# Patient Record
Sex: FEMALE | Race: WHITE | Employment: OTHER | URBAN - METROPOLITAN AREA
[De-identification: names, ages, dates, MRNs, and addresses within clinical notes are randomized per-mention and may not be internally consistent; named-entity substitution may affect disease eponyms.]

---

## 2017-03-28 ENCOUNTER — HOSPITAL ENCOUNTER (OUTPATIENT)
Dept: LAB | Age: 77
Discharge: HOME | End: 2017-03-28
Payer: MEDICARE

## 2017-03-28 ENCOUNTER — OFFICE VISIT (OUTPATIENT)
Dept: FAMILY MEDICINE CLINIC | Age: 77
End: 2017-03-28

## 2017-03-28 VITALS
SYSTOLIC BLOOD PRESSURE: 123 MMHG | HEART RATE: 70 BPM | WEIGHT: 159 LBS | TEMPERATURE: 97 F | DIASTOLIC BLOOD PRESSURE: 52 MMHG | OXYGEN SATURATION: 98 % | HEIGHT: 62 IN | BODY MASS INDEX: 29.26 KG/M2 | RESPIRATION RATE: 16 BRPM

## 2017-03-28 DIAGNOSIS — M15.9 PRIMARY OSTEOARTHRITIS INVOLVING MULTIPLE JOINTS: ICD-10-CM

## 2017-03-28 DIAGNOSIS — E55.9 VITAMIN D DEFICIENCY: ICD-10-CM

## 2017-03-28 DIAGNOSIS — K21.9 GASTROESOPHAGEAL REFLUX DISEASE WITHOUT ESOPHAGITIS: ICD-10-CM

## 2017-03-28 DIAGNOSIS — Z51.81 ENCOUNTER FOR MEDICATION MONITORING: ICD-10-CM

## 2017-03-28 DIAGNOSIS — F41.9 ANXIETY: ICD-10-CM

## 2017-03-28 DIAGNOSIS — Z00.00 MEDICARE ANNUAL WELLNESS VISIT, SUBSEQUENT: Primary | ICD-10-CM

## 2017-03-28 DIAGNOSIS — R73.02 IGT (IMPAIRED GLUCOSE TOLERANCE): ICD-10-CM

## 2017-03-28 DIAGNOSIS — E78.1 HYPERTRIGLYCERIDEMIA: ICD-10-CM

## 2017-03-28 LAB
GLUCOSE POC: 91 MG/DL
HBA1C MFR BLD HPLC: 5.7 %

## 2017-03-28 PROCEDURE — 80053 COMPREHEN METABOLIC PANEL: CPT

## 2017-03-28 PROCEDURE — 82306 VITAMIN D 25 HYDROXY: CPT

## 2017-03-28 PROCEDURE — 80061 LIPID PANEL: CPT

## 2017-03-28 PROCEDURE — 85027 COMPLETE CBC AUTOMATED: CPT

## 2017-03-28 PROCEDURE — 36415 COLL VENOUS BLD VENIPUNCTURE: CPT

## 2017-03-28 RX ORDER — AMOXICILLIN 500 MG/1
CAPSULE ORAL
COMMUNITY
End: 2017-09-25

## 2017-03-28 RX ORDER — LORAZEPAM 1 MG/1
.5-1 TABLET ORAL
Qty: 90 TAB | Refills: 1 | Status: SHIPPED | OUTPATIENT
Start: 2017-03-28 | End: 2017-09-25 | Stop reason: SDUPTHER

## 2017-03-28 RX ORDER — AMOXICILLIN 500 MG/1
CAPSULE ORAL
COMMUNITY
Start: 2017-01-25 | End: 2017-03-28 | Stop reason: ALTCHOICE

## 2017-03-28 NOTE — MR AVS SNAPSHOT
Visit Information Date & Time Provider Department Dept. Phone Encounter #  
 3/28/2017  8:00 AM Meir Alfonso MD [facility] [phone] [id number] Follow-up Instructions Return in about 6 months (around 9/28/2017). Upcoming Health Maintenance Date Due  
 MEDICARE YEARLY EXAM 10/1/2014 GLAUCOMA SCREENING Q2Y 10/10/2018 COLONOSCOPY 10/17/2021 DTaP/Tdap/Td series (2 - Td) 3/21/2022 Allergies as of 3/28/2017  Review Complete On: 3/28/2017 By: Meir Alfonso MD  
  
 Severity Noted Reaction Type Reactions Macrobid [Nitrofurantoin Monohyd/m-cryst]  08/24/2012    Rash Pcn [Penicillins]  02/23/2010    Hives Current Immunizations  Reviewed on 9/28/2016 Name Date Hib 9/17/2013 Influenza High Dose Vaccine PF 9/2/2016, 8/26/2015 Influenza Vaccine 9/15/2014 Influenza Vaccine Split 8/29/2012, 9/21/2011 Pneumococcal Conjugate (PCV-13) 8/19/2015 Pneumococcal Vaccine (Pcv) 11/21/2007 TDAP Vaccine 3/21/2012 Varicella Virus Vaccine Live 12/6/2012 Not reviewed this visit You Were Diagnosed With   
  
 Codes Comments Hypertriglyceridemia    -  Primary ICD-10-CM: E78.1 ICD-9-CM: 272.1 IGT (impaired glucose tolerance)     ICD-10-CM: R73.02 
ICD-9-CM: 790.22 Primary osteoarthritis involving multiple joints     ICD-10-CM: M15.0 ICD-9-CM: 715.09 Vitamin D deficiency     ICD-10-CM: E55.9 ICD-9-CM: 268.9 Gastroesophageal reflux disease without esophagitis     ICD-10-CM: K21.9 ICD-9-CM: 530.81 Encounter for medication monitoring     ICD-10-CM: Z51.81 
ICD-9-CM: V58.83 Anxiety     ICD-10-CM: F41.9 ICD-9-CM: 300.00 Vitals BP Pulse Temp Resp Height(growth percentile) Weight(growth percentile) 123/52 (BP 1 Location: Left arm, BP Patient Position: Sitting) 70 97 °F (36.1 °C) (Oral) 16 5' 1.75\" (1.568 m) 159 lb (72.1 kg) SpO2 BMI OB Status Smoking Status 98% 29.32 kg/m2 Postmenopausal Current Every Day Smoker Vitals History BMI and BSA Data Body Mass Index Body Surface Area  
 29.32 kg/m 2 1.77 m 2 Preferred Pharmacy Pharmacy Name Phone [facility], [address] [facility] [phone] Your Updated Medication List  
  
   
This list is accurate as of: 3/28/17  9:04 AM.  Always use your most recent med list.  
  
  
  
  
 amoxicillin 500 mg capsule Commonly known as:  AMOXIL Take 4 caps 1 hour prior to dental procedures  
  
 aspirin 81 mg chewable tablet Take 81 mg by mouth daily. cetirizine 10 mg tablet Commonly known as:  ZYRTEC Take 10 mg by mouth as needed for Allergies. esomeprazole 40 mg capsule Commonly known as:  NexIUM Take 1 Cap by mouth daily. gemfibrozil 600 mg tablet Commonly known as:  LOPID Take 1 Tab by mouth two (2) times a day. ibuprofen 200 mg tablet Commonly known as:  MOTRIN Take 200 mg by mouth every six (6) hours as needed for Pain. LORazepam 1 mg tablet Commonly known as:  ATIVAN Take 0.5-1 Tabs by mouth every eight (8) hours as needed for Anxiety. promethazine-codeine 6.25-10 mg/5 mL syrup Commonly known as:  PHENERGAN with CODEINE Take 5 mL by mouth every six (6) hours as needed for Cough. Max Daily Amount: 20 mL. VITAMIN D3 1,000 unit Cap Generic drug:  cholecalciferol Take 1 Cap by mouth daily. Prescriptions Printed Refills LORazepam (ATIVAN) 1 mg tablet 1 Sig: Take 0.5-1 Tabs by mouth every eight (8) hours as needed for Anxiety. Class: Print Route: Oral  
  
We Performed the Following AMB POC GLUCOSE, QUANTITATIVE, BLOOD [26723 CPT(R)] AMB POC HEMOGLOBIN A1C [57079 CPT(R)] CBC W/O DIFF [98130 CPT(R)] LIPID PANEL [92616 CPT(R)] METABOLIC PANEL, COMPREHENSIVE [39415 CPT(R)] Follow-up Instructions Return in about 6 months (around 9/28/2017). To-Do List   
 05/01/2017 3:30 PM  
  Appointment with [address] at [facility] ([phone]) NON-CONTRAST STUDY: 1. Bring any non [facility] facility films/images pertaining to the area of interest with you on the day of appointment. 2. Check in at registration at least 30 minutes before appt time unless you were instructed to do otherwise. 3. If you have to drink oral contrast please pick it up any weekday prior to your appointment, if you cannot please check in 2 hrs  before appt time. Introducing [facility]! Edita Deleon introduces [employer] patient portal. Now you can access parts of your medical record, email your doctor's office, and request medication refills online. 1. In your internet browser, go to [email or web address] 2. Click on the First Time User? Click Here link in the Sign In box. You will see the New Member Sign Up page. 3. Enter your [employer] Access Code exactly as it appears below. You will not need to use this code after youve completed the sign-up process. If you do not sign up before the expiration date, you must request a new code. · [employer] Access Code: [email or web address] Expires: 4/5/2017  3:04 PM 
 
4. Enter the last four digits of your Social Security Number (xxxx) and Date of Birth (mm/dd/yyyy) as indicated and click Submit. You will be taken to the next sign-up page. 5. Create a Command Information[employer] ID. This will be your [employer] login ID and cannot be changed, so think of one that is secure and easy to remember. 6. Create a [employer] password. You can change your password at any time. 7. Enter your Password Reset Question and Answer. This can be used at a later time if you forget your password. 8. Enter your e-mail address. You will receive e-mail notification when new information is available in [address]. 9. Click Sign Up. You can now view and download portions of your medical record. 10. Click the Download Summary menu link to download a portable copy of your medical information. If you have questions, please visit the Frequently Asked Questions section of the [email or web address] website. Remember, [email or web address] is NOT to be used for urgent needs. For medical emergencies, dial 911. Now available from your iPhone and Android! Please provide this summary of care documentation to your next provider. Your primary care clinician is listed as Jailyn Fagan. If you have any questions after today's visit, please call [phone].

## 2017-03-28 NOTE — PROGRESS NOTES
Chief Complaint   Patient presents with    Annual Wellness Visit     Medicare         Lipid: 9/28/2016    A1C 9/28/2016    CMP 9/28/2016    Pt states last mammogram was 9/2016 at [facility]. Pt states she had a colonoscopy 10/2016 by Dr. Lupis Sagastume and needs to repeat in 5 years.

## 2017-03-28 NOTE — PROGRESS NOTES
HISTORY OF PRESENT ILLNESS  Sabino Garcia is a 77 y.o. female. HPI   Follow up on chronic medical problems. Doing well. Hypertriglyceridemia follow up:  Compliant w/ meds, low fat, low cholesterol diet. Exercising some. No muscle nor abdominal pain, no skin discoloration. Labs done per endo. Osteoarthritis:  Patient has osteoarthritis. Has various joint aches but overall doing well. Symptoms onset: problem is longstanding. Rheumatological ROS: stable, mild-to-moderate joint symptoms intermittently, reasonably well controlled by PRN meds. Response to treatment plan: stable and intermittent. Glucose intolerance reveiw:  She has IGT. Labs done requested by endo to be done. Diabetic ROS - further diabetic ROS: no polyuria or polydipsia, no chest pain, dyspnea or TIA's, no numbness, tingling or pain in extremities, no unusual visual symptoms. Lab review: orders written for new lab studies as appropriate; see orders. {Choose one or more HPI Chronic Disease Notes, press DELETE if none desired:71070}  {Choose one or more SmartLinks; press DELETE if none desired:[id number]}   {Choose one or more Last Lab values; press DELETE if none desired:[id number]}     Review of Systems   Constitutional: Negative for malaise/fatigue. HENT: Negative for congestion. Eyes: Negative for blurred vision. Respiratory: Negative for cough and shortness of breath. Cardiovascular: Negative for chest pain, palpitations and leg swelling. Gastrointestinal: Negative for abdominal pain, constipation and heartburn. Genitourinary: Negative for dysuria, frequency and urgency. Musculoskeletal: Negative for back pain and joint pain. Neurological: Negative for dizziness, tingling and headaches. Endo/Heme/Allergies: Negative for environmental allergies. Psychiatric/Behavioral: Negative for depression. The patient does not have insomnia.         Physical Exam   Constitutional: She appears well-developed and well-nourished. HENT:   Right Ear: Tympanic membrane and ear canal normal.   Left Ear: Tympanic membrane and ear canal normal.   Nose: No mucosal edema or rhinorrhea. Mouth/Throat: Oropharynx is clear and moist and mucous membranes are normal.   Neck: Normal range of motion. Neck supple. No thyromegaly present. Cardiovascular: Normal rate and regular rhythm. No murmur heard. Pulmonary/Chest: Effort normal and breath sounds normal.   Abdominal: Soft. Bowel sounds are normal. There is no tenderness. Musculoskeletal: Normal range of motion. She exhibits no edema. Lymphadenopathy:     She has no cervical adenopathy. Skin: Skin is warm and dry. Psychiatric: She has a normal mood and affect. Nursing note and vitals reviewed.       ASSESSMENT and PLAN  {ASSESSMENT/PLAN:54408}

## 2017-03-28 NOTE — PROGRESS NOTES
This is a Subsequent Medicare Annual Wellness Visit providing Personalized Prevention Plan Services (PPPS) (Performed 12 months after initial AWV and PPPS )    I have reviewed the patient's medical history in detail and updated the computerized patient record. Doing well. Hypertriglyceridemia follow up:  Compliant w/ meds, low fat, low cholesterol diet. Exercising some. No muscle nor abdominal pain, no skin discoloration. Labs done per endo. Osteoarthritis:  Patient has osteoarthritis. Has various joint aches but overall doing well. Symptoms onset: problem is longstanding. Rheumatological ROS: stable, mild-to-moderate joint symptoms intermittently, reasonably well controlled by PRN meds. Response to treatment plan: stable and intermittent. Glucose intolerance reveiw:  She has IGT. Labs done requested by endo to be done. Diabetic ROS - further diabetic ROS: no polyuria or polydipsia, no chest pain, dyspnea or TIA's, no numbness, tingling or pain in extremities, no unusual visual symptoms. Lab review: orders written for new lab studies as appropriate; see orders.    History     Past Medical History:   Diagnosis Date    GERD (gastroesophageal reflux disease) 2/23/2010    Hypertriglyceridemia 2/23/2010    Hypovitaminosis D     OA (osteoarthritis) 2/23/2010    Osteoarthritis     Vitamin D deficiency 2/23/2010      Past Surgical History:   Procedure Laterality Date    HX HEENT  12/08    cervical neck stenosis s/p cervical release    HX HIP REPLACEMENT  6/2007    right    HX HIP REPLACEMENT  12/2007    left    HX ORTHOPAEDIC  12/2008    cervical release    NH COLONOSCOPY FLX DX W/COLLJ SPEC WHEN PFRMD  06/2006    NH COLONOSCOPY FLX DX W/COLLJ SPEC WHEN PFRMD  08/29/2011    Dr. Abigail Ignacio     Current Outpatient Prescriptions   Medication Sig Dispense Refill    amoxicillin (AMOXIL) 500 mg capsule Take 4 caps 1 hour prior to dental procedures      LORazepam (ATIVAN) 1 mg tablet Take 0.5-1 Tabs by mouth every eight (8) hours as needed for Anxiety. 90 Tab 1    gemfibrozil (LOPID) 600 mg tablet Take 1 Tab by mouth two (2) times a day. 180 Tab 3    esomeprazole (NEXIUM) 40 mg capsule Take 1 Cap by mouth daily. 90 Cap 3    ibuprofen (MOTRIN) 200 mg tablet Take 200 mg by mouth every six (6) hours as needed for Pain.  aspirin 81 mg chewable tablet Take 81 mg by mouth daily.  cetirizine (ZYRTEC) 10 mg tablet Take 10 mg by mouth as needed for Allergies.  Cholecalciferol, Vitamin D3, (VITAMIN D3) 1,000 unit Cap Take 1 Cap by mouth daily.  promethazine-codeine (PHENERGAN WITH CODEINE) 6.25-10 mg/5 mL syrup Take 5 mL by mouth every six (6) hours as needed for Cough. Max Daily Amount: 20 mL. 240 mL 1     Allergies   Allergen Reactions    Macrobid [Nitrofurantoin Monohyd/M-Cryst] Rash    Pcn [Penicillins] Hives     Family History   Problem Relation Age of Onset    Hypertension Mother     Heart Failure Mother     Cancer Father      colon    Lung Disease Brother     Arthritis-rheumatoid Brother     Heart Disease Brother      Social History   Substance Use Topics    Smoking status: Current Every Day Smoker     Packs/day: 0.25     Years: 55.00    Smokeless tobacco: Never Used    Alcohol use 0.0 oz/week     0 Standard drinks or equivalent per week      Comment: 1 drink a month     Patient Active Problem List   Diagnosis Code    Hypertriglyceridemia E78.1    GERD (gastroesophageal reflux disease) K21.9    OA (osteoarthritis) M19.90    Vitamin D deficiency E55.9    Anxiety F41.9    Environmental allergies Z91.09    Encounter for medication monitoring Z51.81    IGT (impaired glucose tolerance) R73.02       Depression Risk Factor Screening:     PHQ 2 / 9, over the last two weeks 3/28/2017   Little interest or pleasure in doing things Not at all   Feeling down, depressed or hopeless Not at all   Total Score PHQ 2 0     Alcohol Risk Factor Screening:    On any occasion during the past 3 months, have you had more than 3 drinks containing alcohol? No    Do you average more than 7 drinks per week? No      Functional Ability and Level of Safety:     Hearing Loss   Normal     Activities of Daily Living   Self-care. Requires assistance with: no ADLs    Fall Risk     Fall Risk Assessment, last 12 mths 3/28/2017   Able to walk? Yes   Fall in past 12 months? No     Abuse Screen   Patient is not abused    Review of Systems   Constitutional: negative for fatigue and malaise  Eyes: negative for irritation and redness  Ears, nose, mouth, throat, and face: negative for earaches, nasal congestion and hoarseness  Respiratory: negative for cough, sputum or dyspnea on exertion  Cardiovascular: negative for chest pain, chest pressure/discomfort, dyspnea, palpitations, irregular heart beats, fatigue, lower extremity edema  Gastrointestinal: negative for dysphagia, dyspepsia, reflux symptoms, nausea, vomiting, change in bowel habits, melena, constipation and abdominal pain  Genitourinary:negative for frequency, dysuria, nocturia, urinary incontinence. Integument/breast: negative for rash and dryness  Hematologic/lymphatic: negative for easy bruising and lymphadenopathy  Musculoskeletal:negative for myalgias, arthralgias, stiff joints, neck pain, back pain and muscle weakness  Neurological: negative for headaches, dizziness, tremor and weakness  Endocrine: negative for diabetic symptoms including polyuria and polydipsia      Physical Examination     Evaluation of Cognitive Function:  Mood/affect:  happy  Appearance: age appropriate  Family member/caregiver input: NA    Physical Exam   Constitutional: She appears well-developed and well-nourished.      Visit Vitals    /52 (BP 1 Location: Left arm, BP Patient Position: Sitting)    Pulse 70    Temp 97 °F (36.1 °C) (Oral)    Resp 16    Ht 5' 1.75\" (1.568 m)    Wt 159 lb (72.1 kg)    SpO2 98%    BMI 29.32 kg/m2          HENT:   Right Ear: Tympanic membrane and ear canal normal.   Left Ear: Tympanic membrane and ear canal normal.   Nose: No mucosal edema or rhinorrhea. Mouth/Throat: Oropharynx is clear and moist and mucous membranes are normal.   Neck: Normal range of motion. Neck supple. No thyromegaly present. Cardiovascular: Normal rate and regular rhythm. No murmur heard. Pulmonary/Chest: Effort normal and breath sounds normal.   Abdominal: Soft. Bowel sounds are normal. There is no tenderness. Musculoskeletal: Normal range of motion. She exhibits no edema. Lymphadenopathy:     She has no cervical adenopathy. Skin: Skin is warm and dry. Psychiatric: She has a normal mood and affect. Nursing note and vitals reviewed. Patient Care Team:  Eboni Gruber MD as PCP - General    Advice/Referrals/Counseling   Education and counseling provided:  Are appropriate based on today's review and evaluation  End-of-Life planning (with patient's consent)      Assessment/Plan   Adriano berg was seen today for annual wellness visit. Diagnoses and all orders for this visit:    Medicare annual wellness visit, subsequent    Hypertriglyceridemia  -     LIPID PANEL    IGT (impaired glucose tolerance)  -     AMB POC HEMOGLOBIN A1C  -     AMB POC GLUCOSE, QUANTITATIVE, BLOOD    Primary osteoarthritis involving multiple joints  Stable     Vitamin D deficiency  -     VITAMIN D, 25 HYDROXY    Gastroesophageal reflux disease without esophagitis  Stable on Nexium prn. Anxiety  -    Refill LORazepam (ATIVAN) 1 mg tablet; Take 0.5-1 Tabs by mouth every eight (8) hours as needed for Anxiety. Encounter for medication monitoring  -     METABOLIC PANEL, COMPREHENSIVE  -     CBC W/O DIFF      Follow-up Disposition:  Return in about 6 months (around 9/28/2017). reviewed diet, exercise and weight control  cardiovascular risk and specific lipid/LDL goals reviewed  reviewed medications and side effects in detail.       I have discussed diagnosis listed in this note with pt and/or family. I have discussed treatment plans and options and the risk/benefit analysis of those options, including safe use of medications and possible medication side effects. Through the use of shared decision making we have agreed to the above plan. The patient has received an after-visit summary and questions were answered concerning future plans and follow up. Advise pt of any urgent changes then to proceed to the ER.

## 2017-03-29 LAB
25(OH)D3+25(OH)D2 SERPL-MCNC: 38.5 NG/ML (ref 30–100)
ALBUMIN SERPL-MCNC: 4.5 G/DL (ref 3.5–4.8)
ALBUMIN/GLOB SERPL: 2 {RATIO} (ref 1.2–2.2)
ALP SERPL-CCNC: 124 IU/L (ref 39–117)
ALT SERPL-CCNC: 12 IU/L (ref 0–32)
AST SERPL-CCNC: 17 IU/L (ref 0–40)
BILIRUB SERPL-MCNC: 0.3 MG/DL (ref 0–1.2)
BUN SERPL-MCNC: 16 MG/DL (ref 8–27)
BUN/CREAT SERPL: 17 (ref 11–26)
CALCIUM SERPL-MCNC: 10 MG/DL (ref 8.7–10.3)
CHLORIDE SERPL-SCNC: 104 MMOL/L (ref 96–106)
CHOLEST SERPL-MCNC: 220 MG/DL (ref 100–199)
CO2 SERPL-SCNC: 23 MMOL/L (ref 18–29)
CREAT SERPL-MCNC: 0.93 MG/DL (ref 0.57–1)
ERYTHROCYTE [DISTWIDTH] IN BLOOD BY AUTOMATED COUNT: 13.9 % (ref 12.3–15.4)
GLOBULIN SER CALC-MCNC: 2.3 G/DL (ref 1.5–4.5)
GLUCOSE SERPL-MCNC: 92 MG/DL (ref 65–99)
HCT VFR BLD AUTO: 39.3 % (ref 34–46.6)
HDLC SERPL-MCNC: 53 MG/DL
HGB BLD-MCNC: 12.8 G/DL (ref 11.1–15.9)
INTERPRETATION, 910389: NORMAL
LDLC SERPL CALC-MCNC: 140 MG/DL (ref 0–99)
MCH RBC QN AUTO: 29.3 PG (ref 26.6–33)
MCHC RBC AUTO-ENTMCNC: 32.6 G/DL (ref 31.5–35.7)
MCV RBC AUTO: 90 FL (ref 79–97)
PLATELET # BLD AUTO: 350 X10E3/UL (ref 150–379)
POTASSIUM SERPL-SCNC: 4.6 MMOL/L (ref 3.5–5.2)
PROT SERPL-MCNC: 6.8 G/DL (ref 6–8.5)
RBC # BLD AUTO: 4.37 X10E6/UL (ref 3.77–5.28)
SODIUM SERPL-SCNC: 144 MMOL/L (ref 134–144)
TRIGL SERPL-MCNC: 134 MG/DL (ref 0–149)
VLDLC SERPL CALC-MCNC: 27 MG/DL (ref 5–40)
WBC # BLD AUTO: 7.4 X10E3/UL (ref 3.4–10.8)

## 2017-05-01 ENCOUNTER — HOSPITAL ENCOUNTER (OUTPATIENT)
Dept: CT IMAGING | Age: 77
Discharge: HOME | End: 2017-05-01
Attending: FAMILY MEDICINE
Payer: SELF-PAY

## 2017-05-01 DIAGNOSIS — Z12.2 ENCOUNTER FOR SCREENING FOR LUNG CANCER: ICD-10-CM

## 2017-05-01 DIAGNOSIS — Z87.891 PERSONAL HISTORY OF NICOTINE DEPENDENCE: ICD-10-CM

## 2017-05-01 DIAGNOSIS — Z72.0 TOBACCO USE: ICD-10-CM

## 2017-05-01 PROCEDURE — G0297 LDCT FOR LUNG CA SCREEN: HCPCS

## 2017-09-12 DIAGNOSIS — R92.8 ABNORMAL MAMMOGRAM: Primary | ICD-10-CM

## 2017-09-12 DIAGNOSIS — N63.20 LEFT BREAST MASS: ICD-10-CM

## 2017-09-25 ENCOUNTER — HOSPITAL ENCOUNTER (OUTPATIENT)
Dept: LAB | Age: 77
Discharge: HOME | End: 2017-09-25
Payer: MEDICARE

## 2017-09-25 ENCOUNTER — OFFICE VISIT (OUTPATIENT)
Dept: FAMILY MEDICINE CLINIC | Age: 77
End: 2017-09-25

## 2017-09-25 VITALS
DIASTOLIC BLOOD PRESSURE: 70 MMHG | BODY MASS INDEX: 29.7 KG/M2 | RESPIRATION RATE: 18 BRPM | SYSTOLIC BLOOD PRESSURE: 120 MMHG | OXYGEN SATURATION: 98 % | HEIGHT: 62 IN | HEART RATE: 88 BPM | WEIGHT: 161.4 LBS | TEMPERATURE: 97.9 F

## 2017-09-25 DIAGNOSIS — M15.9 PRIMARY OSTEOARTHRITIS INVOLVING MULTIPLE JOINTS: ICD-10-CM

## 2017-09-25 DIAGNOSIS — E78.1 HYPERTRIGLYCERIDEMIA: Primary | ICD-10-CM

## 2017-09-25 DIAGNOSIS — F41.9 ANXIETY: ICD-10-CM

## 2017-09-25 DIAGNOSIS — K21.9 GASTROESOPHAGEAL REFLUX DISEASE WITHOUT ESOPHAGITIS: ICD-10-CM

## 2017-09-25 DIAGNOSIS — R73.02 IGT (IMPAIRED GLUCOSE TOLERANCE): ICD-10-CM

## 2017-09-25 DIAGNOSIS — Z91.09 ENVIRONMENTAL ALLERGIES: ICD-10-CM

## 2017-09-25 DIAGNOSIS — Z51.81 ENCOUNTER FOR MEDICATION MONITORING: ICD-10-CM

## 2017-09-25 LAB
GLUCOSE POC: 91 MG/DL
HBA1C MFR BLD HPLC: 6 %

## 2017-09-25 PROCEDURE — 80053 COMPREHEN METABOLIC PANEL: CPT

## 2017-09-25 PROCEDURE — 36415 COLL VENOUS BLD VENIPUNCTURE: CPT

## 2017-09-25 PROCEDURE — 80061 LIPID PANEL: CPT

## 2017-09-25 RX ORDER — LORAZEPAM 1 MG/1
.5-1 TABLET ORAL
Qty: 90 TAB | Refills: 1 | Status: SHIPPED | OUTPATIENT
Start: 2017-09-25 | End: 2018-04-02 | Stop reason: SDUPTHER

## 2017-09-25 RX ORDER — PROMETHAZINE HYDROCHLORIDE AND CODEINE PHOSPHATE 6.25; 1 MG/5ML; MG/5ML
1 SOLUTION ORAL
Qty: 240 ML | Refills: 1 | Status: SHIPPED | OUTPATIENT
Start: 2017-09-25 | End: 2019-10-31

## 2017-09-25 RX ORDER — GEMFIBROZIL 600 MG/1
600 TABLET, FILM COATED ORAL 2 TIMES DAILY
Qty: 180 TAB | Refills: 3 | Status: SHIPPED | OUTPATIENT
Start: 2017-09-25 | End: 2018-10-02 | Stop reason: SDUPTHER

## 2017-09-25 RX ORDER — PANTOPRAZOLE SODIUM 40 MG/1
40 TABLET, DELAYED RELEASE ORAL DAILY
Qty: 90 TAB | Refills: 3 | Status: SHIPPED | OUTPATIENT
Start: 2017-09-25 | End: 2018-10-02 | Stop reason: SDUPTHER

## 2017-09-25 NOTE — PROGRESS NOTES
HISTORY OF PRESENT ILLNESS  Steven Bonner is a 69 y.o. female. HPI   Follow up on chronic medical problems. Doing well. Hypertriglyceridemia follow up:  Compliant w/ meds, low fat, low cholesterol diet. Exercising some. No muscle nor abdominal pain, no skin discoloration. Labs done per endo. Osteoarthritis:  Patient has osteoarthritis. Has various joint aches but overall doing well. Symptoms onset: problem is longstanding. Rheumatological ROS: stable, mild-to-moderate joint symptoms intermittently, reasonably well controlled by PRN meds. Response to treatment plan: stable and intermittent. Glucose intolerance reveiw:  She has IGT. Diabetic ROS - further diabetic ROS: no polyuria or polydipsia, no chest pain, dyspnea or TIA's, no numbness, tingling or pain in extremities, no unusual visual symptoms. Lab review: orders written for new lab studies as appropriate; see orders. Patient Active Problem List   Diagnosis Code    Hypertriglyceridemia E78.1    GERD (gastroesophageal reflux disease) K21.9    OA (osteoarthritis) M19.90    Vitamin D deficiency E55.9    Anxiety F41.9    Environmental allergies Z91.09    Encounter for medication monitoring Z51.81    IGT (impaired glucose tolerance) R73.02       Current Outpatient Prescriptions   Medication Sig Dispense Refill    LORazepam (ATIVAN) 1 mg tablet Take 0.5-1 Tabs by mouth every eight (8) hours as needed for Anxiety. 90 Tab 1    gemfibrozil (LOPID) 600 mg tablet Take 1 Tab by mouth two (2) times a day. 180 Tab 3    esomeprazole (NEXIUM) 40 mg capsule Take 1 Cap by mouth daily. 90 Cap 3    ibuprofen (MOTRIN) 200 mg tablet Take 200 mg by mouth every six (6) hours as needed for Pain.  aspirin 81 mg chewable tablet Take 81 mg by mouth daily.  cetirizine (ZYRTEC) 10 mg tablet Take 10 mg by mouth as needed for Allergies.  Cholecalciferol, Vitamin D3, (VITAMIN D3) 1,000 unit Cap Take 1 Cap by mouth daily. Allergies   Allergen Reactions    Macrobid [Nitrofurantoin Monohyd/M-Cryst] Rash    Pcn [Penicillins] Hives         Past Medical History:   Diagnosis Date    GERD (gastroesophageal reflux disease) 2/23/2010    Hypertriglyceridemia 2/23/2010    Hypovitaminosis D     OA (osteoarthritis) 2/23/2010    Osteoarthritis     Vitamin D deficiency 2/23/2010         Past Surgical History:   Procedure Laterality Date    HX HEENT  12/08    cervical neck stenosis s/p cervical release    HX HIP REPLACEMENT  6/2007    right    HX HIP REPLACEMENT  12/2007    left    HX ORTHOPAEDIC  12/2008    cervical release    [place] COLONOSCOPY FLX DX W/COLLJ SPEC WHEN PFRMD  06/2006    [place] COLONOSCOPY FLX DX W/COLLJ SPEC WHEN PFRMD  08/29/2011    Dr. Mihir Bolanos         Family History   Problem Relation Age of Onset    Hypertension Mother     Heart Failure Mother     Cancer Father      colon    Lung Disease Brother     Arthritis-rheumatoid Brother     Heart Disease Brother        Social History   Substance Use Topics    Smoking status: Current Every Day Smoker     Packs/day: 0.25     Years: 55.00    Smokeless tobacco: Never Used    Alcohol use 0.0 oz/week     0 Standard drinks or equivalent per week      Comment: 1 drink a month       Lab Results   Component Value Date/Time    WBC 7.4 03/28/2017 09:18 AM    HGB 12.8 03/28/2017 09:18 AM    HCT 39.3 03/28/2017 09:18 AM    PLATELET 912 98/52/4124 09:18 AM    MCV 90 03/28/2017 09:18 AM       Lab Results   Component Value Date/Time    Cholesterol, total 220 03/28/2017 09:18 AM    HDL Cholesterol 53 03/28/2017 09:18 AM    LDL, calculated 140 03/28/2017 09:18 AM    Triglyceride 134 03/28/2017 09:18 AM    CHOL/HDL Ratio 3.3 09/22/2010 10:02 AM       Lab Results   Component Value Date/Time    Sodium 144 03/28/2017 09:18 AM    Potassium 4.6 03/28/2017 09:18 AM    Chloride 104 03/28/2017 09:18 AM    CO2 23 03/28/2017 09:18 AM    Anion gap 10 09/23/2009 01:22 PM    Glucose 92 03/28/2017 09:18 AM    BUN 16 03/28/2017 09:18 AM    Creatinine 0.93 03/28/2017 09:18 AM    BUN/Creatinine ratio 17 03/28/2017 09:18 AM    GFR est AA 70 03/28/2017 09:18 AM    GFR est non-AA 60 03/28/2017 09:18 AM    Calcium 10.0 03/28/2017 09:18 AM    Bilirubin, total 0.3 03/28/2017 09:18 AM    ALT (SGPT) 12 03/28/2017 09:18 AM    AST (SGOT) 17 03/28/2017 09:18 AM    Alk. phosphatase 124 03/28/2017 09:18 AM    Protein, total 6.8 03/28/2017 09:18 AM    Albumin 4.5 03/28/2017 09:18 AM    Globulin 2.8 09/23/2009 01:22 PM    A-G Ratio 2.0 03/28/2017 09:18 AM      Lab Results   Component Value Date/Time    Hemoglobin A1c 6.2 03/27/2013 08:49 AM    Hemoglobin A1c (POC) 5.7 03/28/2017 09:18 AM    Hemoglobin A1c, External 6.2 06/26/2015         Review of Systems   Constitutional: Negative for malaise/fatigue. HENT: Negative for congestion. Eyes: Negative for blurred vision. Respiratory: Negative for cough and shortness of breath. Cardiovascular: Negative for chest pain, palpitations and leg swelling. Gastrointestinal: Negative for abdominal pain, constipation and heartburn. Genitourinary: Negative for dysuria, frequency and urgency. Musculoskeletal: Negative for back pain and joint pain. Neurological: Negative for dizziness, tingling and headaches. Endo/Heme/Allergies: Negative for environmental allergies. Psychiatric/Behavioral: Negative for depression. The patient does not have insomnia. Physical Exam   Constitutional: She appears well-developed and well-nourished. /70 (BP 1 Location: Left arm, BP Patient Position: Sitting)  Pulse 88  Temp 97.9 °F (36.6 °C) (Oral)   Resp 18  Ht 5' 1.75\" (1.568 m)  Wt 161 lb 6.4 oz (73.2 kg)  SpO2 98%  BMI 29.76 kg/m2     HENT:   Right Ear: Tympanic membrane and ear canal normal.   Left Ear: Tympanic membrane and ear canal normal.   Nose: No mucosal edema or rhinorrhea.    Mouth/Throat: Oropharynx is clear and moist and mucous membranes are normal.   Neck: Normal range of motion. Neck supple. No thyromegaly present. Cardiovascular: Normal rate and regular rhythm. No murmur heard. Pulmonary/Chest: Effort normal and breath sounds normal.   Abdominal: Soft. Bowel sounds are normal. There is no tenderness. Musculoskeletal: Normal range of motion. She exhibits no edema. Lymphadenopathy:     She has no cervical adenopathy. Skin: Skin is warm and dry. Psychiatric: She has a normal mood and affect. Nursing note and vitals reviewed. ASSESSMENT and PLAN  Diagnoses and all orders for this visit:    1. Hypertriglyceridemia  -     LIPID PANEL  -     Refill gemfibrozil (LOPID) 600 mg tablet; Take 1 Tab by mouth two (2) times a day. 2. IGT (impaired glucose tolerance)  -     AMB POC HEMOGLOBIN A1C  -     AMB POC GLUCOSE, QUANTITATIVE, BLOOD    3. Primary osteoarthritis involving multiple joints  Stable     4. Anxiety  -     Refill LORazepam (ATIVAN) 1 mg tablet; Take 0.5-1 Tabs by mouth every eight (8) hours as needed for Anxiety. 5. Gastroesophageal reflux disease without esophagitis  -     pantoprazole (PROTONIX) 40 mg tablet; Take 1 Tab by mouth daily.(changed d/t incureance    6. Environmental allergies  -    Refill promethazine-codeine (PHENERGAN WITH CODEINE) 6.25-10 mg/5 mL syrup; Take 5 mL by mouth every six (6) hours as needed for Cough. Max Daily Amount: 20 mL. 7. Encounter for medication monitoring  -     METABOLIC PANEL, BASIC      Follow-up Disposition:  Return in about 6 months (around 3/25/2018).   reviewed diet, exercise and weight control  cardiovascular risk and specific lipid/LDL goals reviewed  reviewed medications and side effects in detail  specific diabetic recommendations: low cholesterol diet, weight control and daily exercise discussed and glycohemoglobin and other lab monitoring discussed

## 2017-09-25 NOTE — MR AVS SNAPSHOT
Visit Information Date & Time Provider Department Dept. Phone Encounter #  
 9/25/2017  7:30 AM Magdiel Handy MD [facility] [phone] [id number] Follow-up Instructions Return in about 6 months (around 3/25/2018). Upcoming Health Maintenance Date Due  
 MEDICARE YEARLY EXAM 3/29/2018 GLAUCOMA SCREENING Q2Y 10/10/2018 COLONOSCOPY 12/14/2021 DTaP/Tdap/Td series (2 - Td) 3/21/2022 Allergies as of 9/25/2017  Review Complete On: 9/25/2017 By: Magdiel Handy MD  
  
 Severity Noted Reaction Type Reactions Macrobid [Nitrofurantoin Monohyd/m-cryst]  08/24/2012    Rash Pcn [Penicillins]  02/23/2010    Hives Current Immunizations  Reviewed on 9/25/2017 Name Date Hib 9/17/2013 Influenza High Dose Vaccine PF 9/11/2017, 9/2/2016, 8/26/2015 Influenza Vaccine 9/15/2014 Influenza Vaccine Split 8/29/2012, 9/21/2011 Pneumococcal Conjugate (PCV-13) 8/19/2015 Pneumococcal Vaccine (Pcv) 11/21/2007 TDAP Vaccine 3/21/2012 Varicella Virus Vaccine Live 12/6/2012 Reviewed by Magdiel Handy MD on 9/25/2017 at  7:55 AM  
You Were Diagnosed With   
  
 Codes Comments IGT (impaired glucose tolerance)    -  Primary ICD-10-CM: R73.02 
ICD-9-CM: 790.22 Vitamin D deficiency     ICD-10-CM: E55.9 ICD-9-CM: 268.9 Primary osteoarthritis involving multiple joints     ICD-10-CM: M15.0 ICD-9-CM: 715.09 Hypertriglyceridemia     ICD-10-CM: E78.1 ICD-9-CM: 272.1 Encounter for medication monitoring     ICD-10-CM: Z51.81 
ICD-9-CM: V58.83 Anxiety     ICD-10-CM: F41.9 ICD-9-CM: 300.00 Vitals BP Pulse Temp Resp Height(growth percentile) Weight(growth percentile) 120/70 (BP 1 Location: Left arm, BP Patient Position: Sitting) 88 97.9 °F (36.6 °C) (Oral) 18 5' 1.75\" (1.568 m) 161 lb 6.4 oz (73.2 kg) SpO2 BMI OB Status Smoking Status 98% 29.76 kg/m2 Postmenopausal Current Every Day Smoker BMI and BSA Data Body Mass Index Body Surface Area  
 29.76 kg/m 2 1.79 m 2 Preferred Pharmacy Pharmacy Name Phone [facility]ICK[facility] Herrera, [address] [phone] Your Updated Medication List  
  
   
This list is accurate as of: 9/25/17  8:24 AM.  Always use your most recent med list.  
  
  
  
  
 aspirin 81 mg chewable tablet Take 81 mg by mouth daily. cetirizine 10 mg tablet Commonly known as:  ZYRTEC Take 10 mg by mouth as needed for Allergies. esomeprazole 40 mg capsule Commonly known as:  NexIUM Take 1 Cap by mouth daily. gemfibrozil 600 mg tablet Commonly known as:  LOPID Take 1 Tab by mouth two (2) times a day. ibuprofen 200 mg tablet Commonly known as:  MOTRIN Take 200 mg by mouth every six (6) hours as needed for Pain. LORazepam 1 mg tablet Commonly known as:  ATIVAN Take 0.5-1 Tabs by mouth every eight (8) hours as needed for Anxiety. pantoprazole 40 mg tablet Commonly known as:  PROTONIX Take 1 Tab by mouth daily. promethazine-codeine 6.25-10 mg/5 mL syrup Commonly known as:  PHENERGAN with CODEINE Take 5 mL by mouth every six (6) hours as needed for Cough. Max Daily Amount: 20 mL. VITAMIN D3 1,000 unit Cap Generic drug:  cholecalciferol Take 1 Cap by mouth daily. Prescriptions Printed Refills  
 promethazine-codeine (PHENERGAN WITH CODEINE) 6.25-10 mg/5 mL syrup 1 Sig: Take 5 mL by mouth every six (6) hours as needed for Cough. Max Daily Amount: 20 mL. Class: Print Route: Oral  
 pantoprazole (PROTONIX) 40 mg tablet 3 Sig: Take 1 Tab by mouth daily. Class: Print Route: Oral  
 LORazepam (ATIVAN) 1 mg tablet 1 Sig: Take 0.5-1 Tabs by mouth every eight (8) hours as needed for Anxiety. Class: Print  Route: Oral  
 gemfibrozil (LOPID) 600 mg tablet 3  
 Sig: Take 1 Tab by mouth two (2) times a day. Class: Print Route: Oral  
  
We Performed the Following AMB POC GLUCOSE, QUANTITATIVE, BLOOD [14128 CPT(R)] AMB POC HEMOGLOBIN A1C [77154 CPT(R)] LIPID PANEL [52463 CPT(R)] METABOLIC PANEL, BASIC [80636 CPT(R)] Follow-up Instructions Return in about 6 months (around 3/25/2018). Introducing [facility] SERVICES! [employer] introduces [employer] patient portal. Now you can access parts of your medical record, email your doctor's office, and request medication refills online. 1. In your internet browser, go to [email or web address]. [email or web address] 2. Click on the First Time User? Click Here link in the Sign In box. You will see the New Member Sign Up page. 3. Enter your [employer] Access Code exactly as it appears below. You will not need to use this code after youve completed the sign-up process. If you do not sign up before the expiration date, you must request a new code. · [employer] Access Code: [email or web address] Expires: 12/24/2017  8:24 AM 
 
4. Enter the last four digits of your Social Security Number (xxxx) and Date of Birth (mm/dd/yyyy) as indicated and click Submit. You will be taken to the next sign-up page. 5. Create a [employer] ID. This will be your [employer] login ID and cannot be changed, so think of one that is secure and easy to remember. 6. Create a [employer] password. You can change your password at any time. 7. Enter your Password Reset Question and Answer. This can be used at a later time if you forget your password. 8. Enter your e-mail address. You will receive e-mail notification when new information is available in [address] 9. Click Sign Up. You can now view and download portions of your medical record. 10. Click the Download Summary menu link to download a portable copy of your medical information.  
 
If you have questions, please visit the Frequently Asked Questions section of the [employer]. Remember, [employer] is NOT to be used for urgent needs. For medical emergencies, dial 911. Now available from your iPhone and Android! Please provide this summary of care documentation to your next provider. Your primary care clinician is listed as Mary Ann Cowart. If you have any questions after today's visit, please call [phone].

## 2017-09-26 LAB
ALBUMIN SERPL-MCNC: 4.7 G/DL (ref 3.5–4.8)
ALBUMIN/GLOB SERPL: 2 {RATIO} (ref 1.2–2.2)
ALP SERPL-CCNC: 132 IU/L (ref 39–117)
ALT SERPL-CCNC: 10 IU/L (ref 0–32)
AST SERPL-CCNC: 18 IU/L (ref 0–40)
BILIRUB SERPL-MCNC: 0.4 MG/DL (ref 0–1.2)
BUN SERPL-MCNC: 25 MG/DL (ref 8–27)
BUN/CREAT SERPL: 24 (ref 12–28)
CALCIUM SERPL-MCNC: 10.3 MG/DL (ref 8.7–10.3)
CHLORIDE SERPL-SCNC: 103 MMOL/L (ref 96–106)
CHOLEST SERPL-MCNC: 238 MG/DL (ref 100–199)
CO2 SERPL-SCNC: 20 MMOL/L (ref 18–29)
CREAT SERPL-MCNC: 1.04 MG/DL (ref 0.57–1)
GLOBULIN SER CALC-MCNC: 2.4 G/DL (ref 1.5–4.5)
GLUCOSE SERPL-MCNC: 99 MG/DL (ref 65–99)
HDLC SERPL-MCNC: 51 MG/DL
INTERPRETATION, 910389: NORMAL
INTERPRETATION: NORMAL
LDLC SERPL CALC-MCNC: 153 MG/DL (ref 0–99)
PDF IMAGE, 910387: NORMAL
POTASSIUM SERPL-SCNC: 4.2 MMOL/L (ref 3.5–5.2)
PROT SERPL-MCNC: 7.1 G/DL (ref 6–8.5)
SODIUM SERPL-SCNC: 143 MMOL/L (ref 134–144)
TRIGL SERPL-MCNC: 172 MG/DL (ref 0–149)
VLDLC SERPL CALC-MCNC: 34 MG/DL (ref 5–40)

## 2017-10-10 DIAGNOSIS — R92.8 ABNORMAL MAMMOGRAM: ICD-10-CM

## 2017-10-10 DIAGNOSIS — N63.20 LEFT BREAST MASS: ICD-10-CM

## 2018-04-02 ENCOUNTER — OFFICE VISIT (OUTPATIENT)
Dept: FAMILY MEDICINE CLINIC | Age: 78
End: 2018-04-02

## 2018-04-02 ENCOUNTER — HOSPITAL ENCOUNTER (OUTPATIENT)
Dept: LAB | Age: 78
Discharge: HOME | End: 2018-04-02
Payer: MEDICARE

## 2018-04-02 VITALS
BODY MASS INDEX: 29.68 KG/M2 | HEIGHT: 61 IN | DIASTOLIC BLOOD PRESSURE: 63 MMHG | SYSTOLIC BLOOD PRESSURE: 127 MMHG | WEIGHT: 157.2 LBS | OXYGEN SATURATION: 97 % | HEART RATE: 69 BPM | RESPIRATION RATE: 16 BRPM | TEMPERATURE: 96.7 F

## 2018-04-02 DIAGNOSIS — Z51.81 ENCOUNTER FOR MEDICATION MONITORING: ICD-10-CM

## 2018-04-02 DIAGNOSIS — F41.9 ANXIETY: ICD-10-CM

## 2018-04-02 DIAGNOSIS — Z79.82 LONG-TERM USE OF ASPIRIN THERAPY: ICD-10-CM

## 2018-04-02 DIAGNOSIS — E78.1 HYPERTRIGLYCERIDEMIA: ICD-10-CM

## 2018-04-02 DIAGNOSIS — R73.02 IGT (IMPAIRED GLUCOSE TOLERANCE): ICD-10-CM

## 2018-04-02 DIAGNOSIS — Z12.11 ENCOUNTER FOR SCREENING FECAL OCCULT BLOOD TESTING: ICD-10-CM

## 2018-04-02 DIAGNOSIS — Z00.00 MEDICARE ANNUAL WELLNESS VISIT, SUBSEQUENT: Primary | ICD-10-CM

## 2018-04-02 DIAGNOSIS — M15.9 PRIMARY OSTEOARTHRITIS INVOLVING MULTIPLE JOINTS: ICD-10-CM

## 2018-04-02 LAB
BILIRUB UR QL STRIP: NEGATIVE
GLUCOSE POC: 95 MG/DL
GLUCOSE UR-MCNC: NEGATIVE MG/DL
HBA1C MFR BLD HPLC: 5.8 %
HEMOCCULT STL QL: NEGATIVE
KETONES P FAST UR STRIP-MCNC: NEGATIVE MG/DL
PH UR STRIP: 5 [PH] (ref 4.6–8)
PROT UR QL STRIP: NEGATIVE
SP GR UR STRIP: 1.01 (ref 1–1.03)
UA UROBILINOGEN AMB POC: NORMAL (ref 0.2–1)
URINALYSIS CLARITY POC: CLEAR
URINALYSIS COLOR POC: YELLOW
URINE BLOOD POC: NEGATIVE
URINE LEUKOCYTES POC: NORMAL
URINE NITRITES POC: NEGATIVE
VALID INTERNAL CONTROL?: YES

## 2018-04-02 PROCEDURE — 85027 COMPLETE CBC AUTOMATED: CPT

## 2018-04-02 PROCEDURE — 80061 LIPID PANEL: CPT

## 2018-04-02 PROCEDURE — 80053 COMPREHEN METABOLIC PANEL: CPT

## 2018-04-02 PROCEDURE — 36415 COLL VENOUS BLD VENIPUNCTURE: CPT

## 2018-04-02 RX ORDER — LORAZEPAM 1 MG/1
.5-1 TABLET ORAL
Qty: 90 TAB | Refills: 1 | Status: SHIPPED | OUTPATIENT
Start: 2018-04-02 | End: 2018-10-02 | Stop reason: SDUPTHER

## 2018-04-02 NOTE — PROGRESS NOTES
Chief Complaint   Patient presents with   [facility] Visit     Medicare       Mammogram 9/12/2017    Colonoscopy 12/14/2016 by Dr. Tan Casas- repeat in 5 years    1. Have you been to the ER, urgent care clinic since your last visit? Hospitalized since your last visit? No    2. Have you seen or consulted any other health care providers outside of the [address] [place] [address] since your last visit? Include any pap smears or colon screening.  No    Verbal order received per Dr. Lilia Rivera- obtain UA without micro- VORB

## 2018-04-02 NOTE — PROGRESS NOTES
This is a Subsequent Medicare Annual Wellness Visit providing Personalized Prevention Plan Services (PPPS) (Performed 12 months after initial AWV and PPPS )    I have reviewed the patient's medical history in detail and updated the computerized patient record. Doing well. Hypertriglyceridemia follow up:  Compliant w/ meds, low fat, low cholesterol diet. Exercising some. No muscle nor abdominal pain, no skin discoloration. Labs done per endo. Osteoarthritis:  Patient has osteoarthritis. Has various joint aches but overall doing well. Symptoms onset: problem is longstanding. Rheumatological ROS: stable, mild-to-moderate joint symptoms intermittently, reasonably well controlled by PRN meds. Response to treatment plan: stable and intermittent. Glucose intolerance reveiw:  She has IGT. Labs done requested by endo to be done. Diabetic ROS - further diabetic ROS: no polyuria or polydipsia, no chest pain, dyspnea or TIA's, no numbness, tingling or pain in extremities, no unusual visual symptoms. Lab review: orders written for new lab studies as appropriate; see orders. History     Past Medical History:   Diagnosis Date    GERD (gastroesophageal reflux disease) 2/23/2010    Hypertriglyceridemia 2/23/2010    Hypovitaminosis D     OA (osteoarthritis) 2/23/2010    Osteoarthritis     Vitamin D deficiency 2/23/2010      Past Surgical History:   Procedure Laterality Date    HX HEENT  12/08    cervical neck stenosis s/p cervical release    HX HIP REPLACEMENT  6/2007    right    HX HIP REPLACEMENT  12/2007    left    HX ORTHOPAEDIC  12/2008    cervical release    [place] COLONOSCOPY FLX DX W/COLLJ SPEC WHEN PFRMD  06/2006    [place] COLONOSCOPY FLX DX W/COLLJ SPEC WHEN PFRMD  08/29/2011    Dr. Eric Herrera     Current Outpatient Prescriptions   Medication Sig Dispense Refill    promethazine-codeine (PHENERGAN WITH CODEINE) 6.25-10 mg/5 mL syrup Take 5 mL by mouth every six (6) hours as needed for Cough. Max Daily Amount: 20 mL. 240 mL 1    pantoprazole (PROTONIX) 40 mg tablet Take 1 Tab by mouth daily. 90 Tab 3    LORazepam (ATIVAN) 1 mg tablet Take 0.5-1 Tabs by mouth every eight (8) hours as needed for Anxiety. 90 Tab 1    gemfibrozil (LOPID) 600 mg tablet Take 1 Tab by mouth two (2) times a day. 180 Tab 3    ibuprofen (MOTRIN) 200 mg tablet Take 200 mg by mouth every six (6) hours as needed for Pain.  aspirin 81 mg chewable tablet Take 81 mg by mouth daily.  cetirizine (ZYRTEC) 10 mg tablet Take 10 mg by mouth daily as needed for Allergies.  Cholecalciferol, Vitamin D3, (VITAMIN D3) 1,000 unit Cap Take 1 Cap by mouth daily. Allergies   Allergen Reactions    Macrobid [Nitrofurantoin Monohyd/M-Cryst] Rash    Pcn [Penicillins] Hives     Family History   Problem Relation Age of Onset    Hypertension Mother     Heart Failure Mother     Cancer Father      colon    Lung Disease Brother     Arthritis-rheumatoid Brother     Heart Disease Brother      Social History   Substance Use Topics    Smoking status: Current Every Day Smoker     Packs/day: 0.25     Years: 55.00    Smokeless tobacco: Never Used    Alcohol use 0.0 oz/week     0 Standard drinks or equivalent per week      Comment: 1 drink a month     Patient Active Problem List   Diagnosis Code    Hypertriglyceridemia E78.1    GERD (gastroesophageal reflux disease) K21.9    OA (osteoarthritis) M19.90    Vitamin D deficiency E55.9    Anxiety F41.9    Environmental allergies Z91.09    Encounter for medication monitoring Z51.81    IGT (impaired glucose tolerance) R73.02       Depression Risk Factor Screening:     PHQ over the last two weeks 4/2/2018   Little interest or pleasure in doing things Not at all   Feeling down, depressed or hopeless Not at all   Total Score PHQ 2 0     Alcohol Risk Factor Screening: On any occasion during the past 3 months, have you had more than 3 drinks containing alcohol?   No    Do you average more than 7 drinks per week? No      Functional Ability and Level of Safety:     Hearing Loss   Normal     Activities of Daily Living   Self-care. Requires assistance with: no ADLs    Fall Risk     Fall Risk Assessment, last 12 mths 4/2/2018   Able to walk? Yes   Fall in past 12 months? No     Functional Ability:   Does the patient exhibit a steady gait? yes    How long did it take the patient to get up and walk from a sitting position? seconds    Is the patient self reliant? (ie can do own laundry, meals, household chores)  yes   Does the patient handle his/her own medications? yes   Does the patient handle his/her own money? yes   Is the patients home safe (ie good lighting, handrails on stairs and bath, etc.)? yes   Did you notice or did patient express any hearing difficulties? no   Did you notice or did patient express any vision difficulties?  no   Were distance and reading eye charts used? yes     Advance Care Planning:   Patient was offered the opportunity to discuss advance care planning:  yes    Does patient have an Advance Directive:  no   If no, did you provide information on Caring Connections? yes          Abuse Screen   Patient is not abused    Review of Systems   Constitutional: negative for fatigue and malaise  Eyes: negative for irritation and redness  Ears, nose, mouth, throat, and face: negative for earaches, nasal congestion and hoarseness  Respiratory: negative for cough, sputum or dyspnea on exertion  Cardiovascular: negative for chest pain, chest pressure/discomfort, dyspnea, palpitations, irregular heart beats, fatigue, lower extremity edema  Gastrointestinal: negative for dysphagia, dyspepsia, reflux symptoms, nausea, vomiting, change in bowel habits, melena, constipation and abdominal pain  Genitourinary:negative for frequency, dysuria, nocturia, urinary incontinence.   Integument/breast: negative for rash and dryness  Hematologic/lymphatic: negative for easy bruising and lymphadenopathy  Musculoskeletal:negative for myalgias, arthralgias, stiff joints, neck pain, back pain and muscle weakness  Neurological: negative for headaches, dizziness, tremor and weakness  Endocrine: negative for diabetic symptoms including polyuria and polydipsia      Physical Examination     Evaluation of Cognitive Function:  Mood/affect:  happy  Appearance: age appropriate  Family member/caregiver input: NA    Physical Exam   Constitutional: She appears well-developed and well-nourished. Visit Vitals    /63 (BP 1 Location: Left arm, BP Patient Position: Sitting)    Pulse 69    Temp 96.7 °F (35.9 °C) (Oral)    Resp 16    Ht 5' 1\" (1.549 m)    Wt 157 lb 3.2 oz (71.3 kg)    SpO2 97%    BMI 29.7 kg/m2          HENT:   Right Ear: Tympanic membrane and ear canal normal.   Left Ear: Tympanic membrane and ear canal normal.   Nose: No mucosal edema or rhinorrhea. Mouth/Throat: Oropharynx is clear and moist and mucous membranes are normal.   Neck: Normal range of motion. Neck supple. No thyromegaly present. Cardiovascular: Normal rate and regular rhythm. No murmur heard. Pulmonary/Chest: Effort normal and breath sounds normal.   Abdominal: Soft. Bowel sounds are normal. There is no tenderness. Musculoskeletal: Normal range of motion. She exhibits no edema. Lymphadenopathy:     She has no cervical adenopathy. Skin: Skin is warm and dry. Psychiatric: She has a normal mood and affect. Rectal: heme negative. Nursing note and vitals reviewed. Patient Care Team:  Magdiel Handy MD as PCP - General    Advice/Referrals/Counseling   Education and counseling provided:  Are appropriate based on today's review and evaluation  End-of-Life planning (with patient's consent)      Assessment/Plan   Kourtney Diaz was seen today for annual wellness visit. ASSESSMENT and PLAN  Diagnoses and all orders for this visit:    1.  Medicare annual wellness visit, subsequent  -     AMB POC URINALYSIS DIP STICK AUTO W/O MICRO    2. Hypertriglyceridemia  -     LIPID PANEL    3. IGT (impaired glucose tolerance)  -     AMB POC HEMOGLOBIN A1C  -     AMB POC GLUCOSE, QUANTITATIVE, BLOOD    4. Primary osteoarthritis involving multiple joints  Stable     5. Encounter for medication monitoring  -     METABOLIC PANEL, COMPREHENSIVE  -     CBC W/O DIFF    6. Encounter for screening fecal occult blood testing  -     AMB POC FECAL BLOOD, OCCULT, QL 1 CARD    7. Long-term use of aspirin therapy  -     AMB POC FECAL BLOOD, OCCULT, QL 1 CARD    8. Anxiety  -     Refill LORazepam (ATIVAN) 1 mg tablet; Take 0.5-1 Tabs by mouth every eight (8) hours as needed for Anxiety. Follow-up Disposition:  Return in about 6 months (around 10/2/2018). reviewed diet, exercise and weight control  cardiovascular risk and specific lipid/LDL goals reviewed  reviewed medications and side effects in detail  specific diabetic recommendations: low cholesterol diet, weight control and daily exercise discussed and glycohemoglobin and other lab monitoring discussed      I have discussed diagnosis listed in this note with pt and/or family. I have discussed treatment plans and options and the risk/benefit analysis of those options, including safe use of medications and possible medication side effects. Through the use of shared decision making we have agreed to the above plan. The patient has received an after-visit summary and questions were answered concerning future plans and follow up. Advise pt of any urgent changes then to proceed to the ER.

## 2018-04-02 NOTE — MR AVS SNAPSHOT
[address] [facility] 
 
 
 [address] [place], [place] [address] [address] [phone] Patient: Issa Merritt MRN: [id number] HRP:[birth year]509 Visit Information Date & Time Provider Department Dept. Phone Encounter #  
 4/2/2018  7:30 AM Pepe Boyce Edwin Siegel [phone] [id number] Follow-up Instructions Return in about 6 months (around 10/2/2018). Upcoming Health Maintenance Date Due  
 GLAUCOMA SCREENING Q2Y 10/10/2018 MEDICARE YEARLY EXAM 4/3/2019 COLONOSCOPY 12/14/2021 DTaP/Tdap/Td series (2 - Td) 3/21/2022 Allergies as of 4/2/2018  Review Complete On: 4/2/2018 By: Pepe Boyce MD  
  
 Severity Noted Reaction Type Reactions Macrobid [Nitrofurantoin Monohyd/m-cryst]  08/24/2012    Rash Pcn [Penicillins]  02/23/2010    Hives Current Immunizations  Reviewed on 4/2/2018 Name Date Hib 9/17/2013 Influenza High Dose Vaccine PF 9/11/2017, 9/2/2016, 8/26/2015 Influenza Vaccine 9/15/2014 Influenza Vaccine Split 8/29/2012, 9/21/2011 Pneumococcal Conjugate (PCV-13) 8/19/2015 Pneumococcal Vaccine (Pcv) 11/21/2007 TDAP Vaccine 3/21/2012 Varicella Virus Vaccine Live 12/6/2012 Reviewed by Pepe Boyce MD on 4/2/2018 at  7:53 AM  
You Were Diagnosed With   
  
 Codes Comments Medicare annual wellness visit, subsequent    -  Primary ICD-10-CM: Z00.00 ICD-9-CM: V70.0 Hypertriglyceridemia     ICD-10-CM: E78.1 ICD-9-CM: 272.1 IGT (impaired glucose tolerance)     ICD-10-CM: R73.02 
ICD-9-CM: 790.22 Primary osteoarthritis involving multiple joints     ICD-10-CM: M15.0 ICD-9-CM: 715.09 Encounter for medication monitoring     ICD-10-CM: Z51.81 
ICD-9-CM: V58.83 Encounter for screening fecal occult blood testing     ICD-10-CM: Z12.11 ICD-9-CM: V76.51 Long-term use of aspirin therapy     ICD-10-CM: Z79.82 ICD-9-CM: V58.66   
 Anxiety     ICD-10-CM: F41.9 ICD-9-CM: 300.00 Vitals BP Pulse Temp Resp Height(growth percentile) Weight(growth percentile) 127/63 (BP 1 Location: Left arm, BP Patient Position: Sitting) 69 96.7 °F (35.9 °C) (Oral) 16 5' 1\" (1.549 m) 157 lb 3.2 oz (71.3 kg) SpO2 BMI OB Status Smoking Status 97% 29.7 kg/m2 Postmenopausal Current Every Day Smoker Vitals History BMI and BSA Data Body Mass Index Body Surface Area  
 29.7 kg/m 2 1.75 m 2 Your Updated Medication List  
  
   
This list is accurate as of 4/2/18  8:27 AM.  Always use your most recent med list.  
  
  
  
  
 aspirin 81 mg chewable tablet Take 81 mg by mouth daily. cetirizine 10 mg tablet Commonly known as:  ZYRTEC Take 10 mg by mouth daily as needed for Allergies. gemfibrozil 600 mg tablet Commonly known as:  LOPID Take 1 Tab by mouth two (2) times a day. ibuprofen 200 mg tablet Commonly known as:  MOTRIN Take 200 mg by mouth every six (6) hours as needed for Pain. LORazepam 1 mg tablet Commonly known as:  ATIVAN Take 0.5-1 Tabs by mouth every eight (8) hours as needed for Anxiety. pantoprazole 40 mg tablet Commonly known as:  PROTONIX Take 1 Tab by mouth daily. promethazine-codeine 6.25-10 mg/5 mL syrup Commonly known as:  PHENERGAN with CODEINE Take 5 mL by mouth every six (6) hours as needed for Cough. Max Daily Amount: 20 mL. VITAMIN D3 1,000 unit Cap Generic drug:  cholecalciferol Take 1 Cap by mouth daily. Prescriptions Printed Refills LORazepam (ATIVAN) 1 mg tablet 1 Sig: Take 0.5-1 Tabs by mouth every eight (8) hours as needed for Anxiety. Class: Print Route: Oral  
  
We Performed the Following AMB POC FECAL BLOOD, OCCULT, QL 1 CARD [85386 CPT(R)] AMB POC GLUCOSE, QUANTITATIVE, BLOOD [90377 CPT(R)] AMB POC HEMOGLOBIN A1C [71835 CPT(R)] AMB POC URINALYSIS DIP STICK AUTO W/O MICRO [52905 CPT(R)] CBC W/O DIFF [75704 CPT(R)] LIPID PANEL [64810 CPT(R)] METABOLIC PANEL, COMPREHENSIVE [31066 CPT(R)] Follow-up Instructions Return in about 6 months (around 10/2/2018). Introducing [facility]! Jose Luis Avlia introduces Sara Campbell patient portal. Now you can access parts of your medical record, email your doctor's office, and request medication refills online. 1. In your internet browser, go to [email or web address] 2. Click on the First Time User? Click Here link in the Sign In box. You will see the New Member Sign Up page. 3. Enter your Sara Campbell Access Code exactly as it appears below. You will not need to use this code after youve completed the sign-up process. If you do not sign up before the expiration date, you must request a new code. · Sara Campbell Access Code: [email or web address] Expires: 7/1/2018  8:25 AM 
 
4. Enter the last four digits of your Social Security Number (xxxx) and Date of Birth (mm/dd/yyyy) as indicated and click Submit. You will be taken to the next sign-up page. 5. Create a Sara Campbell ID. This will be your Sara Campbell login ID and cannot be changed, so think of one that is secure and easy to remember. 6. Create a Sara Campbell password. You can change your password at any time. 7. Enter your Password Reset Question and Answer. This can be used at a later time if you forget your password. 8. Enter your e-mail address. You will receive e-mail notification when new information is available in [address] 9. Click Sign Up. You can now view and download portions of your medical record. 10. Click the Download Summary menu link to download a portable copy of your medical information. If you have questions, please visit the Frequently Asked Questions section of the Sara Campbell website. Remember, Sara Campbell is NOT to be used for urgent needs. For medical emergencies, dial 911. Now available from your iPhone and Android! Please provide this summary of care documentation to your next provider. Your primary care clinician is listed as Shmuel Blake. If you have any questions after today's visit, please call [phone].

## 2018-04-03 LAB
ALBUMIN SERPL-MCNC: 4.8 G/DL (ref 3.5–4.8)
ALBUMIN/GLOB SERPL: 2 {RATIO} (ref 1.2–2.2)
ALP SERPL-CCNC: 92 IU/L (ref 39–117)
ALT SERPL-CCNC: 10 IU/L (ref 0–32)
AST SERPL-CCNC: 19 IU/L (ref 0–40)
BILIRUB SERPL-MCNC: 0.2 MG/DL (ref 0–1.2)
BUN SERPL-MCNC: 20 MG/DL (ref 8–27)
BUN/CREAT SERPL: 18 (ref 12–28)
CALCIUM SERPL-MCNC: 10.6 MG/DL (ref 8.7–10.3)
CHLORIDE SERPL-SCNC: 102 MMOL/L (ref 96–106)
CHOLEST SERPL-MCNC: 208 MG/DL (ref 100–199)
CO2 SERPL-SCNC: 20 MMOL/L (ref 18–29)
CREAT SERPL-MCNC: 1.1 MG/DL (ref 0.57–1)
ERYTHROCYTE [DISTWIDTH] IN BLOOD BY AUTOMATED COUNT: 13.7 % (ref 12.3–15.4)
GFR SERPLBLD CREATININE-BSD FMLA CKD-EPI: 49 ML/MIN/1.73
GFR SERPLBLD CREATININE-BSD FMLA CKD-EPI: 56 ML/MIN/1.73
GLOBULIN SER CALC-MCNC: 2.4 G/DL (ref 1.5–4.5)
GLUCOSE SERPL-MCNC: 86 MG/DL (ref 65–99)
HCT VFR BLD AUTO: 41.3 % (ref 34–46.6)
HDLC SERPL-MCNC: 60 MG/DL
HGB BLD-MCNC: 13.4 G/DL (ref 11.1–15.9)
INTERPRETATION, 910389: NORMAL
INTERPRETATION: NORMAL
LDLC SERPL CALC-MCNC: 123 MG/DL (ref 0–99)
MCH RBC QN AUTO: 28.8 PG (ref 26.6–33)
MCHC RBC AUTO-ENTMCNC: 32.4 G/DL (ref 31.5–35.7)
MCV RBC AUTO: 89 FL (ref 79–97)
PDF IMAGE, 910387: NORMAL
PLATELET # BLD AUTO: 387 X10E3/UL (ref 150–379)
POTASSIUM SERPL-SCNC: 5 MMOL/L (ref 3.5–5.2)
PROT SERPL-MCNC: 7.2 G/DL (ref 6–8.5)
RBC # BLD AUTO: 4.65 X10E6/UL (ref 3.77–5.28)
SODIUM SERPL-SCNC: 141 MMOL/L (ref 134–144)
TRIGL SERPL-MCNC: 126 MG/DL (ref 0–149)
VLDLC SERPL CALC-MCNC: 25 MG/DL (ref 5–40)
WBC # BLD AUTO: 6.9 X10E3/UL (ref 3.4–10.8)

## 2018-06-04 ENCOUNTER — OFFICE VISIT (OUTPATIENT)
Dept: FAMILY MEDICINE CLINIC | Age: 78
End: 2018-06-04

## 2018-06-04 VITALS
WEIGHT: 160 LBS | TEMPERATURE: 97.3 F | HEIGHT: 61 IN | DIASTOLIC BLOOD PRESSURE: 60 MMHG | HEART RATE: 75 BPM | BODY MASS INDEX: 30.21 KG/M2 | SYSTOLIC BLOOD PRESSURE: 128 MMHG | RESPIRATION RATE: 20 BRPM

## 2018-06-04 DIAGNOSIS — H25.9 SENILE CATARACT OF LEFT EYE, UNSPECIFIED AGE-RELATED CATARACT TYPE: ICD-10-CM

## 2018-06-04 DIAGNOSIS — Z01.818 PRE-OP EXAMINATION: Primary | ICD-10-CM

## 2018-06-04 DIAGNOSIS — E78.1 HYPERTRIGLYCERIDEMIA: ICD-10-CM

## 2018-06-04 DIAGNOSIS — Z51.81 ENCOUNTER FOR MEDICATION MONITORING: ICD-10-CM

## 2018-06-04 DIAGNOSIS — R73.02 IGT (IMPAIRED GLUCOSE TOLERANCE): ICD-10-CM

## 2018-06-04 DIAGNOSIS — M15.9 PRIMARY OSTEOARTHRITIS INVOLVING MULTIPLE JOINTS: ICD-10-CM

## 2018-06-04 NOTE — MR AVS SNAPSHOT
[facility] 
 
 
 [address] [facility] [place] [address] [address] 
[phone] Patient: Chelle Bernard MRN: [id number] AZM:[birth year] Visit Information Date & Time Provider Department Dept. Phone Encounter #  
 6/4/2018  2:45 PM Leticia Garnica MD [facility] [phone] [id number] Your Appointments 10/2/2018  7:30 AM  
ROUTINE CARE with Leticia Garnica MD  
[facility] [address] [facility]) Appt Note: 6mth f/u  
 [address] [facility] [place] [address] [address]  
[phone] [address] [facility] [address] Upcoming Health Maintenance Date Due Influenza Age 5 to Adult 8/1/2018 GLAUCOMA SCREENING Q2Y 10/10/2018 MEDICARE YEARLY EXAM 4/3/2019 COLONOSCOPY 12/14/2021 DTaP/Tdap/Td series (2 - Td) 3/21/2022 Allergies as of 6/4/2018  Review Complete On: 6/4/2018 By: Leticia Garnica MD  
  
 Severity Noted Reaction Type Reactions Macrobid [Nitrofurantoin Monohyd/m-cryst]  08/24/2012    Rash Pcn [Penicillins]  02/23/2010    Hives Current Immunizations  Reviewed on 6/4/2018 Name Date Hib 9/17/2013 Influenza High Dose Vaccine PF 9/11/2017, 9/2/2016, 8/26/2015 Influenza Vaccine 9/15/2014 Influenza Vaccine Split 8/29/2012, 9/21/2011 Pneumococcal Conjugate (PCV-13) 8/19/2015 Pneumococcal Vaccine (Pcv) 11/21/2007 TDAP Vaccine 3/21/2012 Varicella Virus Vaccine Live 12/6/2012 Zoster Recombinant 4/11/2018 Reviewed by Leticia Garnica MD on 6/4/2018 at  3:11 PM  
Vitals BP Pulse Temp Resp Height(growth percentile) Weight(growth percentile) 128/60 (BP 1 Location: Left arm, BP Patient Position: Sitting) 75 97.3 °F (36.3 °C) 20 5' 1\" (1.549 m) 160 lb (72.6 kg) BMI OB Status Smoking Status 30.23 kg/m2 Postmenopausal Current Every Day Smoker BMI and BSA Data Body Mass Index Body Surface Area  
 30.23 kg/m 2 1.77 m 2 Your Updated Medication List  
  
   
This list is accurate as of 6/4/18  3:12 PM.  Always use your most recent med list.  
  
  
  
  
 aspirin 81 mg chewable tablet Take 81 mg by mouth daily. cetirizine 10 mg tablet Commonly known as:  ZYRTEC Take 10 mg by mouth daily. gemfibrozil 600 mg tablet Commonly known as:  LOPID Take 1 Tab by mouth two (2) times a day. ibuprofen 200 mg tablet Commonly known as:  MOTRIN Take 200 mg by mouth every six (6) hours as needed for Pain. LORazepam 1 mg tablet Commonly known as:  ATIVAN Take 0.5-1 Tabs by mouth every eight (8) hours as needed for Anxiety. pantoprazole 40 mg tablet Commonly known as:  PROTONIX Take 1 Tab by mouth daily. promethazine-codeine 6.25-10 mg/5 mL syrup Commonly known as:  PHENERGAN with CODEINE Take 5 mL by mouth every six (6) hours as needed for Cough. Max Daily Amount: 20 mL. VITAMIN D3 1,000 unit Cap Generic drug:  cholecalciferol Take 1 Cap by mouth daily. Introducing [facility]! [facility] introduces [employer] patient portal. Now you can access parts of your medical record, email your doctor's office, and request medication refills online. 1. In your internet browser, go to [email or web address] 2. Click on the First Time User? Click Here link in the Sign In box. You will see the New Member Sign Up page. 3. Enter your [employer] Access Code exactly as it appears below. You will not need to use this code after youve completed the sign-up process. If you do not sign up before the expiration date, you must request a new code. · [employer] Access Code: [email or web address] Expires: 7/1/2018  8:25 AM 
 
4. Enter the last four digits of your Social Security Number (xxxx) and Date of Birth (mm/dd/yyyy) as indicated and click Submit. You will be taken to the next sign-up page. 5. Create a gifted2you ID. This will be your gifted2you login ID and cannot be changed, so think of one that is secure and easy to remember. 6. Create a gifted2you password. You can change your password at any time. 7. Enter your Password Reset Question and Answer. This can be used at a later time if you forget your password. 8. Enter your e-mail address. You will receive e-mail notification when new information is available in [address] 9. Click Sign Up. You can now view and download portions of your medical record. 10. Click the Download Summary menu link to download a portable copy of your medical information. If you have questions, please visit the Frequently Asked Questions section of the gifted2you website. Remember, gifted2you is NOT to be used for urgent needs. For medical emergencies, dial 911. Now available from your iPhone and Android! Please provide this summary of care documentation to your next provider. Your primary care clinician is listed as Magno Nguyen. If you have any questions after today's visit, please call [phone].

## 2018-06-04 NOTE — PROGRESS NOTES
Chief Complaint   Patient presents with    Pre-op Exam     patient is to have cataract surgery     Mammogram 09/12/2017    Colonoscopy 12/14/2016    Patient stated last eye exam 05/2018    Patient denies pain at this time. 1. Have you been to the ER, urgent care clinic since your last visit? Hospitalized since your last visit? No    2. Have you seen or consulted any other health care providers outside of the [facility] since your last visit? Include any pap smears or colon screening.  No

## 2018-06-04 NOTE — PROGRESS NOTES
HISTORY OF PRESENT ILLNESS  Kobi Person is a 69 y.o. female. HPI   Pre-op for cataract surgery. Hypertriglyceridemia follow up:  Compliant w/ meds, low fat, low cholesterol diet. Exercising some. No muscle nor abdominal pain, no skin discoloration. Osteoarthritis:  Patient has osteoarthritis. Has various joint aches but overall doing well. Symptoms onset: problem is longstanding. Rheumatological ROS: stable, mild-to-moderate joint symptoms intermittently, reasonably well controlled by PRN meds. Response to treatment plan: stable and intermittent. Glucose intolerance reveiw:  She has IGT. Diabetic ROS - further diabetic ROS: no polyuria or polydipsia, no chest pain, dyspnea or TIA's, no numbness, tingling or pain in extremities, no unusual visual symptoms. Lab review: orders written for new lab studies as appropriate; see orders. Patient Active Problem List   Diagnosis Code    Hypertriglyceridemia E78.1    GERD (gastroesophageal reflux disease) K21.9    OA (osteoarthritis) M19.90    Vitamin D deficiency E55.9    Anxiety F41.9    Environmental allergies Z91.09    Encounter for medication monitoring Z51.81    IGT (impaired glucose tolerance) R73.02       Current Outpatient Prescriptions   Medication Sig Dispense Refill    LORazepam (ATIVAN) 1 mg tablet Take 0.5-1 Tabs by mouth every eight (8) hours as needed for Anxiety. 90 Tab 1    promethazine-codeine (PHENERGAN WITH CODEINE) 6.25-10 mg/5 mL syrup Take 5 mL by mouth every six (6) hours as needed for Cough. Max Daily Amount: 20 mL. 240 mL 1    pantoprazole (PROTONIX) 40 mg tablet Take 1 Tab by mouth daily. 90 Tab 3    gemfibrozil (LOPID) 600 mg tablet Take 1 Tab by mouth two (2) times a day. 180 Tab 3    ibuprofen (MOTRIN) 200 mg tablet Take 200 mg by mouth every six (6) hours as needed for Pain.  aspirin 81 mg chewable tablet Take 81 mg by mouth daily.       cetirizine (ZYRTEC) 10 mg tablet Take 10 mg by mouth daily.      Cholecalciferol, Vitamin D3, (VITAMIN D3) 1,000 unit Cap Take 1 Cap by mouth daily. Allergies   Allergen Reactions    Macrobid [Nitrofurantoin Monohyd/M-Cryst] Rash    Pcn [Penicillins] Hives       Past Medical History:   Diagnosis Date    GERD (gastroesophageal reflux disease) 2/23/2010    Hypertriglyceridemia 2/23/2010    Hypovitaminosis D     OA (osteoarthritis) 2/23/2010    Osteoarthritis     Vitamin D deficiency 2/23/2010       Past Surgical History:   Procedure Laterality Date    HX ACL RECONSTRUCTION  1998    HX HEENT  12/08    cervical neck stenosis s/p cervical release    HX HERNIA REPAIR  3366    umbilical    HX HIP REPLACEMENT  6/2007    right    HX HIP REPLACEMENT  12/2007    left    HX ORTHOPAEDIC  12/2008    cervical release    [place] COLONOSCOPY FLX DX W/COLLJ SPEC WHEN PFRMD  06/2006    [place] COLONOSCOPY FLX DX W/COLLJ SPEC WHEN PFRMD  08/29/2011    Dr. Rosemary Gregorio       Family History   Problem Relation Age of Onset    Hypertension Mother     Heart Failure Mother     Cancer Father      colon    Lung Disease Brother     Arthritis-rheumatoid Brother     Heart Disease Brother        Social History   Substance Use Topics    Smoking status: Current Every Day Smoker     Packs/day: 0.25     Years: 55.00    Smokeless tobacco: Never Used    Alcohol use 0.0 oz/week     0 Standard drinks or equivalent per week      Comment: 1 drink a month          Review of Systems   Constitutional: Negative for malaise/fatigue. HENT: Negative for congestion. Eyes: Negative for blurred vision. Respiratory: Negative for cough and shortness of breath. Cardiovascular: Negative for chest pain, palpitations and leg swelling. Gastrointestinal: Negative for abdominal pain, constipation and heartburn. Genitourinary: Negative for dysuria, frequency and urgency. Musculoskeletal: Negative for back pain and joint pain. Neurological: Negative for dizziness, tingling and headaches. Endo/Heme/Allergies: Negative for environmental allergies. Psychiatric/Behavioral: Negative for depression. The patient does not have insomnia. Physical Exam   Constitutional: She appears well-developed and well-nourished. /60 (BP 1 Location: Left arm, BP Patient Position: Sitting)  Pulse 75  Temp 97.3 °F (36.3 °C)  Resp 20  Ht 5' 1\" (1.549 m)  Wt 160 lb (72.6 kg)  BMI 30.23 kg/m2   HENT:   Right Ear: Tympanic membrane and ear canal normal.   Left Ear: Tympanic membrane and ear canal normal.   Nose: No mucosal edema or rhinorrhea. Mouth/Throat: Oropharynx is clear and moist and mucous membranes are normal.   Neck: Normal range of motion. Neck supple. No thyromegaly present. Cardiovascular: Normal rate and regular rhythm. No murmur heard. Pulmonary/Chest: Effort normal and breath sounds normal.   Abdominal: Soft. Bowel sounds are normal. There is no tenderness. Musculoskeletal: Normal range of motion. She exhibits no edema. Lymphadenopathy:     She has no cervical adenopathy. Skin: Skin is warm and dry. Psychiatric: She has a normal mood and affect. Nursing note and vitals reviewed. ASSESSMENT and PLAN  Diagnoses and all orders for this visit:    1. Pre-op examination//  2. Senile cataract of left eye, unspecified age-related cataract type  Medically stable for surgery. 3. Hypertriglyceridemia  Stable on current meds    4. IGT (impaired glucose tolerance)  Continue to monitor. Work on diet and exercise. 5. Primary osteoarthritis involving multiple joints  Stable      6. Environmental Allergies  Stable with zyrtec. 6. Encounter for medication monitoring      Follow-up Disposition:   reviewed diet, exercise and weight control  cardiovascular risk and specific lipid/LDL goals reviewed    I have discussed diagnosis listed in this note with pt and/or family.  I have discussed treatment plans and options and the risk/benefit analysis of those options, including safe use of medications and possible medication side effects. Through the use of shared decision making we have agreed to the above plan. The patient has received an after-visit summary and questions were answered concerning future plans and follow up. Advise pt of any urgent changes then to proceed to the ER.

## 2018-10-02 ENCOUNTER — OFFICE VISIT (OUTPATIENT)
Dept: FAMILY MEDICINE CLINIC | Age: 78
End: 2018-10-02

## 2018-10-02 VITALS
HEART RATE: 66 BPM | HEIGHT: 61 IN | TEMPERATURE: 96.7 F | RESPIRATION RATE: 18 BRPM | WEIGHT: 163 LBS | SYSTOLIC BLOOD PRESSURE: 110 MMHG | DIASTOLIC BLOOD PRESSURE: 60 MMHG | OXYGEN SATURATION: 97 % | BODY MASS INDEX: 30.78 KG/M2

## 2018-10-02 DIAGNOSIS — Z23 ENCOUNTER FOR IMMUNIZATION: ICD-10-CM

## 2018-10-02 DIAGNOSIS — M15.9 PRIMARY OSTEOARTHRITIS INVOLVING MULTIPLE JOINTS: ICD-10-CM

## 2018-10-02 DIAGNOSIS — F41.9 ANXIETY: ICD-10-CM

## 2018-10-02 DIAGNOSIS — K21.9 GASTROESOPHAGEAL REFLUX DISEASE WITHOUT ESOPHAGITIS: ICD-10-CM

## 2018-10-02 DIAGNOSIS — Z51.81 ENCOUNTER FOR MEDICATION MONITORING: ICD-10-CM

## 2018-10-02 DIAGNOSIS — R73.02 IGT (IMPAIRED GLUCOSE TOLERANCE): ICD-10-CM

## 2018-10-02 DIAGNOSIS — E55.9 VITAMIN D DEFICIENCY: ICD-10-CM

## 2018-10-02 DIAGNOSIS — E78.1 HYPERTRIGLYCERIDEMIA: Primary | ICD-10-CM

## 2018-10-02 RX ORDER — KETOROLAC TROMETHAMINE 0.5 %
DROPS OPHTHALMIC (EYE)
COMMUNITY
Start: 2018-07-06 | End: 2018-10-02 | Stop reason: ALTCHOICE

## 2018-10-02 RX ORDER — GEMFIBROZIL 600 MG/1
600 TABLET, FILM COATED ORAL 2 TIMES DAILY
Qty: 180 TAB | Refills: 3 | Status: SHIPPED | OUTPATIENT
Start: 2018-10-02 | End: 2019-10-02 | Stop reason: SDUPTHER

## 2018-10-02 RX ORDER — OFLOXACIN 3 MG/ML
SOLUTION/ DROPS OPHTHALMIC
COMMUNITY
Start: 2018-07-05 | End: 2018-10-02 | Stop reason: ALTCHOICE

## 2018-10-02 RX ORDER — PANTOPRAZOLE SODIUM 40 MG/1
40 TABLET, DELAYED RELEASE ORAL DAILY
Qty: 90 TAB | Refills: 3 | Status: SHIPPED | OUTPATIENT
Start: 2018-10-02 | End: 2019-10-02 | Stop reason: SDUPTHER

## 2018-10-02 RX ORDER — LORAZEPAM 1 MG/1
.5-1 TABLET ORAL
Qty: 90 TAB | Refills: 1 | Status: SHIPPED | OUTPATIENT
Start: 2018-10-02 | End: 2019-04-02 | Stop reason: SDUPTHER

## 2018-10-02 RX ORDER — ZOSTER VACCINE RECOMBINANT, ADJUVANTED 50 MCG/0.5
KIT INTRAMUSCULAR
Refills: 0 | COMMUNITY
Start: 2018-07-16 | End: 2018-10-02 | Stop reason: ALTCHOICE

## 2018-10-02 NOTE — PROGRESS NOTES
Chief Complaint Patient presents with  Cholesterol Problem  
  follow up Patient states she has a mammogram yesterday at [facility]. Patient signed medical release. Colonoscopy 12/14/2016 by Dr. Manuel Castañeda- repeat in 5 years 1. Have you been to the ER, urgent care clinic since your last visit? Hospitalized since your last visit? No 
 
2. Have you seen or consulted any other health care providers outside of the [address] [place] [address] since your last visit? Include any pap smears or colon screening. No 
 
 
Verbal order received per Dr. Ca- flu vaccine IM- Aneudy Chaudhry Pt received flu vaccine IM in left deltoid without any difficulty.

## 2018-10-02 NOTE — MR AVS SNAPSHOT
[address] [facility] 
 
 
 [address] [facility] [address] [address] 
[phone] Patient: Stefanie Rooney MRN: [id number] HCH:5/7/1240 Visit Information Date & Time Provider Department Dept. Phone Encounter #  
 10/2/2018  7:30 AM Christen Guzman MD [facility] [phone] [id number] Follow-up Instructions Return in about 6 months (around 4/2/2019). Upcoming Health Maintenance Date Due Influenza Age 5 to Adult 10/22/2018* MEDICARE YEARLY EXAM 4/3/2019 GLAUCOMA SCREENING Q2Y 5/7/2020 COLONOSCOPY 12/14/2021 DTaP/Tdap/Td series (2 - Td) 3/21/2022 *Topic was postponed. The date shown is not the original due date. Allergies as of 10/2/2018  Review Complete On: 10/2/2018 By: Christen Guzman MD  
  
 Severity Noted Reaction Type Reactions Macrobid [Nitrofurantoin Monohyd/m-cryst]  08/24/2012    Rash Pcn [Penicillins]  02/23/2010    Hives Current Immunizations  Reviewed on 10/2/2018 Name Date Hib 9/17/2013 Influenza High Dose Vaccine PF 9/11/2017, 9/2/2016, 8/26/2015 Influenza Vaccine 9/15/2014 Influenza Vaccine (Tri) Adjuvanted 10/2/2018 Influenza Vaccine Split 8/29/2012, 9/21/2011 Pneumococcal Conjugate (PCV-13) 8/19/2015 Pneumococcal Vaccine (Pcv) 11/21/2007 TDAP Vaccine 3/21/2012 Varicella Virus Vaccine Live 12/6/2012 Zoster Recombinant 7/16/2018, 4/11/2018 Reviewed by Marko Villagomez LPN on 30/3/0491 at  7:39 AM  
 Reviewed by Christen Guzman MD on 10/2/2018 at  7:56 AM  
You Were Diagnosed With   
  
 Codes Comments Hypertriglyceridemia    -  Primary ICD-10-CM: E78.1 ICD-9-CM: 272.1 IGT (impaired glucose tolerance)     ICD-10-CM: R73.02 
ICD-9-CM: 790.22 Vitamin D deficiency     ICD-10-CM: E55.9 ICD-9-CM: 268.9 Primary osteoarthritis involving multiple joints     ICD-10-CM: M15.0 ICD-9-CM: 715.09   
 Encounter for medication monitoring     ICD-10-CM: Z51.81 
ICD-9-CM: V58.83 Encounter for immunization     ICD-10-CM: Q79 ICD-9-CM: V03.89 Gastroesophageal reflux disease without esophagitis     ICD-10-CM: K21.9 ICD-9-CM: 530.81 Anxiety     ICD-10-CM: F41.9 ICD-9-CM: 300.00 Vitals BP Pulse Temp Resp Height(growth percentile) Weight(growth percentile) 110/60 (BP 1 Location: Left arm, BP Patient Position: Sitting) 66 96.7 °F (35.9 °C) (Oral) 18 5' 1\" (1.549 m) 163 lb (73.9 kg) SpO2 BMI OB Status Smoking Status 97% 30.8 kg/m2 Postmenopausal Current Every Day Smoker Vitals History BMI and BSA Data Body Mass Index Body Surface Area  
 30.8 kg/m 2 1.78 m 2 Your Updated Medication List  
  
   
This list is accurate as of 10/2/18  8:04 AM.  Always use your most recent med list.  
  
  
  
  
 aspirin 81 mg chewable tablet Take 81 mg by mouth daily. cetirizine 10 mg tablet Commonly known as:  ZYRTEC Take 10 mg by mouth daily. gemfibrozil 600 mg tablet Commonly known as:  LOPID Take 1 Tab by mouth two (2) times a day. ibuprofen 200 mg tablet Commonly known as:  MOTRIN Take 200 mg by mouth every six (6) hours as needed for Pain. LORazepam 1 mg tablet Commonly known as:  ATIVAN Take 0.5-1 Tabs by mouth every eight (8) hours as needed for Anxiety. pantoprazole 40 mg tablet Commonly known as:  PROTONIX Take 1 Tab by mouth daily. promethazine-codeine 6.25-10 mg/5 mL syrup Commonly known as:  PHENERGAN with CODEINE Take 5 mL by mouth every six (6) hours as needed for Cough. Max Daily Amount: 20 mL. VITAMIN D3 1,000 unit Cap Generic drug:  cholecalciferol Take 1 Cap by mouth daily. Prescriptions Printed Refills  
 pantoprazole (PROTONIX) 40 mg tablet 3 Sig: Take 1 Tab by mouth daily. Class: Print  Route: Oral  
 gemfibrozil (LOPID) 600 mg tablet 3  
 Sig: Take 1 Tab by mouth two (2) times a day. Class: Print Route: Oral  
 LORazepam (ATIVAN) 1 mg tablet 1 Sig: Take 0.5-1 Tabs by mouth every eight (8) hours as needed for Anxiety. Class: Print Route: Oral  
  
We Performed the Following AMB POC HEMOGLOBIN A1C [63506 CPT(R)] INFLUENZA VACCINE INACTIVATED (IIV), SUBUNIT, ADJUVANTED, IM T1331974 CPT(R)] LIPID PANEL [33937 CPT(R)] METABOLIC PANEL, COMPREHENSIVE [02858 CPT(R)] PA IMMUNIZ ADMIN,1 SINGLE/COMB VAC/TOXOID [id number] CPT(R)] VITAMIN D, 25 HYDROXY T226012 CPT(R)] Follow-up Instructions Return in about 6 months (around 4/2/2019). Introducing [facility]! [facility] introduces [employer] patient portal. Now you can access parts of your medical record, email your doctor's office, and request medication refills online. 1. In your internet browser, go to [email or web address] 2. Click on the First Time User? Click Here link in the Sign In box. You will see the New Member Sign Up page. 3. Enter your [employer] Access Code exactly as it appears below. You will not need to use this code after youve completed the sign-up process. If you do not sign up before the expiration date, you must request a new code. · [employer] Access Code: [email or web address] Expires: 12/31/2018  7:20 AM 
 
4. Enter the last four digits of your Social Security Number (xxxx) and Date of Birth (mm/dd/yyyy) as indicated and click Submit. You will be taken to the next sign-up page. 5. Create a [employer] ID. This will be your [employer] login ID and cannot be changed, so think of one that is secure and easy to remember. 6. Create a [employer] password. You can change your password at any time. 7. Enter your Password Reset Question and Answer. This can be used at a later time if you forget your password. 8. Enter your e-mail address. You will receive e-mail notification when new information is available in [address] 9. Click Sign Up. You can now view and download portions of your medical record. 10. Click the Download Summary menu link to download a portable copy of your medical information. If you have questions, please visit the Frequently Asked Questions section of the [employer] website. Remember, [employer] is NOT to be used for urgent needs. For medical emergencies, dial 911. Now available from your iPhone and Android! Please provide this summary of care documentation to your next provider. Your primary care clinician is listed as Wendi Spears. If you have any questions after today's visit, please call [phone].

## 2018-10-02 NOTE — PROGRESS NOTES
HISTORY OF PRESENT ILLNESS Finn Shook is a 69 y.o. female. HPI Follow up on chronic medical problems. Doing well. Hypertriglyceridemia follow up: 
Compliant w/ meds, low fat, low cholesterol diet. Labs done by endo. Was told may need to get on something to help lower cholesterol. She is still taking lopid for the the triglycerides. Exercising some. No muscle nor abdominal pain, no skin discoloration. Labs done per endo. Osteoarthritis: 
Patient has osteoarthritis. Has various joint aches but overall doing well. Symptoms onset: problem is longstanding. Rheumatological ROS: stable, mild-to-moderate joint symptoms intermittently, reasonably well controlled by PRN meds. Response to treatment plan: stable and intermittent. Glucose intolerance reveiw: She has IGT. Diabetic ROS - further diabetic ROS: no polyuria or polydipsia, no chest pain, dyspnea or TIA's, no numbness, tingling or pain in extremities, no unusual visual symptoms. Lab review: orders written for new lab studies as appropriate; see orders. Patient Active Problem List  
Diagnosis Code  Hypertriglyceridemia E78.1  GERD (gastroesophageal reflux disease) K21.9  
 OA (osteoarthritis) M19.90  Vitamin D deficiency E55.9  Anxiety F41.9  Environmental allergies Z91.09  
 Encounter for medication monitoring Z51.81  
 IGT (impaired glucose tolerance) R73.02  
 
 
Current Outpatient Prescriptions Medication Sig Dispense Refill  LORazepam (ATIVAN) 1 mg tablet Take 0.5-1 Tabs by mouth every eight (8) hours as needed for Anxiety. 90 Tab 1  promethazine-codeine (PHENERGAN WITH CODEINE) 6.25-10 mg/5 mL syrup Take 5 mL by mouth every six (6) hours as needed for Cough. Max Daily Amount: 20 mL. 240 mL 1  
 pantoprazole (PROTONIX) 40 mg tablet Take 1 Tab by mouth daily. 90 Tab 3  
 gemfibrozil (LOPID) 600 mg tablet Take 1 Tab by mouth two (2) times a day.  180 Tab 3  
  ibuprofen (MOTRIN) 200 mg tablet Take 200 mg by mouth every six (6) hours as needed for Pain.  aspirin 81 mg chewable tablet Take 81 mg by mouth daily.  cetirizine (ZYRTEC) 10 mg tablet Take 10 mg by mouth daily.  Cholecalciferol, Vitamin D3, (VITAMIN D3) 1,000 unit Cap Take 1 Cap by mouth daily. Allergies Allergen Reactions  Macrobid [Nitrofurantoin Monohyd/M-Cryst] Rash  Pcn [Penicillins] Hives Past Medical History:  
Diagnosis Date  GERD (gastroesophageal reflux disease) 2/23/2010  Hypertriglyceridemia 2/23/2010  Hypovitaminosis D   
 OA (osteoarthritis) 2/23/2010  Osteoarthritis  Vitamin D deficiency 2/23/2010 Past Surgical History:  
Procedure Laterality Date [address]  HX CATARACT REMOVAL  06/2018  
 right eye  HX HEENT  12/08  
 cervical neck stenosis s/p cervical release  HX HERNIA REPAIR  0157  
 umbilical  
 HX HIP REPLACEMENT  6/2007  
 right  HX HIP REPLACEMENT  12/2007  
 left  HX ORTHOPAEDIC  12/2008  
 cervical release  [place] COLONOSCOPY FLX DX W/COLLJ SPEC WHEN PFRMD  06/2006  [place] COLONOSCOPY FLX DX W/COLLJ SPEC WHEN PFRMD  08/29/2011 Dr. Erika Wells Family History Problem Relation Age of Onset  Hypertension Mother  Heart Failure Mother  Cancer Father   
  colon  Lung Disease Brother  Arthritis-rheumatoid Brother  Heart Disease Brother Social History Substance Use Topics  Smoking status: Current Every Day Smoker Packs/day: 0.25 Years: 55.00  Smokeless tobacco: Never Used  Alcohol use 0.0 oz/week  
  0 Standard drinks or equivalent per week Comment: 1 drink a month Lab Results Component Value Date/Time WBC 6.9 04/02/2018 08:31 AM  
HGB 13.4 04/02/2018 08:31 AM  
HCT 41.3 04/02/2018 08:31 AM  
PLATELET 600 (H) 79/85/1162 08:31 AM  
MCV 89 04/02/2018 08:31 AM  
 
Lab Results Component Value Date/Time Cholesterol, total 208 (H) 04/02/2018 08:31 AM  
HDL Cholesterol 60 04/02/2018 08:31 AM  
LDL, calculated 123 (H) 04/02/2018 08:31 AM  
LDL-C, External 134 06/26/2015 07:42 AM  
Triglyceride 126 04/02/2018 08:31 AM  
CHOL/HDL Ratio 3.3 09/22/2010 10:02 AM  
 
Lab Results Component Value Date/Time TSH 0.13 (L) 09/23/2009 01:22 PM  
  
Lab Results Component Value Date/Time Sodium 141 04/02/2018 08:31 AM  
 Potassium 5.0 04/02/2018 08:31 AM  
 Chloride 102 04/02/2018 08:31 AM  
 CO2 20 04/02/2018 08:31 AM  
 Anion gap 10 09/23/2009 01:22 PM  
 Glucose 86 04/02/2018 08:31 AM  
 BUN 20 04/02/2018 08:31 AM  
 Creatinine 1.10 (H) 04/02/2018 08:31 AM  
 BUN/Creatinine ratio 18 04/02/2018 08:31 AM  
 GFR est AA 56 (L) 04/02/2018 08:31 AM  
 GFR est non-AA 49 (L) 04/02/2018 08:31 AM  
 Calcium 10.6 (H) 04/02/2018 08:31 AM  
 Bilirubin, total 0.2 04/02/2018 08:31 AM  
 ALT (SGPT) 10 04/02/2018 08:31 AM  
 AST (SGOT) 19 04/02/2018 08:31 AM  
 Alk. phosphatase 92 04/02/2018 08:31 AM  
 Protein, total 7.2 04/02/2018 08:31 AM  
 Albumin 4.8 04/02/2018 08:31 AM  
 Globulin 2.8 09/23/2009 01:22 PM  
 A-G Ratio 2.0 04/02/2018 08:31 AM  
  
Lab Results Component Value Date/Time Hemoglobin A1c 6.2 (H) 03/27/2013 08:49 AM  
 Hemoglobin A1c (POC) 5.8 04/02/2018 08:30 AM  
 Hemoglobin A1c, External 6.2 06/26/2015 Review of Systems Constitutional: Negative for malaise/fatigue. HENT: Negative for congestion. Eyes: Negative for blurred vision. Respiratory: Negative for cough and shortness of breath. Cardiovascular: Negative for chest pain, palpitations and leg swelling. Gastrointestinal: Negative for abdominal pain, constipation and heartburn. Genitourinary: Negative for dysuria, frequency and urgency. Musculoskeletal: Negative for back pain and joint pain. Neurological: Negative for dizziness, tingling and headaches. Endo/Heme/Allergies: Negative for environmental allergies. Psychiatric/Behavioral: Negative for depression. The patient does not have insomnia. Physical Exam  
Constitutional: She appears well-developed and well-nourished. /60 (BP 1 Location: Left arm, BP Patient Position: Sitting)  Pulse 66  Temp 96.7 °F (35.9 °C) (Oral)   Resp 18  Ht 5' 1\" (1.549 m)  Wt 163 lb (73.9 kg)  SpO2 97%  BMI 30.8 kg/m2 HENT:  
Right Ear: Tympanic membrane and ear canal normal.  
Left Ear: Tympanic membrane and ear canal normal.  
Nose: No mucosal edema or rhinorrhea. Mouth/Throat: Oropharynx is clear and moist and mucous membranes are normal.  
Neck: Normal range of motion. Neck supple. No thyromegaly present. Cardiovascular: Normal rate, regular rhythm and normal heart sounds. Exam reveals no gallop. Pulmonary/Chest: Effort normal and breath sounds normal.  
Abdominal: Soft. Normal appearance and bowel sounds are normal. She exhibits no mass. There is no tenderness. Musculoskeletal: Normal range of motion. She exhibits no edema. Lymphadenopathy:  
  She has no cervical adenopathy. Skin: Skin is warm and dry. Psychiatric: She has a normal mood and affect. Nursing note and vitals reviewed. ASSESSMENT and PLAN Diagnoses and all orders for this visit: 
 
1. Hypertriglyceridemia -     Refill gemfibrozil (LOPID) 600 mg tablet; Take 1 Tab by mouth two (2) times a day. 2. IGT (impaired glucose tolerance) Continue to monitor. Work on diet and exercise. 3. Vitamin D deficiency As per endo 4. Primary osteoarthritis involving multiple joints Stable 5. Anxiety -     Refill LORazepam (ATIVAN) 1 mg tablet; Take 0.5-1 Tabs by mouth every eight (8) hours as needed for Anxiety. 6. Gastroesophageal reflux disease without esophagitis -     Refill pantoprazole (PROTONIX) 40 mg tablet; Take 1 Tab by mouth daily.  
Anti-reflux measures such as raising the head of the bed, avoiding tight clothing or belts, avoiding eating late at night and not lying down shortly after mealtime and achieving weight loss are discussed. Avoid ASA, NSAID's, caffeine, peppermints, chocolate and other food triggers, alcohol and tobacco.  
 
7. Encounter for medication monitoring 8. Encounter for immunization -     Influenza Vaccine Inactivated (IIV)(FLUAD), Subunit, Adjuvanted, IM, (98023) -     OK IMMUNIZ ADMIN,1 SINGLE/COMB VAC/TOXOID Follow-up Disposition: 
Return in about 6 months (around 4/2/2019). reviewed diet, exercise and weight control 
cardiovascular risk and specific lipid/LDL goals reviewed 
reviewed medications and side effects in detail 
specific diabetic recommendations: low cholesterol diet, weight control and daily exercise discussed and glycohemoglobin and other lab monitoring discussed I have discussed diagnosis listed in this note with pt and/or family. I have discussed treatment plans and options and the risk/benefit analysis of those options, including safe use of medications and possible medication side effects. Through the use of shared decision making we have agreed to the above plan. The patient has received an after-visit summary and questions were answered concerning future plans and follow up. Advise pt of any urgent changes then to proceed to the ER.

## 2019-04-02 ENCOUNTER — OFFICE VISIT (OUTPATIENT)
Dept: FAMILY MEDICINE CLINIC | Age: 79
End: 2019-04-02

## 2019-04-02 ENCOUNTER — HOSPITAL ENCOUNTER (OUTPATIENT)
Dept: LAB | Age: 79
Discharge: HOME | End: 2019-04-02
Payer: MEDICARE

## 2019-04-02 VITALS
RESPIRATION RATE: 16 BRPM | DIASTOLIC BLOOD PRESSURE: 58 MMHG | OXYGEN SATURATION: 97 % | BODY MASS INDEX: 29.49 KG/M2 | HEART RATE: 68 BPM | HEIGHT: 61 IN | WEIGHT: 156.2 LBS | TEMPERATURE: 97 F | SYSTOLIC BLOOD PRESSURE: 117 MMHG

## 2019-04-02 DIAGNOSIS — M15.9 PRIMARY OSTEOARTHRITIS INVOLVING MULTIPLE JOINTS: ICD-10-CM

## 2019-04-02 DIAGNOSIS — K21.9 GASTROESOPHAGEAL REFLUX DISEASE WITHOUT ESOPHAGITIS: ICD-10-CM

## 2019-04-02 DIAGNOSIS — R73.02 IGT (IMPAIRED GLUCOSE TOLERANCE): ICD-10-CM

## 2019-04-02 DIAGNOSIS — F41.9 ANXIETY: ICD-10-CM

## 2019-04-02 DIAGNOSIS — E78.1 HYPERTRIGLYCERIDEMIA: Primary | ICD-10-CM

## 2019-04-02 DIAGNOSIS — Z51.81 ENCOUNTER FOR MEDICATION MONITORING: ICD-10-CM

## 2019-04-02 DIAGNOSIS — E55.9 VITAMIN D DEFICIENCY: ICD-10-CM

## 2019-04-02 LAB
GLUCOSE POC: 86 MG/DL
HBA1C MFR BLD HPLC: 5.9 %

## 2019-04-02 PROCEDURE — 85027 COMPLETE CBC AUTOMATED: CPT

## 2019-04-02 PROCEDURE — 82306 VITAMIN D 25 HYDROXY: CPT

## 2019-04-02 PROCEDURE — 80061 LIPID PANEL: CPT

## 2019-04-02 PROCEDURE — 80053 COMPREHEN METABOLIC PANEL: CPT

## 2019-04-02 RX ORDER — LORAZEPAM 1 MG/1
.5-1 TABLET ORAL
Qty: 90 TAB | Refills: 1 | Status: SHIPPED | OUTPATIENT
Start: 2019-04-02 | End: 2019-10-02 | Stop reason: SDUPTHER

## 2019-04-02 NOTE — PROGRESS NOTES
Chief Complaint Patient presents with  Cholesterol Problem  
  follow up  Blood sugar problem  
  follow up IGT Mammogram 10/1/2018 Bone density 10/10/2016 Colonoscopy 12/14/2016 by Dr. Philippe Code- repeat in 5 years Eye exam 5/7/2018 by dr. Jazmyne Prater. Patient states she has an appt in 5/2019 1. Have you been to the ER, urgent care clinic since your last visit? Hospitalized since your last visit? No 
 
2. Have you seen or consulted any other health care providers outside of the [address] [place] [address] since your last visit? Include any pap smears or colon screening.  No

## 2019-04-02 NOTE — PROGRESS NOTES
HISTORY OF PRESENT ILLNESS Elise Hewitt is a 69 y.o. female. HPI Follow up on chronic medical problems. Doing well. Hypertriglyceridemia follow up: 
Compliant w/ meds, low fat, low cholesterol diet. She is still taking lopid for the the triglycerides. Exercising some. No muscle nor abdominal pain, no skin discoloration. Osteoarthritis: 
Patient has osteoarthritis. Has various joint aches but overall doing well. Symptoms onset: problem is longstanding. Rheumatological ROS: stable, mild-to-moderate joint symptoms intermittently, reasonably well controlled by PRN meds. Response to treatment plan: stable and intermittent. Glucose intolerance reveiw: She has IGT. Diabetic ROS - further diabetic ROS: no polyuria or polydipsia, no chest pain, dyspnea or TIA's, no numbness, tingling or pain in extremities, no unusual visual symptoms. Lab review: orders written for new lab studies as appropriate; see orders. Patient Active Problem List  
Diagnosis Code  Hypertriglyceridemia E78.1  GERD (gastroesophageal reflux disease) K21.9  
 OA (osteoarthritis) M19.90  Vitamin D deficiency E55.9  Anxiety F41.9  Environmental allergies Z91.09  
 Encounter for medication monitoring Z51.81  
 IGT (impaired glucose tolerance) R73.02  
 
 
Current Outpatient Medications Medication Sig Dispense Refill  pantoprazole (PROTONIX) 40 mg tablet Take 1 Tab by mouth daily. 90 Tab 3  
 gemfibrozil (LOPID) 600 mg tablet Take 1 Tab by mouth two (2) times a day. 180 Tab 3  
 LORazepam (ATIVAN) 1 mg tablet Take 0.5-1 Tabs by mouth every eight (8) hours as needed for Anxiety. 90 Tab 1  promethazine-codeine (PHENERGAN WITH CODEINE) 6.25-10 mg/5 mL syrup Take 5 mL by mouth every six (6) hours as needed for Cough. Max Daily Amount: 20 mL. 240 mL 1  ibuprofen (MOTRIN) 200 mg tablet Take 200 mg by mouth every six (6) hours as needed for Pain.  aspirin 81 mg chewable tablet Take 81 mg by mouth daily.  cetirizine (ZYRTEC) 10 mg tablet Take 10 mg by mouth daily.  Cholecalciferol, Vitamin D3, (VITAMIN D3) 1,000 unit Cap Take 1 Cap by mouth daily. Allergies Allergen Reactions  Macrobid [Nitrofurantoin Monohyd/M-Cryst] Rash  Pcn [Penicillins] Hives Past Medical History:  
Diagnosis Date  GERD (gastroesophageal reflux disease) 2/23/2010  Hypertriglyceridemia 2/23/2010  Hypovitaminosis D   
 OA (osteoarthritis) 2/23/2010  Osteoarthritis  Vitamin D deficiency 2/23/2010 Past Surgical History:  
Procedure Laterality Date [address]  HX CATARACT REMOVAL  06/2018  
 right eye  HX HEENT  12/08  
 cervical neck stenosis s/p cervical release  HX HERNIA REPAIR  7211  
 umbilical  
 HX HIP REPLACEMENT  6/2007  
 right  HX HIP REPLACEMENT  12/2007  
 left  HX ORTHOPAEDIC  12/2008  
 cervical release  [place] COLONOSCOPY FLX DX W/COLLJ SPEC WHEN PFRMD  06/2006  [place] COLONOSCOPY FLX DX W/COLLJ SPEC WHEN PFRMD  08/29/2011 Dr. Adele Botello Family History Problem Relation Age of Onset  Hypertension Mother  Heart Failure Mother  Cancer Father   
     colon  Lung Disease Brother  Arthritis-rheumatoid Brother  Heart Disease Brother Social History Tobacco Use  Smoking status: Current Every Day Smoker Packs/day: 0.25 Years: 55.00 Pack years: 13.75  Smokeless tobacco: Never Used Substance Use Topics  Alcohol use: Yes Alcohol/week: 0.0 oz  
  Comment: 1 drink a month Lab Results Component Value Date/Time WBC 6.9 04/02/2018 08:31 AM  
 HGB 13.4 04/02/2018 08:31 AM  
 HCT 41.3 04/02/2018 08:31 AM  
 PLATELET 232 (H) 07/24/5852 08:31 AM  
 MCV 89 04/02/2018 08:31 AM  
 
Lab Results Component Value Date/Time  Cholesterol, total 208 (H) 04/02/2018 08:31 AM  
 HDL Cholesterol 60 04/02/2018 08:31 AM  
 LDL, calculated 123 (H) 04/02/2018 08:31 AM  
 LDL-C, External 134 06/26/2015 07:42 AM  
 Triglyceride 126 04/02/2018 08:31 AM  
 CHOL/HDL Ratio 3.3 09/22/2010 10:02 AM  
 
Lab Results Component Value Date/Time TSH 0.13 (L) 09/23/2009 01:22 PM  
  
Lab Results Component Value Date/Time Sodium 141 04/02/2018 08:31 AM  
 Potassium 5.0 04/02/2018 08:31 AM  
 Chloride 102 04/02/2018 08:31 AM  
 CO2 20 04/02/2018 08:31 AM  
 Anion gap 10 09/23/2009 01:22 PM  
 Glucose 86 04/02/2018 08:31 AM  
 BUN 20 04/02/2018 08:31 AM  
 Creatinine 1.10 (H) 04/02/2018 08:31 AM  
 BUN/Creatinine ratio 18 04/02/2018 08:31 AM  
 GFR est AA 56 (L) 04/02/2018 08:31 AM  
 GFR est non-AA 49 (L) 04/02/2018 08:31 AM  
 Calcium 10.6 (H) 04/02/2018 08:31 AM  
 Bilirubin, total 0.2 04/02/2018 08:31 AM  
 ALT (SGPT) 10 04/02/2018 08:31 AM  
 AST (SGOT) 19 04/02/2018 08:31 AM  
 Alk. phosphatase 92 04/02/2018 08:31 AM  
 Protein, total 7.2 04/02/2018 08:31 AM  
 Albumin 4.8 04/02/2018 08:31 AM  
 Globulin 2.8 09/23/2009 01:22 PM  
 A-G Ratio 2.0 04/02/2018 08:31 AM  
  
Lab Results Component Value Date/Time Hemoglobin A1c 6.2 (H) 03/27/2013 08:49 AM  
 Hemoglobin A1c (POC) 5.8 04/02/2018 08:30 AM  
 Hemoglobin A1c, External 6.2 06/26/2015 Review of Systems Constitutional: Negative for malaise/fatigue. HENT: Negative for congestion. Eyes: Negative for blurred vision. Respiratory: Negative for cough and shortness of breath. Cardiovascular: Negative for chest pain, palpitations and leg swelling. Gastrointestinal: Negative for abdominal pain, constipation and heartburn. Genitourinary: Negative for dysuria, frequency and urgency. Musculoskeletal: Negative for back pain and joint pain. Neurological: Negative for dizziness, tingling and headaches. Endo/Heme/Allergies: Negative for environmental allergies. Psychiatric/Behavioral: Negative for depression. The patient does not have insomnia. Physical Exam  
Constitutional: She appears well-developed and well-nourished. /58 (BP 1 Location: Left arm, BP Patient Position: Sitting)   Pulse 68   Temp 97 °F (36.1 °C) (Oral)   Resp 16   Ht 5' 1\" (1.549 m)   Wt 156 lb 3.2 oz (70.9 kg)   SpO2 97%   BMI 29.51 kg/m² HENT:  
Right Ear: Tympanic membrane and ear canal normal.  
Left Ear: Tympanic membrane and ear canal normal.  
Nose: No mucosal edema or rhinorrhea. Mouth/Throat: Oropharynx is clear and moist and mucous membranes are normal.  
Neck: Normal range of motion. Neck supple. No thyromegaly present. Cardiovascular: Normal rate, regular rhythm and normal heart sounds. Exam reveals no gallop. Pulmonary/Chest: Effort normal and breath sounds normal.  
Abdominal: Soft. Normal appearance and bowel sounds are normal. She exhibits no mass. There is no tenderness. Musculoskeletal: Normal range of motion. She exhibits no edema. Lymphadenopathy:  
  She has no cervical adenopathy. Skin: Skin is warm and dry. Psychiatric: She has a normal mood and affect. Nursing note and vitals reviewed. ASSESSMENT and PLAN Diagnoses and all orders for this visit: 
 
1. Hypertriglyceridemia -     LIPID PANEL 
 
2. IGT (impaired glucose tolerance) -     AMB POC HEMOGLOBIN A1C 
-     AMB POC GLUCOSE, QUANTITATIVE, BLOOD 3. Vitamin D deficiency 
-     VITAMIN D, 25 HYDROXY 4. Primary osteoarthritis involving multiple joints Stable 5. Anxiety -     Refill LORazepam (ATIVAN) 1 mg tablet; Take 0.5-1 Tabs by mouth every eight (8) hours as needed for Anxiety. 6. Gastroesophageal reflux disease without esophagitis Stable on protonix. 7. Encounter for medication monitoring -     METABOLIC PANEL, COMPREHENSIVE 
-     CBC W/O DIFF Follow-up and Dispositions · Return in about 6 months (around 10/2/2019) for medicare wellness exam. 
  
 
reviewed diet, exercise and weight control cardiovascular risk and specific lipid/LDL goals reviewed 
reviewed medications and side effects in detail 
specific diabetic recommendations: low cholesterol diet, weight control and daily exercise discussed and glycohemoglobin and other lab monitoring discussed I have discussed diagnosis listed in this note with pt and/or family. I have discussed treatment plans and options and the risk/benefit analysis of those options, including safe use of medications and possible medication side effects. Through the use of shared decision making we have agreed to the above plan. The patient has received an after-visit summary and questions were answered concerning future plans and follow up. Advise pt of any urgent changes then to proceed to the ER.

## 2019-04-03 LAB
25(OH)D3+25(OH)D2 SERPL-MCNC: 44.1 NG/ML (ref 30–100)
ALBUMIN SERPL-MCNC: 4.6 G/DL (ref 3.5–4.8)
ALBUMIN/GLOB SERPL: 1.8 {RATIO} (ref 1.2–2.2)
ALP SERPL-CCNC: 147 IU/L (ref 39–117)
ALT SERPL-CCNC: 11 IU/L (ref 0–32)
AST SERPL-CCNC: 16 IU/L (ref 0–40)
BILIRUB SERPL-MCNC: 0.3 MG/DL (ref 0–1.2)
BUN SERPL-MCNC: 20 MG/DL (ref 8–27)
BUN/CREAT SERPL: 16 (ref 12–28)
CALCIUM SERPL-MCNC: 10.4 MG/DL (ref 8.7–10.3)
CHLORIDE SERPL-SCNC: 103 MMOL/L (ref 96–106)
CHOLEST SERPL-MCNC: 191 MG/DL (ref 100–199)
CO2 SERPL-SCNC: 20 MMOL/L (ref 20–29)
CREAT SERPL-MCNC: 1.22 MG/DL (ref 0.57–1)
ERYTHROCYTE [DISTWIDTH] IN BLOOD BY AUTOMATED COUNT: 14.3 % (ref 12.3–15.4)
GLOBULIN SER CALC-MCNC: 2.5 G/DL (ref 1.5–4.5)
GLUCOSE SERPL-MCNC: 83 MG/DL (ref 65–99)
HCT VFR BLD AUTO: 39.1 % (ref 34–46.6)
HDLC SERPL-MCNC: 48 MG/DL
HGB BLD-MCNC: 12.1 G/DL (ref 11.1–15.9)
INTERPRETATION, 910389: NORMAL
INTERPRETATION: NORMAL
LDLC SERPL CALC-MCNC: 118 MG/DL (ref 0–99)
MCH RBC QN AUTO: 28.1 PG (ref 26.6–33)
MCHC RBC AUTO-ENTMCNC: 30.9 G/DL (ref 31.5–35.7)
MCV RBC AUTO: 91 FL (ref 79–97)
PDF IMAGE, 910387: NORMAL
PLATELET # BLD AUTO: 448 X10E3/UL (ref 150–379)
POTASSIUM SERPL-SCNC: 4.5 MMOL/L (ref 3.5–5.2)
PROT SERPL-MCNC: 7.1 G/DL (ref 6–8.5)
RBC # BLD AUTO: 4.3 X10E6/UL (ref 3.77–5.28)
SODIUM SERPL-SCNC: 141 MMOL/L (ref 134–144)
TRIGL SERPL-MCNC: 126 MG/DL (ref 0–149)
VLDLC SERPL CALC-MCNC: 25 MG/DL (ref 5–40)
WBC # BLD AUTO: 6.6 X10E3/UL (ref 3.4–10.8)

## 2019-10-02 ENCOUNTER — HOSPITAL ENCOUNTER (OUTPATIENT)
Dept: LAB | Age: 79
Discharge: HOME | End: 2019-10-02
Payer: MEDICARE

## 2019-10-02 ENCOUNTER — OFFICE VISIT (OUTPATIENT)
Dept: FAMILY MEDICINE CLINIC | Age: 79
End: 2019-10-02

## 2019-10-02 VITALS
TEMPERATURE: 97.2 F | WEIGHT: 161.2 LBS | HEART RATE: 68 BPM | DIASTOLIC BLOOD PRESSURE: 67 MMHG | RESPIRATION RATE: 18 BRPM | HEIGHT: 61 IN | OXYGEN SATURATION: 98 % | SYSTOLIC BLOOD PRESSURE: 136 MMHG | BODY MASS INDEX: 30.43 KG/M2

## 2019-10-02 DIAGNOSIS — Z12.11 ENCOUNTER FOR SCREENING FECAL OCCULT BLOOD TESTING: ICD-10-CM

## 2019-10-02 DIAGNOSIS — E55.9 VITAMIN D DEFICIENCY: ICD-10-CM

## 2019-10-02 DIAGNOSIS — F41.9 ANXIETY: ICD-10-CM

## 2019-10-02 DIAGNOSIS — Z51.81 ENCOUNTER FOR MEDICATION MONITORING: ICD-10-CM

## 2019-10-02 DIAGNOSIS — E78.1 HYPERTRIGLYCERIDEMIA: ICD-10-CM

## 2019-10-02 DIAGNOSIS — Z00.00 MEDICARE ANNUAL WELLNESS VISIT, SUBSEQUENT: Primary | ICD-10-CM

## 2019-10-02 DIAGNOSIS — Z23 ENCOUNTER FOR IMMUNIZATION: ICD-10-CM

## 2019-10-02 DIAGNOSIS — R55 SYNCOPE, UNSPECIFIED SYNCOPE TYPE: ICD-10-CM
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
## 2019-10-02 DIAGNOSIS — M15.9 PRIMARY OSTEOARTHRITIS INVOLVING MULTIPLE JOINTS: ICD-10-CM
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
## 2019-10-02 DIAGNOSIS — R73.02 IGT (IMPAIRED GLUCOSE TOLERANCE): ICD-10-CM

## 2019-10-02 DIAGNOSIS — Z79.82 LONG TERM (CURRENT) USE OF ASPIRIN: ICD-10-CM

## 2019-10-02 DIAGNOSIS — R42 DIZZINESS: ICD-10-CM

## 2019-10-02 DIAGNOSIS — K21.9 GASTROESOPHAGEAL REFLUX DISEASE WITHOUT ESOPHAGITIS: ICD-10-CM

## 2019-10-02 LAB
BILIRUB UR QL STRIP: NEGATIVE
GLUCOSE UR-MCNC: NEGATIVE MG/DL
HBA1C MFR BLD HPLC: 5.8 %
KETONES P FAST UR STRIP-MCNC: NEGATIVE MG/DL
PH UR STRIP: 5 [PH] (ref 4.6–8)
PROT UR QL STRIP: NEGATIVE
SP GR UR STRIP: 1.01 (ref 1–1.03)
UA UROBILINOGEN AMB POC: NORMAL (ref 0.2–1)
URINALYSIS CLARITY POC: CLEAR
URINALYSIS COLOR POC: YELLOW
URINE BLOOD POC: NEGATIVE
URINE LEUKOCYTES POC: NORMAL
URINE NITRITES POC: NEGATIVE

## 2019-10-02 PROCEDURE — 80053 COMPREHEN METABOLIC PANEL: CPT

## 2019-10-02 PROCEDURE — 80061 LIPID PANEL: CPT

## 2019-10-02 PROCEDURE — 36415 COLL VENOUS BLD VENIPUNCTURE: CPT

## 2019-10-02 PROCEDURE — 82306 VITAMIN D 25 HYDROXY: CPT

## 2019-10-02 PROCEDURE — 85027 COMPLETE CBC AUTOMATED: CPT

## 2019-10-02 PROCEDURE — 84443 ASSAY THYROID STIM HORMONE: CPT

## 2019-10-02 PROCEDURE — 84439 ASSAY OF FREE THYROXINE: CPT

## 2019-10-02 RX ORDER — LORAZEPAM 1 MG/1
.5-1 TABLET ORAL
Qty: 90 TAB | Refills: 1 | Status: SHIPPED | OUTPATIENT
Start: 2019-10-02 | End: 2020-06-18 | Stop reason: SDUPTHER

## 2019-10-02 RX ORDER — PANTOPRAZOLE SODIUM 40 MG/1
40 TABLET, DELAYED RELEASE ORAL DAILY
Qty: 90 TAB | Refills: 3 | Status: SHIPPED | OUTPATIENT
Start: 2019-10-02 | End: 2021-02-17 | Stop reason: SDUPTHER

## 2019-10-02 RX ORDER — GEMFIBROZIL 600 MG/1
600 TABLET, FILM COATED ORAL 2 TIMES DAILY
Qty: 180 TAB | Refills: 3 | Status: SHIPPED | OUTPATIENT
Start: 2019-10-02 | End: 2019-12-30 | Stop reason: ALTCHOICE

## 2019-10-02 NOTE — PROGRESS NOTES
Order placed for flu Shot per Verbal Order from Dr. Zelalem Zafar on 10/2/2019 due to need    Flu shot 0.5 ml given in left arm IM, no reaction noted.      Orthostatic B/P    Laying 150/55    P 84  Sitting  132/55   P 78  Laying  130/68  P 69          Scheduled Cardiac Event Monitor ( 30 day ) for October 7 th 9:30 am  [address]  [address] [phone]    Appointment with Dr. Kennedy Hart on October 31 st @ 2:30 pm

## 2019-10-02 NOTE — PROGRESS NOTES
This is a Subsequent Medicare Annual Wellness Exam (AWV) (Performed 12 months after IPPE or effective date of Medicare Part B enrollment)    I have reviewed the patient's medical history in detail and updated the computerized patient record. History     Patient Active Problem List   Diagnosis Code    Hypertriglyceridemia E78.1    GERD (gastroesophageal reflux disease) K21.9    OA (osteoarthritis) M19.90    Vitamin D deficiency E55.9    Anxiety F41.9    Environmental allergies Z91.09    Encounter for medication monitoring Z51.81    IGT (impaired glucose tolerance) R73.02       Current Outpatient Medications   Medication Sig Dispense Refill    LORazepam (ATIVAN) 1 mg tablet Take 0.5-1 Tabs by mouth every eight (8) hours as needed for Anxiety. 90 Tab 1    pantoprazole (PROTONIX) 40 mg tablet Take 1 Tab by mouth daily. 90 Tab 3    gemfibrozil (LOPID) 600 mg tablet Take 1 Tab by mouth two (2) times a day. 180 Tab 3    promethazine-codeine (PHENERGAN WITH CODEINE) 6.25-10 mg/5 mL syrup Take 5 mL by mouth every six (6) hours as needed for Cough. Max Daily Amount: 20 mL. 240 mL 1    ibuprofen (MOTRIN) 200 mg tablet Take 200 mg by mouth every six (6) hours as needed for Pain.  aspirin 81 mg chewable tablet Take 81 mg by mouth daily.  cetirizine (ZYRTEC) 10 mg tablet Take 10 mg by mouth daily.  Cholecalciferol, Vitamin D3, (VITAMIN D3) 1,000 unit Cap Take 1 Cap by mouth daily.            Allergies   Allergen Reactions    Macrobid [Nitrofurantoin Monohyd/M-Cryst] Rash    Pcn [Penicillins] Hives       Past Medical History:   Diagnosis Date    GERD (gastroesophageal reflux disease) 2/23/2010    Hypertriglyceridemia 2/23/2010    Hypovitaminosis D     OA (osteoarthritis) 2/23/2010    Osteoarthritis     Vitamin D deficiency 2/23/2010       Past Surgical History:   Procedure Laterality Date    HX ACL RECONSTRUCTION  1998    HX CATARACT REMOVAL  06/2018    right eye    HX HEENT  12/08 cervical neck stenosis s/p cervical release    HX HERNIA REPAIR  1961    umbilical    HX HIP REPLACEMENT  6/2007    right    HX HIP REPLACEMENT  12/2007    left    HX ORTHOPAEDIC  12/2008    cervical release    [place] COLONOSCOPY FLX DX W/COLLJ SPEC WHEN PFRMD  06/2006    [place] COLONOSCOPY FLX DX W/COLLJ SPEC WHEN PFRMD  08/29/2011    Dr. Ladonna Don       Family History   Problem Relation Age of Onset    Hypertension Mother     Heart Failure Mother     Cancer Father         colon    Lung Disease Brother     Arthritis-rheumatoid Brother     Heart Disease Brother        Social History     Tobacco Use    Smoking status: Current Every Day Smoker     Packs/day: 0.25     Years: 55.00     Pack years: 13.75    Smokeless tobacco: Never Used   Substance Use Topics    Alcohol use: Yes     Alcohol/week: 0.0 standard drinks     Comment: 1 drink a month            Depression Risk Factor Screening:     PHQ over the last two weeks 11/7/2017   Little interest or pleasure in doing things Not at all   Feeling down, depressed or hopeless Not at all   Total Score PHQ 2 0     Alcohol Risk Factor Screening: You do not drink alcohol or very rarely. Functional Ability and Level of Safety:   Hearing Loss  Hearing is good. Activities of Daily Living  The home contains: no safety equipment. Patient does total self care    Fall RiskFall Risk Assessment, last 12 mths 11/7/2017   Able to walk? Yes   Fall in past 12 months? No     Functional Ability:   Does the patient exhibit a steady gait? yes    How long did it take the patient to get up and walk from a sitting position? seconds    Is the patient self reliant? (ie can do own laundry, meals, household chores)  yes   Does the patient handle his/her own medications? yes   Does the patient handle his/her own money? yes   Is the patients home safe (ie good lighting, handrails on stairs and bath, etc.)? yes   Did you notice or did patient express any hearing difficulties?   no   Did you notice or did patient express any vision difficulties? no        Advance Care Planning:   Patient was offered the opportunity to discuss advance care planning:  yes    Does patient have an Advance Directive:  no   If no, did you provide information on Caring Connections? yes      Abuse Screen  Patient is not abused    Cognitive Screening   Evaluation of Cognitive Function:  Has your family/caregiver stated any concerns about your memory: no      Patient Care Team   Patient Care Team:  Darvin Yoder MD as PCP - General    Assessment/Plan   Education and counseling provided:  Are appropriate based on today's review and evaluation  End-of-Life planning (with patient's consent)    ASSESSMENT and PLAN    Medicare Annual Wellness  Continue current treatment plan. Continue annual follow up. I have discussed diagnosis listed in this note with pt and/or family. I have discussed treatment plans and options and the risk/benefit analysis of those options, including safe use of medications and possible medication side effects. Through the use of shared decision making we have agreed to the above plan. The patient has received an after-visit summary and questions were answered concerning future plans and follow up. Advise pt of any urgent changes then to proceed to the ER.

## 2019-10-02 NOTE — PROGRESS NOTES
HISTORY OF PRESENT ILLNESS  Bryn Diehl is a 67 y.o. female. HPI   Follow up on chronic medical problems. Had several c/os noted today. Has had 2 falls but knows it was from \"hurrying around too fast\". First fall was when the foot got caught in the edge of the door and second food was tripping over the curb. She hit her head on the second fall. Was seen at the ER with [facility] and CT was negative. However she does admits that she has had lightheadedness for the past 15-20 years. She has had a couple of episode of severe dizziness and then had vomiting and thinks that she may have passed out. She woke up to find herself on the floor on 2 occasions over the last few months though she was nonspecific about how long ago this actually occurred. \"She thinks 2 months ago. \". She did not go to the ER at the time to get evaluation \"zackary she felt fine afterwards\". She denies any chest pain, SOB or palpitations. Says she had similar episodes about 25 years ago and had cardiac eval that \"turned up nothing\". Hypertriglyceridemia follow up:  Compliant w/ meds, low fat, low cholesterol diet. She is still taking lopid for the the triglycerides. Exercising some. No muscle nor abdominal pain, no skin discoloration. Osteoarthritis:  Patient has osteoarthritis. Has various joint aches but overall doing well. Symptoms onset: problem is longstanding. Rheumatological ROS: stable, mild-to-moderate joint symptoms intermittently, reasonably well controlled by PRN meds. Response to treatment plan: stable and intermittent. Glucose intolerance reveiw:  She has IGT. Diabetic ROS - further diabetic ROS: no polyuria or polydipsia, no chest pain, dyspnea or TIA's, no numbness, tingling or pain in extremities, no unusual visual symptoms. Lab review: orders written for new lab studies as appropriate; see orders.      Patient Active Problem List   Diagnosis Code    Hypertriglyceridemia E78.1    GERD (gastroesophageal reflux disease) K21.9    OA (osteoarthritis) M19.90    Vitamin D deficiency E55.9    Anxiety F41.9    Environmental allergies Z91.09    Encounter for medication monitoring Z51.81    IGT (impaired glucose tolerance) R73.02       Current Outpatient Medications   Medication Sig Dispense Refill    LORazepam (ATIVAN) 1 mg tablet Take 0.5-1 Tabs by mouth every eight (8) hours as needed for Anxiety. 90 Tab 1    pantoprazole (PROTONIX) 40 mg tablet Take 1 Tab by mouth daily. 90 Tab 3    gemfibrozil (LOPID) 600 mg tablet Take 1 Tab by mouth two (2) times a day. 180 Tab 3    promethazine-codeine (PHENERGAN WITH CODEINE) 6.25-10 mg/5 mL syrup Take 5 mL by mouth every six (6) hours as needed for Cough. Max Daily Amount: 20 mL. 240 mL 1    ibuprofen (MOTRIN) 200 mg tablet Take 200 mg by mouth every six (6) hours as needed for Pain.  aspirin 81 mg chewable tablet Take 81 mg by mouth daily.  cetirizine (ZYRTEC) 10 mg tablet Take 10 mg by mouth daily.  Cholecalciferol, Vitamin D3, (VITAMIN D3) 1,000 unit Cap Take 1 Cap by mouth daily.            Allergies   Allergen Reactions    Macrobid [Nitrofurantoin Monohyd/M-Cryst] Rash    Pcn [Penicillins] Hives       Past Medical History:   Diagnosis Date    GERD (gastroesophageal reflux disease) 2/23/2010    Hypertriglyceridemia 2/23/2010    Hypovitaminosis D     OA (osteoarthritis) 2/23/2010    Osteoarthritis     Vitamin D deficiency 2/23/2010       Past Surgical History:   Procedure Laterality Date    HX ACL RECONSTRUCTION  1998    HX CATARACT REMOVAL  06/2018    right eye    HX HEENT  12/08    cervical neck stenosis s/p cervical release    HX HERNIA REPAIR  5403    umbilical    HX HIP REPLACEMENT  6/2007    right    HX HIP REPLACEMENT  12/2007    left    HX ORTHOPAEDIC  12/2008    cervical release    [place] COLONOSCOPY FLX DX W/COLLJ SPEC WHEN PFRMD  06/2006    [place] COLONOSCOPY FLX DX W/COLLJ SPEC WHEN PFRMD  08/29/2011    Dr. Deandra Grubbs Family History   Problem Relation Age of Onset    Hypertension Mother     Heart Failure Mother     Cancer Father         colon    Lung Disease Brother     Arthritis-rheumatoid Brother     Heart Disease Brother        Social History     Tobacco Use    Smoking status: Current Every Day Smoker     Packs/day: 0.25     Years: 55.00     Pack years: 13.75    Smokeless tobacco: Never Used   Substance Use Topics    Alcohol use: Yes     Alcohol/week: 0.0 standard drinks     Comment: 1 drink a month        Lab Results   Component Value Date/Time    WBC 6.6 04/02/2019 08:43 AM    HGB 12.1 04/02/2019 08:43 AM    HCT 39.1 04/02/2019 08:43 AM    PLATELET 961 (H) 36/62/6518 08:43 AM    MCV 91 04/02/2019 08:43 AM     Lab Results   Component Value Date/Time    Cholesterol, total 191 04/02/2019 08:43 AM    HDL Cholesterol 48 04/02/2019 08:43 AM    LDL, calculated 118 (H) 04/02/2019 08:43 AM    LDL-C, External 134 06/26/2015 07:42 AM    Triglyceride 126 04/02/2019 08:43 AM    CHOL/HDL Ratio 3.3 09/22/2010 10:02 AM     Lab Results   Component Value Date/Time    TSH 0.13 (L) 09/23/2009 01:22 PM      Lab Results   Component Value Date/Time    Sodium 141 04/02/2019 08:43 AM    Potassium 4.5 04/02/2019 08:43 AM    Chloride 103 04/02/2019 08:43 AM    CO2 20 04/02/2019 08:43 AM    Anion gap 10 09/23/2009 01:22 PM    Glucose 83 04/02/2019 08:43 AM    BUN 20 04/02/2019 08:43 AM    Creatinine 1.22 (H) 04/02/2019 08:43 AM    BUN/Creatinine ratio 16 04/02/2019 08:43 AM    GFR est AA 49 (L) 04/02/2019 08:43 AM    GFR est non-AA 43 (L) 04/02/2019 08:43 AM    Calcium 10.4 (H) 04/02/2019 08:43 AM    Bilirubin, total 0.3 04/02/2019 08:43 AM    ALT (SGPT) 11 04/02/2019 08:43 AM    AST (SGOT) 16 04/02/2019 08:43 AM    Alk.  phosphatase 147 (H) 04/02/2019 08:43 AM    Protein, total 7.1 04/02/2019 08:43 AM    Albumin 4.6 04/02/2019 08:43 AM    Globulin 2.8 09/23/2009 01:22 PM    A-G Ratio 1.8 04/02/2019 08:43 AM      Lab Results   Component Value Date/Time    Hemoglobin A1c 6.2 (H) 03/27/2013 08:49 AM    Hemoglobin A1c (POC) 5.9 04/02/2019 08:43 AM    Hemoglobin A1c, External 6.2 06/26/2015         Review of Systems   Constitutional: Negative for malaise/fatigue. HENT: Negative for congestion. Eyes: Negative for blurred vision. Respiratory: Negative for cough and shortness of breath. Cardiovascular: Negative for chest pain, palpitations and leg swelling. Gastrointestinal: Negative for abdominal pain, constipation and heartburn. Genitourinary: Negative for dysuria, frequency and urgency. Musculoskeletal: Negative for back pain and joint pain. Neurological: Positive for dizziness. Negative for tingling and headaches. Endo/Heme/Allergies: Negative for environmental allergies. Psychiatric/Behavioral: Negative for depression. The patient does not have insomnia. Physical Exam   Constitutional: She appears well-developed and well-nourished. /67 (BP 1 Location: Left arm, BP Patient Position: Sitting)   Pulse 68   Temp 97.2 °F (36.2 °C) (Oral)   Resp 18   Ht 5' 1\" (1.549 m)   Wt 161 lb 3.2 oz (73.1 kg)   SpO2 98%   BMI 30.46 kg/m²    HENT:   Right Ear: Tympanic membrane and ear canal normal.   Left Ear: Tympanic membrane and ear canal normal.   Nose: No mucosal edema or rhinorrhea. Mouth/Throat: Oropharynx is clear and moist and mucous membranes are normal.   Neck: Normal range of motion. Neck supple. No thyromegaly present. Cardiovascular: Normal rate, regular rhythm and normal heart sounds. Exam reveals no gallop. Pulmonary/Chest: Effort normal and breath sounds normal.   Abdominal: Soft. Normal appearance and bowel sounds are normal. She exhibits no mass. There is no tenderness. Genitourinary: Rectal exam shows guaiac negative stool. Musculoskeletal: Normal range of motion. She exhibits no edema. Lymphadenopathy:     She has no cervical adenopathy.    Neurological:   Neurological Exam: alert, oriented, normal speech, no focal findings or movement disorder noted, screening mental status exam normal, neck supple without rigidity, cranial nerves II through XII intact, DTR's normal and symmetric, Babinski sign negative, motor and sensory grossly normal bilaterally, normal muscle tone, no tremors, strength 5/5, Romberg sign negative, normal gait and station. Skin: Skin is warm and dry. Psychiatric: She has a normal mood and affect. Her speech is rapid and/or pressured. Nursing note and vitals reviewed. ASSESSMENT and PLAN  Diagnoses and all orders for this visit:    1. Medicare annual wellness visit, subsequent  -     AMB POC URINALYSIS DIP STICK AUTO W/ MICRO    2. Hypertriglyceridemia  -     LIPID PANEL  -     gemfibrozil (LOPID) 600 mg tablet; Take 1 Tab by mouth two (2) times a day. 3. IGT (impaired glucose tolerance)  -     AMB POC HEMOGLOBIN A1C    4. Vitamin D deficiency  -     VITAMIN D, 25 HYDROXY    5. Primary osteoarthritis involving multiple joints  Stable     6. Anxiety  -     LORazepam (ATIVAN) 1 mg tablet; Take 0.5-1 Tabs by mouth every eight (8) hours as needed for Anxiety.  -     THYROID PANEL W/TSH  -     T4, FREE    7. Gastroesophageal reflux disease without esophagitis  -     Refill pantoprazole (PROTONIX) 40 mg tablet; Take 1 Tab by mouth daily. 8. Encounter for medication monitoring  -     METABOLIC PANEL, COMPREHENSIVE    9. Encounter for screening fecal occult blood testing  -     AMB POC FECAL BLOOD, OCCULT, QL 1 CARD    10. Long term (current) use of aspirin  -     AMB POC FECAL BLOOD, OCCULT, QL 1 CARD    11. Encounter for immunization  -     INFLUENZA VACCINE INACTIVATED (IIV), SUBUNIT, ADJUVANTED, IM  -     IA IMMUNIZ ADMIN,1 SINGLE/COMB VAC/TOXOID    12. Dizziness  -     REFERRAL TO CARDIOLOGY  -     CARDIAC EVENT MONITOR; Future  -     DUPLEX CAROTID BILATERAL; Future    13.  Syncope, unspecified syncope type  -     REFERRAL TO CARDIOLOGY  -     AMB POC EKG ROUTINE W/ 12 LEADS, [address]; Future  -     DUPLEX CAROTID BILATERAL; Future    14. Fall at home  Discussed fall prevention. Follow-up and Dispositions    · Return in about 3 months (around 1/2/2020). reviewed diet, exercise and weight control  cardiovascular risk and specific lipid/LDL goals reviewed  reviewed medications and side effects in detail  specific diabetic recommendations: low cholesterol diet, weight control and daily exercise discussed and glycohemoglobin and other lab monitoring discussed     I have discussed diagnosis listed in this note with pt and/or family. I have discussed treatment plans and options and the risk/benefit analysis of those options, including safe use of medications and possible medication side effects. Through the use of shared decision making we have agreed to the above plan. The patient has received an after-visit summary and questions were answered concerning future plans and follow up. Advise pt of any urgent changes then to proceed to the ER.

## 2019-10-02 NOTE — PROGRESS NOTES
Chief Complaint   Patient presents with    Annual Wellness Visit     Mammogram 10/1/2018    Bone density 10/10/2016    Colonoscopy 12/14/2016 by Dr. Reed Garcia- repeat in 5 years    Eye exam 8/19/2019 by Dr. Ann Giles. 1. Have you been to the ER, urgent care clinic since your last visit? Hospitalized since your last visit? Yes 5/2019 [facility] due to a fall    2. Have you seen or consulted any other health care providers outside of the [address] [place] [address] since your last visit? Include any pap smears or colon screening.  No

## 2019-10-03 LAB
25(OH)D3+25(OH)D2 SERPL-MCNC: 41.1 NG/ML (ref 30–100)
ALBUMIN SERPL-MCNC: 4.5 G/DL (ref 3.5–4.8)
ALBUMIN/GLOB SERPL: 2 {RATIO} (ref 1.2–2.2)
ALP SERPL-CCNC: 113 IU/L (ref 39–117)
ALT SERPL-CCNC: 11 IU/L (ref 0–32)
AST SERPL-CCNC: 17 IU/L (ref 0–40)
BILIRUB SERPL-MCNC: 0.4 MG/DL (ref 0–1.2)
BUN SERPL-MCNC: 16 MG/DL (ref 8–27)
BUN/CREAT SERPL: 15 (ref 12–28)
CALCIUM SERPL-MCNC: 10.3 MG/DL (ref 8.7–10.3)
CHLORIDE SERPL-SCNC: 104 MMOL/L (ref 96–106)
CHOLEST SERPL-MCNC: 219 MG/DL (ref 100–199)
CO2 SERPL-SCNC: 20 MMOL/L (ref 20–29)
CREAT SERPL-MCNC: 1.09 MG/DL (ref 0.57–1)
FT4I SERPL CALC-MCNC: 1.3 (ref 1.2–4.9)
GLOBULIN SER CALC-MCNC: 2.2 G/DL (ref 1.5–4.5)
GLUCOSE SERPL-MCNC: 86 MG/DL (ref 65–99)
HDLC SERPL-MCNC: 52 MG/DL
INTERPRETATION, 910389: NORMAL
INTERPRETATION: NORMAL
LDLC SERPL CALC-MCNC: 138 MG/DL (ref 0–99)
PDF IMAGE, 910387: NORMAL
POTASSIUM SERPL-SCNC: 4.7 MMOL/L (ref 3.5–5.2)
PROT SERPL-MCNC: 6.7 G/DL (ref 6–8.5)
SODIUM SERPL-SCNC: 142 MMOL/L (ref 134–144)
T3RU NFR SERPL: 20 % (ref 24–39)
T4 FREE SERPL-MCNC: 1.15 NG/DL (ref 0.82–1.77)
T4 SERPL-MCNC: 6.3 UG/DL (ref 4.5–12)
TRIGL SERPL-MCNC: 146 MG/DL (ref 0–149)
TSH SERPL DL<=0.005 MIU/L-ACNC: 0.2 UIU/ML (ref 0.45–4.5)
VLDLC SERPL CALC-MCNC: 29 MG/DL (ref 5–40)

## 2019-10-04 LAB
ERYTHROCYTE [DISTWIDTH] IN BLOOD BY AUTOMATED COUNT: 12.3 % (ref 12.3–15.4)
HCT VFR BLD AUTO: 39.3 % (ref 34–46.6)
HGB BLD-MCNC: 12.6 G/DL (ref 11.1–15.9)
MCH RBC QN AUTO: 28.7 PG (ref 26.6–33)
MCHC RBC AUTO-ENTMCNC: 32.1 G/DL (ref 31.5–35.7)
MCV RBC AUTO: 90 FL (ref 79–97)
PLATELET # BLD AUTO: 397 X10E3/UL (ref 150–450)
RBC # BLD AUTO: 4.39 X10E6/UL (ref 3.77–5.28)
WBC # BLD AUTO: 8.4 X10E3/UL (ref 3.4–10.8)

## 2019-10-07 ENCOUNTER — CLINICAL SUPPORT (OUTPATIENT)
Dept: CARDIOLOGY CLINIC | Age: 79
End: 2019-10-07

## 2019-10-07 DIAGNOSIS — R55 SYNCOPE AND COLLAPSE: Primary | ICD-10-CM

## 2019-10-07 NOTE — PROGRESS NOTES
Patient received a 30 day event monitor. Instructions given verbally as well as an instruction sheet. Pt verbalized understanding.     [facility] Event Monitoring

## 2019-10-08 ENCOUNTER — HOSPITAL ENCOUNTER (OUTPATIENT)
Dept: VASCULAR SURGERY | Age: 79
Discharge: HOME | End: 2019-10-08
Attending: FAMILY MEDICINE
Payer: MEDICARE

## 2019-10-08 DIAGNOSIS — R55 SYNCOPE, UNSPECIFIED SYNCOPE TYPE: ICD-10-CM

## 2019-10-08 DIAGNOSIS — R42 DIZZINESS: ICD-10-CM

## 2019-10-08 PROCEDURE — 93880 EXTRACRANIAL BILAT STUDY: CPT

## 2019-10-09 ENCOUNTER — DOCUMENTATION ONLY (OUTPATIENT)
Dept: FAMILY MEDICINE CLINIC | Age: 79
End: 2019-10-09

## 2019-10-10 ENCOUNTER — TELEPHONE (OUTPATIENT)
Dept: FAMILY MEDICINE CLINIC | Age: 79
End: 2019-10-10

## 2019-10-10 NOTE — TELEPHONE ENCOUNTER
Notified patient thyroid results were abnormal, to call endocrinologist and make an appointment , she agreed.     Labs were faxed to endo at [phone]

## 2019-10-10 NOTE — PROGRESS NOTES
Please send her results to her endo(Dr. Elizabeth Mckeon) that ordered the tests.   Please call pt and let her know that one of her thryoid numbers was abnomal and to follow back up with her endo that ordered the test.

## 2019-10-17 LAB
LEFT CCA DIST DIAS: 11.7 CM/S
LEFT CCA DIST SYS: 59.2 CM/S
LEFT CCA PROX DIAS: 16.8 CM/S
LEFT CCA PROX SYS: 84.9 CM/S
LEFT ECA DIAS: 0 CM/S
LEFT ECA SYS: 61.7 CM/S
LEFT ICA DIST DIAS: 14 CM/S
LEFT ICA DIST SYS: 71.6 CM/S
LEFT ICA MID DIAS: 31.9 CM/S
LEFT ICA MID SYS: 113 CM/S
LEFT ICA PROX DIAS: 16.9 CM/S
LEFT ICA PROX SYS: 74.6 CM/S
LEFT ICA/CCA SYS: 1.91
LEFT SUBCLAVIAN DIAS: 0 CM/S
LEFT SUBCLAVIAN SYS: 87 CM/S
LEFT VERTEBRAL DIAS: 20.08 CM/S
LEFT VERTEBRAL SYS: 51.9 CM/S
RIGHT CCA DIST DIAS: 19.5 CM/S
RIGHT CCA DIST SYS: 80.6 CM/S
RIGHT CCA PROX DIAS: 15.6 CM/S
RIGHT CCA PROX SYS: 86.5 CM/S
RIGHT ECA DIAS: 5.4 CM/S
RIGHT ECA SYS: 60.3 CM/S
RIGHT ICA DIST DIAS: 20.6 CM/S
RIGHT ICA DIST SYS: 65.9 CM/S
RIGHT ICA MID DIAS: 12.3 CM/S
RIGHT ICA MID SYS: 47.7 CM/S
RIGHT ICA PROX DIAS: 10 CM/S
RIGHT ICA PROX SYS: 39.2 CM/S
RIGHT ICA/CCA SYS: 0.8
RIGHT SUBCLAVIAN DIAS: 0 CM/S
RIGHT SUBCLAVIAN SYS: 82.4 CM/S
RIGHT VERTEBRAL DIAS: 13.42 CM/S
RIGHT VERTEBRAL SYS: 47.3 CM/S

## 2019-10-31 ENCOUNTER — OFFICE VISIT (OUTPATIENT)
Dept: CARDIOLOGY CLINIC | Age: 79
End: 2019-10-31

## 2019-10-31 VITALS
RESPIRATION RATE: 16 BRPM | SYSTOLIC BLOOD PRESSURE: 144 MMHG | HEIGHT: 61 IN | DIASTOLIC BLOOD PRESSURE: 80 MMHG | HEART RATE: 73 BPM | WEIGHT: 163.2 LBS | OXYGEN SATURATION: 94 % | BODY MASS INDEX: 30.81 KG/M2

## 2019-10-31 DIAGNOSIS — E78.1 HYPERTRIGLYCERIDEMIA: ICD-10-CM

## 2019-10-31 DIAGNOSIS — I44.7 LBBB (LEFT BUNDLE BRANCH BLOCK): ICD-10-CM

## 2019-10-31 DIAGNOSIS — R55 SYNCOPE, UNSPECIFIED SYNCOPE TYPE: Primary | ICD-10-CM

## 2019-10-31 NOTE — PROGRESS NOTES
Omaira Ashford, [facility]    Subjective/HPI:     Ms. April Portillo is a 67 y.o. female is here to establish care. She has a PMHx of HLD, hyperthyroidism, GERD and light-headedness. She was referred for further evaluation of abnormal EKG, with complaints of light-headedness and near syncope. She reports light-headedness symptoms that have been ongoing for 15-20 years. She reports an episode of syncope once every 15-20 years. Most recent episode was 2 months ago, however she did not lose consciousness then. She describes previous symptoms as a sudden onset of light-headedness and dizziness, with the room spinning, associated with nausea and then syncopal episode, out for many hours at a time. She would wake up feeling perfectly fine and carry on with her usual activities. Two months ago, she reports similar onset of room spinning, dizziness with nausea and vomiting. However, she never passed out. She was laying in bed the entire time, unable to fall asleep. Symptoms eventually resolved after a few hours. She reports an abnormality in her EKG which she says Debbie Rizzo was born with\". She has had mutliple echocardiograms and stress tests done for further evaluation in the past.  These were done by [facility], when they practiced at [facility].    She currently denies complaints of chest pains, dizziness, orthopnea, PND or edema. She denies shortness of breath or palpitation symptoms. She had 30-day event monitor, which was completed yesterday, results are pending. She did not have any symptoms during the monitoring period. She is a smoker, 6 cigarettes a day. Her mother had coronary artery disease. Apparently she had \"total blockages of all her heart arteries\", but seemed to have developed collaterals.          PCP Provider  Aníbal Gonzalez MD  Past Medical History:   Diagnosis Date    GERD (gastroesophageal reflux disease) 2/23/2010    Hypertriglyceridemia 2/23/2010    Hypovitaminosis D     OA (osteoarthritis) 2/23/2010    Osteoarthritis     Vitamin D deficiency 2/23/2010      Past Surgical History:   Procedure Laterality Date    HX ACL RECONSTRUCTION  1998    HX CATARACT REMOVAL  06/2018    right eye    HX HEENT  12/08    cervical neck stenosis s/p cervical release    HX HERNIA REPAIR  3844    umbilical    HX HIP REPLACEMENT  6/2007    right    HX HIP REPLACEMENT  12/2007    left    HX ORTHOPAEDIC  12/2008    cervical release    MT COLONOSCOPY FLX DX W/COLLJ SPEC WHEN PFRMD  06/2006    MT COLONOSCOPY FLX DX W/COLLJ SPEC WHEN PFRMD  08/29/2011    Dr. Hany Davis     Family History   Problem Relation Age of Onset    Hypertension Mother     Heart Failure Mother     Cancer Father         colon    Lung Disease Brother     Arthritis-rheumatoid Brother     Heart Disease Brother      Social History     Socioeconomic History    Marital status:      Spouse name: Not on file    Number of children: Not on file    Years of education: Not on file    Highest education level: Not on file   Occupational History    Not on file   Social Needs    Financial resource strain: Not on file    Food insecurity:     Worry: Not on file     Inability: Not on file    Transportation needs:     Medical: Not on file     Non-medical: Not on file   Tobacco Use    Smoking status: Current Every Day Smoker     Packs/day: 0.25     Years: 55.00     Pack years: 13.75    Smokeless tobacco: Never Used   Substance and Sexual Activity    Alcohol use:  Yes     Alcohol/week: 0.0 standard drinks     Comment: 1 drink a month    Drug use: No    Sexual activity: Not Currently   Lifestyle    Physical activity:     Days per week: Not on file     Minutes per session: Not on file    Stress: Not on file   Relationships    Social connections:     Talks on phone: Not on file     Gets together: Not on file     Attends Mosque service: Not on file     Active member of club or organization: Not on file Attends meetings of clubs or organizations: Not on file     Relationship status: Not on file    Intimate partner violence:     Fear of current or ex partner: Not on file     Emotionally abused: Not on file     Physically abused: Not on file     Forced sexual activity: Not on file   Other Topics Concern    Not on file   Social History Narrative    Not on file       Allergies   Allergen Reactions    Macrobid [Nitrofurantoin Monohyd/M-Cryst] Rash    Pcn [Penicillins] Hives        Current Outpatient Medications   Medication Sig    LORazepam (ATIVAN) 1 mg tablet Take 0.5-1 Tabs by mouth every eight (8) hours as needed for Anxiety.  pantoprazole (PROTONIX) 40 mg tablet Take 1 Tab by mouth daily.  gemfibrozil (LOPID) 600 mg tablet Take 1 Tab by mouth two (2) times a day.  ibuprofen (MOTRIN) 200 mg tablet Take 200 mg by mouth every six (6) hours as needed for Pain.  aspirin 81 mg chewable tablet Take 81 mg by mouth daily.  cetirizine (ZYRTEC) 10 mg tablet Take 10 mg by mouth daily.  Cholecalciferol, Vitamin D3, (VITAMIN D3) 1,000 unit Cap Take 1 Cap by mouth daily. No current facility-administered medications for this visit. I have reviewed the problem list, allergy list, medical history, family, social history and medications. Review of Symptoms:    Review of Systems   Constitutional: Negative for chills, fever and weight loss. HENT: Negative for nosebleeds. Eyes: Negative for blurred vision and double vision. Respiratory: Negative for cough, shortness of breath and wheezing. Cardiovascular: Negative for chest pain, palpitations, orthopnea, leg swelling and PND. Gastrointestinal: Negative for abdominal pain, blood in stool, diarrhea, nausea and vomiting. Musculoskeletal: Negative for joint pain. Skin: Negative for rash. Neurological: Negative for dizziness, tingling and loss of consciousness. Endo/Heme/Allergies: Does not bruise/bleed easily.        Physical Exam:      General: Well developed, in no acute distress, cooperative and alert  HEENT: No carotid bruits, no JVD, trach is midline. Neck Supple, PEERL, EOM intact. Heart:  reg rate and rhythm; normal S1/S2; no murmurs, gallops or rubs. Respiratory: Clear bilaterally x 4, no wheezing or rales  Abdomen:   Soft, non-tender, no distention, no masses. + BS. Extremities:  Normal cap refill, no cyanosis, atraumatic. No edema. Neuro: A&Ox3, speech clear, gait stable. Skin: Skin color is normal. No rashes or lesions. Non diaphoretic  Vascular: 2+ pulses symmetric in all extremities    Vitals:    10/31/19 1441 10/31/19 1457   BP: 138/78 144/80   Pulse: 73    Resp: 16    SpO2: 94%    Weight: 163 lb 3.2 oz (74 kg)    Height: 5' 1\" (1.549 m)        Cardiographics    ECG: sinus rhythm, LBBB  No results found for this or any previous visit. Cardiology Labs:  Lab Results   Component Value Date/Time    Cholesterol, total 219 (H) 10/02/2019 11:28 AM    HDL Cholesterol 52 10/02/2019 11:28 AM    LDL, calculated 138 (H) 10/02/2019 11:28 AM    Triglyceride 146 10/02/2019 11:28 AM    CHOL/HDL Ratio 3.3 09/22/2010 10:02 AM       Lab Results   Component Value Date/Time    Sodium 142 10/02/2019 11:28 AM    Potassium 4.7 10/02/2019 11:28 AM    Chloride 104 10/02/2019 11:28 AM    CO2 20 10/02/2019 11:28 AM    Anion gap 10 09/23/2009 01:22 PM    Glucose 86 10/02/2019 11:28 AM    BUN 16 10/02/2019 11:28 AM    Creatinine 1.09 (H) 10/02/2019 11:28 AM    BUN/Creatinine ratio 15 10/02/2019 11:28 AM    GFR est AA 56 (L) 10/02/2019 11:28 AM    GFR est non-AA 49 (L) 10/02/2019 11:28 AM    Calcium 10.3 10/02/2019 11:28 AM    Bilirubin, total 0.4 10/02/2019 11:28 AM    AST (SGOT) 17 10/02/2019 11:28 AM    Alk.  phosphatase 113 10/02/2019 11:28 AM    Protein, total 6.7 10/02/2019 11:28 AM    Albumin 4.5 10/02/2019 11:28 AM    Globulin 2.8 09/23/2009 01:22 PM    A-G Ratio 2.0 10/02/2019 11:28 AM    ALT (SGPT) 11 10/02/2019 11:28 AM Assessment:     Assessment:       ICD-10-CM ICD-9-CM    1. Syncope, unspecified syncope type R55 780.2    2. LBBB (left bundle branch block) I44.7 426.3    3. Hypertriglyceridemia E78.1 272.1 AMB POC EKG ROUTINE W/ 12 LEADS, INTER & REP        Plan:     1. Syncope, unspecified syncope type  Symptoms occur about once every 10-20 years. Last episode 2 months ago  Event monitor complete; prelim results not concerning for arrhythmias, SSS or ventricular arrhythmias  Will obtain echocardiogram and Lexiscan stress test  Consider vestibular/neuro etiology given associated nausea with symptoms. 2. LBBB (left bundle branch block)  New LBBB on EKG as compared to 2010  No symptoms of chest pain with exertion, shortness of breath. Has had     3. Hypertriglyceridemia   in 10/2019  Not on statin therapy  Would recommend statin therapy if cardiac disease is confirmed    F/u with Dr. Addison Traylor after testing complete. Lacie Mcelroy NP       [facility]    10/31/2019         Patient seen, examined by me personally. Plan discussed as detailed. Agree with note as outlined by  NP. I confirm findings in history and physical exam. No additional findings noted. Agree with plan as outlined above. Preliminary monitor review shows no arrhythmia. New LBBB. Will evaluate as noted.      Braeden Cornell MD

## 2019-10-31 NOTE — PROGRESS NOTES
1. Have you been to the ER, urgent care clinic since your last visit? Hospitalized since your last visit? YES, ER, [place] MAY 2019.    2. Have you seen or consulted any other health care providers outside of the [address] [place] [address] since your last visit? Include any pap smears or colon screening. NO    NEW PATIENT. NO CARDIAC C/O, PCP RECOMMENDED PT TO F/U WITH CARDIOLOGY. Chris Jaime

## 2019-11-13 ENCOUNTER — TELEPHONE (OUTPATIENT)
Dept: CARDIOLOGY CLINIC | Age: 79
End: 2019-11-13

## 2019-11-13 NOTE — PROGRESS NOTES
Dayo,    Please call the patient and inform that her event monitor showed rare PVCs, which are extra heart beats from the bottom of the heart. These are benign and not concerning, and not causing her passing out spells. She had some episodes of fast heart rates, but again, appears she was not symptomatic with this. She did not report any fainting spells, dizziness/passing out spells during the monitoring period. We did not see any arrhythmias that would be concerning, either. Will call back once echo and stress test are completed.     Thanks,  [employer]

## 2019-11-13 NOTE — TELEPHONE ENCOUNTER
Called patient to confirm Nuclear stress test for 11/15 Reviewed with patient the need to hold all caffeine containing food, beverages and medicines for 24 hours. Pt verbalized understanding.

## 2019-11-27 ENCOUNTER — OFFICE VISIT (OUTPATIENT)
Dept: CARDIOLOGY CLINIC | Age: 79
End: 2019-11-27

## 2019-11-27 VITALS
HEIGHT: 61 IN | DIASTOLIC BLOOD PRESSURE: 78 MMHG | RESPIRATION RATE: 16 BRPM | SYSTOLIC BLOOD PRESSURE: 126 MMHG | OXYGEN SATURATION: 97 % | BODY MASS INDEX: 31.17 KG/M2 | HEART RATE: 78 BPM | WEIGHT: 165.1 LBS

## 2019-11-27 DIAGNOSIS — E78.1 HYPERTRIGLYCERIDEMIA: ICD-10-CM

## 2019-11-27 DIAGNOSIS — R94.39 ABNORMAL STRESS TEST: ICD-10-CM

## 2019-11-27 DIAGNOSIS — R55 SYNCOPE, UNSPECIFIED SYNCOPE TYPE: Primary | ICD-10-CM

## 2019-11-27 RX ORDER — METOPROLOL SUCCINATE 25 MG/1
25 TABLET, EXTENDED RELEASE ORAL DAILY
Qty: 30 TAB | Refills: 1 | Status: SHIPPED | OUTPATIENT
Start: 2019-11-27 | End: 2019-12-30

## 2019-11-27 NOTE — PROGRESS NOTES
NAME:  Eliazar Castañeda   :   1940   MRN:   [id number]   PCP:  Deborah Floyd MD           Subjective: The patient is a 67y.o. year old female  who returns for a routine follow-up. Since the last visit, patient reports no change in exercise tolerance, edema, medication intolerance, palpitations,  PND/orthopnea wheezing, sputum, syncope, dizziness or light headedness. Continues to have SOB/chest tightness. Past Medical History:   Diagnosis Date    GERD (gastroesophageal reflux disease) 2010    Hypertriglyceridemia 2010    Hypovitaminosis D     OA (osteoarthritis) 2010    Osteoarthritis     Vitamin D deficiency 2010        ICD-10-CM ICD-9-CM    1. Syncope, unspecified syncope type R55 780.2 CBC W/O DIFF      METABOLIC PANEL, BASIC      PROTHROMBIN TIME + INR   2. Hypertriglyceridemia E78.1 272.1    3. Abnormal stress test R94.39 794.39       Social History     Tobacco Use    Smoking status: Former Smoker     Packs/day: 0.25     Years: 55.00     Pack years: 13.75     Last attempt to quit: 2019     Years since quittin.0    Smokeless tobacco: Never Used   Substance Use Topics    Alcohol use: Yes     Alcohol/week: 0.0 standard drinks     Comment: 1 drink a month      Family History   Problem Relation Age of Onset    Hypertension Mother     Heart Failure Mother     Cancer Father         colon    Lung Disease Brother     Arthritis-rheumatoid Brother     Heart Disease Brother         Review of Systems  General: Pt denies excessive weight gain or loss. Pt is able to conduct ADL's  HEENT: Denies blurred vision, headaches, epistaxis and difficulty swallowing. Respiratory: Denies shortness of breath, MALCOLM, wheezing or stridor.   Cardiovascular: Denies precordial pain, palpitations, edema or PND  Gastrointestinal: Denies poor appetite, indigestion, abdominal pain or blood in stool  Musculoskeletal: Denies pain or swelling from muscles or joints  Neurologic: Denies tremor, paresthesias, or sensory motor disturbance  Skin: Denies rash, itching or texture change. Objective:       Vitals:    11/27/19 1009 11/27/19 1026   BP: 124/80 126/78   Pulse: 78    Resp: 16    SpO2: 97%    Weight: 165 lb 1.6 oz (74.9 kg)    Height: 5' 1\" (1.549 m)     Body mass index is 31.2 kg/m². General PE  Mental Status - Alert. General Appearance - Not in acute distress. Chest and Lung Exam   Inspection: Accessory muscles - No use of accessory muscles in breathing. Auscultation:   Breath sounds: - Normal.    Cardiovascular   Inspection: Jugular vein - Bilateral - Inspection Normal.  Palpation/Percussion:   Apical Impulse: - Normal.  Auscultation: Rhythm - Regular. Heart Sounds - S1 WNL and S2 WNL. No S3 or S4. Murmurs & Other Heart Sounds: Auscultation of the heart reveals - No Murmurs. Peripheral Vascular   Upper Extremity: Inspection - Bilateral - No Cyanotic nailbeds or Digital clubbing. Lower Extremity:   Palpation: Edema - Bilateral - No edema. Data Review:     EKG -EKG: LBBB. Medications reviewed  Current Outpatient Medications   Medication Sig    metoprolol succinate (TOPROL-XL) 25 mg XL tablet Take 1 Tab by mouth daily.  LORazepam (ATIVAN) 1 mg tablet Take 0.5-1 Tabs by mouth every eight (8) hours as needed for Anxiety.  pantoprazole (PROTONIX) 40 mg tablet Take 1 Tab by mouth daily.  gemfibrozil (LOPID) 600 mg tablet Take 1 Tab by mouth two (2) times a day.  ibuprofen (MOTRIN) 200 mg tablet Take 200 mg by mouth every six (6) hours as needed for Pain.  aspirin 81 mg chewable tablet Take 81 mg by mouth daily.  cetirizine (ZYRTEC) 10 mg tablet Take 10 mg by mouth daily.  Cholecalciferol, Vitamin D3, (VITAMIN D3) 1,000 unit Cap Take 1 Cap by mouth daily. No current facility-administered medications for this visit. Assessment:       ICD-10-CM ICD-9-CM    1.  Syncope, unspecified syncope type R55 780.2 CBC W/O DIFF      METABOLIC PANEL, BASIC      PROTHROMBIN TIME + INR   2. Hypertriglyceridemia E78.1 272.1    3. Abnormal stress test R94.39 794.39         Plan:     Patient presents with continued symptoms. Has LBBB, large anterior wall perfusion defect. Discussed cath/PCI procedure, risks, alternatives.      [address] [place] [id number], [address], MD

## 2019-11-27 NOTE — PROGRESS NOTES
1. Have you been to the ER, urgent care clinic since your last visit? Hospitalized since your last visit? NO      2. Have you seen or consulted any other health care providers outside of the [address] [place] [address] since your last visit? Include any pap smears or colon screening. YES, ENDO. RESULTS.   NO CARDIAC C/O

## 2019-11-27 NOTE — H&P (VIEW-ONLY)
NAME:  Benjamin Lockwood :   1940 MRN:   [id number] PCP:  Ernesto Browning MD 
 
    
 
Subjective: The patient is a 67y.o. year old female  who returns for a routine follow-up. Since the last visit, patient reports no change in exercise tolerance, edema, medication intolerance, palpitations,  PND/orthopnea wheezing, sputum, syncope, dizziness or light headedness. Continues to have SOB/chest tightness. Past Medical History:  
Diagnosis Date  GERD (gastroesophageal reflux disease) 2010  Hypertriglyceridemia 2010  Hypovitaminosis D   
 OA (osteoarthritis) 2010  Osteoarthritis  Vitamin D deficiency 2010 ICD-10-CM ICD-9-CM 1. Syncope, unspecified syncope type R55 780.2 CBC W/O DIFF  
   METABOLIC PANEL, BASIC  
   PROTHROMBIN TIME + INR  
2. Hypertriglyceridemia E78.1 272.1 3. Abnormal stress test R94.39 794.39 Social History Tobacco Use  Smoking status: Former Smoker Packs/day: 0.25 Years: 55.00 Pack years: 13.75 Last attempt to quit: 2019 Years since quittin.0  Smokeless tobacco: Never Used Substance Use Topics  Alcohol use: Yes Alcohol/week: 0.0 standard drinks Comment: 1 drink a month Family History Problem Relation Age of Onset  Hypertension Mother  Heart Failure Mother  Cancer Father   
     colon  Lung Disease Brother  Arthritis-rheumatoid Brother  Heart Disease Brother Review of Systems General: Pt denies excessive weight gain or loss. Pt is able to conduct ADL's HEENT: Denies blurred vision, headaches, epistaxis and difficulty swallowing. Respiratory: Denies shortness of breath, MALCOLM, wheezing or stridor. Cardiovascular: Denies precordial pain, palpitations, edema or PND Gastrointestinal: Denies poor appetite, indigestion, abdominal pain or blood in stool Musculoskeletal: Denies pain or swelling from muscles or joints Neurologic: Denies tremor, paresthesias, or sensory motor disturbance Skin: Denies rash, itching or texture change. Objective:  
 
 
Vitals:  
 11/27/19 1009 11/27/19 1026 BP: 124/80 126/78 Pulse: 78 Resp: 16 SpO2: 97% Weight: 165 lb 1.6 oz (74.9 kg) Height: 5' 1\" (1.549 m) Body mass index is 31.2 kg/m². Chriss Chin Mental Status - Alert. General Appearance - Not in acute distress. Chest and Lung Exam  Inspection: Accessory muscles - No use of accessory muscles in breathing. Auscultation:  
Breath sounds: - Normal. 
 
Cardiovascular  
Inspection: Jugular vein - Bilateral - Inspection Normal. 
Palpation/Percussion:  
Apical Impulse: - Normal. 
Auscultation: Rhythm - Regular. Heart Sounds - S1 WNL and S2 WNL. No S3 or S4. Murmurs & Other Heart Sounds: Auscultation of the heart reveals - No Murmurs. Peripheral Vascular  
Upper Extremity: Inspection - Bilateral - No Cyanotic nailbeds or Digital clubbing. Lower Extremity:  
Palpation: Edema - Bilateral - No edema. Data Review:  
 
EKG -EKG: LBBB. Medications reviewed Current Outpatient Medications Medication Sig  
 metoprolol succinate (TOPROL-XL) 25 mg XL tablet Take 1 Tab by mouth daily.  LORazepam (ATIVAN) 1 mg tablet Take 0.5-1 Tabs by mouth every eight (8) hours as needed for Anxiety.  pantoprazole (PROTONIX) 40 mg tablet Take 1 Tab by mouth daily.  gemfibrozil (LOPID) 600 mg tablet Take 1 Tab by mouth two (2) times a day.  ibuprofen (MOTRIN) 200 mg tablet Take 200 mg by mouth every six (6) hours as needed for Pain.  aspirin 81 mg chewable tablet Take 81 mg by mouth daily.  cetirizine (ZYRTEC) 10 mg tablet Take 10 mg by mouth daily.  Cholecalciferol, Vitamin D3, (VITAMIN D3) 1,000 unit Cap Take 1 Cap by mouth daily. No current facility-administered medications for this visit. Assessment: ICD-10-CM ICD-9-CM 1. Syncope, unspecified syncope type R55 780.2 CBC W/O DIFF  
   METABOLIC PANEL, BASIC  
   PROTHROMBIN TIME + INR  
2. Hypertriglyceridemia E78.1 272.1 3. Abnormal stress test R94.39 794.39 Plan:  
 
Patient presents with continued symptoms. Has LBBB, large anterior wall perfusion defect. Discussed cath/PCI procedure, risks, alternatives. [address] [place] [address] [id number], [id number] Favian Lugo MD

## 2019-12-02 ENCOUNTER — HOSPITAL ENCOUNTER (OUTPATIENT)
Dept: LAB | Age: 79
Discharge: HOME | End: 2019-12-02
Payer: MEDICARE

## 2019-12-02 PROCEDURE — 85610 PROTHROMBIN TIME: CPT

## 2019-12-02 PROCEDURE — 85027 COMPLETE CBC AUTOMATED: CPT

## 2019-12-02 PROCEDURE — 36415 COLL VENOUS BLD VENIPUNCTURE: CPT

## 2019-12-02 PROCEDURE — 80048 BASIC METABOLIC PNL TOTAL CA: CPT

## 2019-12-03 LAB
BUN SERPL-MCNC: 20 MG/DL (ref 8–27)
BUN/CREAT SERPL: 17 (ref 12–28)
CALCIUM SERPL-MCNC: 10.1 MG/DL (ref 8.7–10.3)
CHLORIDE SERPL-SCNC: 103 MMOL/L (ref 96–106)
CO2 SERPL-SCNC: 21 MMOL/L (ref 20–29)
CREAT SERPL-MCNC: 1.16 MG/DL (ref 0.57–1)
ERYTHROCYTE [DISTWIDTH] IN BLOOD BY AUTOMATED COUNT: 12.4 % (ref 12.3–15.4)
GLUCOSE SERPL-MCNC: 100 MG/DL (ref 65–99)
HCT VFR BLD AUTO: 40.2 % (ref 34–46.6)
HGB BLD-MCNC: 13.3 G/DL (ref 11.1–15.9)
INR PPP: 1 (ref 0.8–1.2)
INTERPRETATION: NORMAL
MCH RBC QN AUTO: 29 PG (ref 26.6–33)
MCHC RBC AUTO-ENTMCNC: 33.1 G/DL (ref 31.5–35.7)
MCV RBC AUTO: 88 FL (ref 79–97)
PLATELET # BLD AUTO: 375 X10E3/UL (ref 150–450)
POTASSIUM SERPL-SCNC: 4.3 MMOL/L (ref 3.5–5.2)
PROTHROMBIN TIME: 10.6 SEC (ref 9.1–12)
RBC # BLD AUTO: 4.58 X10E6/UL (ref 3.77–5.28)
SODIUM SERPL-SCNC: 140 MMOL/L (ref 134–144)
WBC # BLD AUTO: 8.5 X10E3/UL (ref 3.4–10.8)

## 2019-12-09 ENCOUNTER — HOSPITAL ENCOUNTER (OUTPATIENT)
Age: 79
Discharge: HOME | End: 2019-12-09
Attending: INTERNAL MEDICINE | Admitting: INTERNAL MEDICINE
Payer: MEDICARE

## 2019-12-09 VITALS
OXYGEN SATURATION: 98 % | HEART RATE: 75 BPM | TEMPERATURE: 97.9 F | BODY MASS INDEX: 30.21 KG/M2 | RESPIRATION RATE: 17 BRPM | HEIGHT: 61 IN | DIASTOLIC BLOOD PRESSURE: 56 MMHG | WEIGHT: 160 LBS | SYSTOLIC BLOOD PRESSURE: 118 MMHG

## 2019-12-09 DIAGNOSIS — R07.9 CHEST PAIN, UNSPECIFIED TYPE: ICD-10-CM

## 2019-12-09 PROCEDURE — 77030015766: Performed by: INTERNAL MEDICINE

## 2019-12-09 PROCEDURE — 99152 MOD SED SAME PHYS/QHP 5/>YRS: CPT | Performed by: INTERNAL MEDICINE

## 2019-12-09 PROCEDURE — 77030008543 HC TBNG MON PRSS MRTM -A: Performed by: INTERNAL MEDICINE

## 2019-12-09 PROCEDURE — 77030019698 HC SYR ANGI MDLON MRTM -A: Performed by: INTERNAL MEDICINE

## 2019-12-09 PROCEDURE — 77030010221 HC SPLNT WR POS TELE -B: Performed by: INTERNAL MEDICINE

## 2019-12-09 PROCEDURE — C1894 INTRO/SHEATH, NON-LASER: HCPCS | Performed by: INTERNAL MEDICINE

## 2019-12-09 PROCEDURE — C1769 GUIDE WIRE: HCPCS | Performed by: INTERNAL MEDICINE

## 2019-12-09 PROCEDURE — 99153 MOD SED SAME PHYS/QHP EA: CPT | Performed by: INTERNAL MEDICINE

## 2019-12-09 PROCEDURE — 77030030195 HC CATH ANGI DX PRF4 MRTM -A: Performed by: INTERNAL MEDICINE

## 2019-12-09 PROCEDURE — 74011000250 HC RX REV CODE- 250: Performed by: INTERNAL MEDICINE

## 2019-12-09 PROCEDURE — 77030019569 HC BND COMPR RAD TERU -B: Performed by: INTERNAL MEDICINE

## 2019-12-09 PROCEDURE — 77030028837 HC SYR ANGI PWR INJ COEU -A: Performed by: INTERNAL MEDICINE

## 2019-12-09 PROCEDURE — 74011636320 HC RX REV CODE- 636/320: Performed by: INTERNAL MEDICINE

## 2019-12-09 PROCEDURE — 77030004549 HC CATH ANGI DX PRF MRTM -A: Performed by: INTERNAL MEDICINE

## 2019-12-09 PROCEDURE — 74011250636 HC RX REV CODE- 250/636: Performed by: INTERNAL MEDICINE

## 2019-12-09 PROCEDURE — 93458 L HRT ARTERY/VENTRICLE ANGIO: CPT | Performed by: INTERNAL MEDICINE

## 2019-12-09 RX ORDER — FENTANYL CITRATE 50 UG/ML
INJECTION, SOLUTION INTRAMUSCULAR; INTRAVENOUS AS NEEDED
Status: DISCONTINUED | OUTPATIENT
Start: 2019-12-09 | End: 2019-12-09 | Stop reason: HOSPADM

## 2019-12-09 RX ORDER — HEPARIN SODIUM 200 [USP'U]/100ML
INJECTION, SOLUTION INTRAVENOUS
Status: COMPLETED | OUTPATIENT
Start: 2019-12-09 | End: 2019-12-09

## 2019-12-09 RX ORDER — VERAPAMIL HYDROCHLORIDE 2.5 MG/ML
INJECTION, SOLUTION INTRAVENOUS AS NEEDED
Status: DISCONTINUED | OUTPATIENT
Start: 2019-12-09 | End: 2019-12-09 | Stop reason: HOSPADM

## 2019-12-09 RX ORDER — SODIUM CHLORIDE 0.9 % (FLUSH) 0.9 %
5-40 SYRINGE (ML) INJECTION AS NEEDED
Status: DISCONTINUED | OUTPATIENT
Start: 2019-12-09 | End: 2019-12-09 | Stop reason: HOSPADM

## 2019-12-09 RX ORDER — MIDAZOLAM HYDROCHLORIDE 1 MG/ML
INJECTION, SOLUTION INTRAMUSCULAR; INTRAVENOUS AS NEEDED
Status: DISCONTINUED | OUTPATIENT
Start: 2019-12-09 | End: 2019-12-09 | Stop reason: HOSPADM

## 2019-12-09 RX ORDER — SODIUM CHLORIDE 9 MG/ML
75 INJECTION, SOLUTION INTRAVENOUS CONTINUOUS
Status: DISCONTINUED | OUTPATIENT
Start: 2019-12-09 | End: 2019-12-09 | Stop reason: HOSPADM

## 2019-12-09 RX ORDER — LIDOCAINE HYDROCHLORIDE 10 MG/ML
INJECTION, SOLUTION EPIDURAL; INFILTRATION; INTRACAUDAL; PERINEURAL AS NEEDED
Status: DISCONTINUED | OUTPATIENT
Start: 2019-12-09 | End: 2019-12-09 | Stop reason: HOSPADM

## 2019-12-09 RX ORDER — ASPIRIN 81 MG/1
81 TABLET ORAL DAILY
Qty: 30 TAB | Refills: 5 | Status: SHIPPED | OUTPATIENT
Start: 2019-12-09 | End: 2020-07-01 | Stop reason: SDUPTHER

## 2019-12-09 RX ORDER — SODIUM CHLORIDE 0.9 % (FLUSH) 0.9 %
5-40 SYRINGE (ML) INJECTION EVERY 8 HOURS
Status: DISCONTINUED | OUTPATIENT
Start: 2019-12-09 | End: 2019-12-09 | Stop reason: HOSPADM

## 2019-12-09 RX ORDER — HEPARIN SODIUM 1000 [USP'U]/ML
INJECTION, SOLUTION INTRAVENOUS; SUBCUTANEOUS AS NEEDED
Status: DISCONTINUED | OUTPATIENT
Start: 2019-12-09 | End: 2019-12-09 | Stop reason: HOSPADM

## 2019-12-09 NOTE — Clinical Note
left ventricle obtained using power injection. Total volume = 18 mL. Rate = 12 mL/sec. Pressure = 900 PSI.

## 2019-12-09 NOTE — INTERVAL H&P NOTE
H&P Update: 
Wagner Foote was seen and examined. History and physical has been reviewed. The patient has been examined.  There have been no significant clinical changes since the completion of the originally dated History and Physical.

## 2019-12-09 NOTE — DISCHARGE INSTRUCTIONS
[address] [place] [address]  [phone]        Patient ID:  Live Hannah  [id number]  66 y.o.  1940    Admit Date: 12/9/2019    Discharge Date: 12/9/2019     Admitting Physician: Gill Zarate MD     Discharge Physician: Gill Zarate MD    Admission Diagnoses:   Chest pain, unspecified type [R07.9]    Discharge Diagnoses: Active Problems:    * No active hospital problems. *      Discharge Condition: Good    Cardiology Procedures this Admission:  Diagnostic left heart catheterization    Disposition: home    Reference discharge instructions provided by nursing for diet and activity. Signed:  Gill Zarate MD  12/9/2019  5:44 PM      Radial Cardiac Catheterization/Angiography Discharge Instructions    It is normal to feel tired the first couple days. Take it easy and follow the physicians instructions. CHECK THE CATHETER INSERTION SITE DAILY:    Remove the wrist dressing 24 hours after the procedure. You may shower 24 hours after the procedure. Wash with soap and water and pat dry. Gentle cleaning of the site with soap and water is sufficient, cover with a dry clean dressing or bandage. Do not apply creams or powders to the area. No soaking the wrist for 3 days. Leave the puncture site open to air after 24 hours post-procedure. CALL THE PHYSICIANS:     If the site becomes red, swollen or feels warm to the touch  If there is bleeding or drainage or if there is unusual pain at the radial site. If there is any minor oozing, you may apply a band-aid and remove after 12 hours. If the bleeding continues, hold pressure with the middle finger against the puncture site and the thumb against the back of the wrist,call 911 to be transported to the hospital.  DO NOT DRIVE YOURSELF, [facility]     ACTIVITY:   For the first 24 hours do not manipulate the wrist.  No lifting, pushing or pulling over 3-5 pounds with the affected wrist for 7 daysand no straining the insertion site. Do not life grocery bags or the garbage can, do not run the vacuum  or  for 7 days. Start with short walks as in the hospital and gradually increase as tolerated each day. It is recommended to walk 30 minutes 5-7 days per week. Follow your physicians instructions on activity. Avoid walking outside in extremes of heat or cold. Walk inside when it is cold and windy or hot and humid. Things to keep in mind:  No driving for at least 24 hours, or as designated by your physician. Limit the number of times you go up and down the stairs  Take rests and pace yourself with activity. Be careful and do not strain with bowel movements. MEDICATIONS:    Take all medications as prescribed  Call your physician if you have any questions  Keep an updated list of your medications with you at all times and give a list to your physician and pharmacist    SIGNS AND SYMPTOMS:   Be cautious of symptoms of angina or recurrent symptoms such as chest discomfort, unusual shortness of breath or fatigue. These could be symptoms of restenosis, a new blockage or a heart attack. If your symptoms are relieved with rest it is still recommended that you notify your physician of recurrent chest pain or discomfort. For CHEST PAIN or symptoms of angina not relieved with rest:  If the discomfort is not relieved with rest, and you have been prescribed Nitroglycerin, take as directed (taken under the tongue, one at a time 5 minutes apart for a total of 3 doses). If the discomfort is not relieved after the 3rd nitroglycerin, call 911. If you have not been prescribed Nitroglycerin  and your chest discomfort is not relieved with rest, call 911. AFTER CARE:   Follow up with your physician as instructed.   Follow a heart healthy diet with proper portion control, daily stress management, daily exercise, blood pressure and cholesterol control , and smoking cessation. 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 Pt was found to be in  acute respiratory failure- ABG showed pH of 7.21, pCO2 98, pO2 79.Pt was intubated in the ED. Pt also in septic shock secondary to aspiration pna, was started on levophed for pressor support.   Pt was initially treated with vanc and meropenum for hospital acquired pna in the setting of recent hospital admission but sputum cx positive for MSSA and abx were deescalated to IV CEFAZOLIN.   Of note patient has a hx of paroxysmal a fib however never anti coagulated given hx of gastric AVM.  During course of hospital admission pt went into rapid afib requiring increasing levophed requirements. Pt was cardioverted and has been in sinus rhythm since. Started on 400 mg amiodarone bid.   Pt has a hx of dysphagia, upon last admission for asp pna there was plan for PEG however pt passed barium swallow and that was held off. In the setting for dysphagia and asp pna Dr Hale planned for bedside PEG tube , placed on 8/6/18.   Pt extubated today     OVERNIGHT EVENTS: no acute events.   saturating > 88-93% on aeresol mask    INTERVAL HPI: Pt examined at bedside. Resting comfortably. Pt is responding to verbal commands. Currently saturating well on aeresol mask     ICU Vital Signs Last 24 Hrs  T(C): 37.2 (08 Aug 2018 14:16), Max: 37.4 (08 Aug 2018 01:27)  T(F): 98.9 (08 Aug 2018 14:16), Max: 99.3 (08 Aug 2018 01:27)  HR: 78 (08 Aug 2018 14:15) (64 - 88)  BP: 99/54 (08 Aug 2018 14:15) (79/39 - 108/54)  BP(mean): 75 (08 Aug 2018 14:15) (58 - 78)  ABP: --  ABP(mean): --  RR: 35 (08 Aug 2018 14:15) (18 - 38)  SpO2: 97% (08 Aug 2018 14:00) (85% - 99%)    I&O's Summary    07 Aug 2018 07:01  -  08 Aug 2018 07:00  --------------------------------------------------------  IN: 465 mL / OUT: 1530 mL / NET: -1065 mL    08 Aug 2018 07:01  -  08 Aug 2018 15:36  --------------------------------------------------------  IN: 4 mL / OUT: 115 mL / NET: -111 mL          LABS:                        8.2    11.3  )-----------( 445      ( 08 Aug 2018 06:44 )             26.6     08-08    142  |  104  |  18  ----------------------------<  125<H>  4.8   |  28  |  0.94    Ca    8.4      08 Aug 2018 06:44  Phos  3.5     08-08  Mg     2.4     08-08          CAPILLARY BLOOD GLUCOSE            RADIOLOGY & ADDITIONAL TESTS:    Consultant(s) Notes Reviewed:  [x ] YES  [ ] NO    MEDICATIONS  (STANDING):  acetaminophen    Suspension. 650 milliGRAM(s) Oral daily  acetaminophen   Tablet. 650 milliGRAM(s) Oral once  ALBUTerol/ipratropium for Nebulization 3 milliLiter(s) Nebulizer every 6 hours  amiodarone    Tablet 400 milliGRAM(s) Oral two times a day  ceFAZolin   IVPB 1000 milliGRAM(s) IV Intermittent every 12 hours  docusate sodium Liquid 100 milliGRAM(s) Oral two times a day  heparin  Injectable 5000 Unit(s) SubCutaneous every 12 hours  metoprolol tartrate 12.5 milliGRAM(s) Oral every 12 hours  midodrine 10 milliGRAM(s) Oral every 8 hours  pantoprazole   Suspension 40 milliGRAM(s) Oral before breakfast  senna Syrup 10 milliLiter(s) Oral at bedtime    MEDICATIONS  (PRN):      PHYSICAL EXAM:  extubated  General: WDWN. Currently on aerosol mask.   HEENT: NC/AT; PERRL, anicteric sclera; MMM  Neck: supple  Cardiovascular: +S1/S2; RRR  Respiratory: B/L crackles noted.   Gastrointestinal: soft, NT/ND; +BSx4  Extremities: LLE swelling > Right sided   Vascular: 2+ radial, DP/PT pulses B/L  Neurological: AAOx3; no focal deficits    Care Discussed with Consultants/Other Providers [ x] YES  [ ] NO

## 2019-12-09 NOTE — PROGRESS NOTES
Reviewed catheterization films with Dr. Aram Younbglood. The patient has a significant stenosis in a small lower diagonal branch, which is not in the distribution of her large fixed defect on stress testing. Diagonal territory perfusion appears normal.  She denies any chest pain or shortness of breath to me. I recommend medical management of this lesion unless refractory symptoms.

## 2019-12-09 NOTE — Clinical Note
TRANSFER - OUT REPORT:     Verbal report given to: Jayden Mckeon. Report consisted of patient's Situation, Background, Assessment and   Recommendations(SBAR). Opportunity for questions and clarification was provided. Patient transported with a Registered Nurse. Patient transported to: [facility].

## 2019-12-10 NOTE — PROGRESS NOTES
Bedside and Verbal shift change report given to Marcos Lovell (oncoming nurse) by Greg Pérez (offgoing nurse). Report included the following information SBAR, Kardex, Intake/Output, MAR, Accordion and Recent Results.

## 2019-12-10 NOTE — PROGRESS NOTES
1915-  Rec'd bedside report from Kera [address] [place] [address]. Pt up to bathroom to void then to chair. Discharge instructions were reviewed with pt and family by Eve Cote [facility].    2015-  Pt ambulated in hallway with nurse. Tolerated well. Back to room. Telemetry monitor removed and PIV d/d'd.    2035-  Transported pt via w/c to front of hospital to [place] [id number] for home discharge. Denied pain at time of D/C.

## 2019-12-30 ENCOUNTER — OFFICE VISIT (OUTPATIENT)
Dept: CARDIOLOGY CLINIC | Age: 79
End: 2019-12-30

## 2019-12-30 VITALS
BODY MASS INDEX: 31.51 KG/M2 | SYSTOLIC BLOOD PRESSURE: 140 MMHG | WEIGHT: 166.9 LBS | OXYGEN SATURATION: 94 % | RESPIRATION RATE: 16 BRPM | HEART RATE: 60 BPM | DIASTOLIC BLOOD PRESSURE: 80 MMHG | HEIGHT: 61 IN

## 2019-12-30 DIAGNOSIS — E78.1 HYPERTRIGLYCERIDEMIA: Primary | ICD-10-CM

## 2019-12-30 DIAGNOSIS — R55 SYNCOPE AND COLLAPSE: ICD-10-CM

## 2019-12-30 DIAGNOSIS — E78.00 HIGH CHOLESTEROL: ICD-10-CM

## 2019-12-30 DIAGNOSIS — I44.7 LBBB (LEFT BUNDLE BRANCH BLOCK): ICD-10-CM

## 2019-12-30 RX ORDER — METOPROLOL SUCCINATE 25 MG/1
12.5 TABLET, EXTENDED RELEASE ORAL
Qty: 45 TAB | Refills: 3 | Status: SHIPPED | OUTPATIENT
Start: 2019-12-30 | End: 2020-06-30

## 2019-12-30 RX ORDER — RANOLAZINE 500 MG/1
500 TABLET, EXTENDED RELEASE ORAL 2 TIMES DAILY
Qty: 180 TAB | Refills: 3 | Status: SHIPPED | OUTPATIENT
Start: 2019-12-30 | End: 2020-07-01 | Stop reason: SDUPTHER

## 2019-12-30 RX ORDER — ROSUVASTATIN CALCIUM 20 MG/1
20 TABLET, COATED ORAL
Qty: 90 TAB | Refills: 3 | Status: SHIPPED | OUTPATIENT
Start: 2019-12-30 | End: 2020-07-01 | Stop reason: SDUPTHER

## 2019-12-30 NOTE — PROGRESS NOTES
1. Have you been to the ER, urgent care clinic since your last visit? Hospitalized since your last visit? Yes 12/2019    2. Have you seen or consulted any other health care providers outside of the [address] [place] [address] since your last visit? Include any pap smears or colon screening. No    Chief Complaint   Patient presents with   [facility] Follow Up     S/P left heart cath    She has bee taking 12.5 mg Toprol daily.

## 2019-12-30 NOTE — PROGRESS NOTES
[facility] @ [facility]    María Elena Nithin DNP, ANP-BC  Subjective/HPI:     Perla Stearns is a 67 y.o. female is here for cardiac catheterization follow-up, benefit to medically treat the proximal RCA and distal second DIAG lesion. Left Main   The vessel was visualized by angiography. The vessel is angiographically normal.   Left Anterior Descending   The vessel was visualized by angiography. The vessel is angiographically normal.   Second Diagonal Branch   2nd Diag lesion 80% stenosed. .   Left Circumflex   The vessel was visualized by angiography. The vessel is angiographically normal.   Right Coronary Artery   Prox RCA lesion 40% stenosed. .   Intervention     No interventions have been documented.        PCP Provider  Romeo Leo MD  Past Medical History:   Diagnosis Date    GERD (gastroesophageal reflux disease) 2/23/2010    Hypertriglyceridemia 2/23/2010    Hypovitaminosis D     OA (osteoarthritis) 2/23/2010    Osteoarthritis     Vitamin D deficiency 2/23/2010      Past Surgical History:   Procedure Laterality Date    HX ACL RECONSTRUCTION  1998    HX CATARACT REMOVAL  06/2018    right eye    HX HEENT  12/08    cervical neck stenosis s/p cervical release    HX HERNIA REPAIR  3019    umbilical    HX HIP REPLACEMENT  6/2007    right    HX HIP REPLACEMENT  12/2007    left    HX ORTHOPAEDIC  12/2008    cervical release    WA COLONOSCOPY FLX DX W/COLLJ SPEC WHEN PFRMD  06/2006    WA COLONOSCOPY FLX DX W/COLLJ SPEC WHEN PFRMD  08/29/2011    Dr. Jeannie Conn [Nitrofurantoin Monohyd/M-Cryst] Rash    Pcn [Penicillins] Hives      Family History   Problem Relation Age of Onset    Hypertension Mother     Heart Failure Mother     Cancer Father         colon    Lung Disease Brother     Arthritis-rheumatoid Brother     Heart Disease Brother       Current Outpatient Medications   Medication Sig    aspirin delayed-release 81 mg tablet Take 1 Tab by mouth daily.  metoprolol succinate (TOPROL-XL) 25 mg XL tablet Take 1 Tab by mouth daily. (Patient taking differently: Take 12.5 mg by mouth daily.)    LORazepam (ATIVAN) 1 mg tablet Take 0.5-1 Tabs by mouth every eight (8) hours as needed for Anxiety.  pantoprazole (PROTONIX) 40 mg tablet Take 1 Tab by mouth daily.  gemfibrozil (LOPID) 600 mg tablet Take 1 Tab by mouth two (2) times a day.  ibuprofen (MOTRIN) 200 mg tablet Take 200 mg by mouth every six (6) hours as needed for Pain.  cetirizine (ZYRTEC) 10 mg tablet Take 10 mg by mouth daily.  Cholecalciferol, Vitamin D3, (VITAMIN D3) 1,000 unit Cap Take 1 Cap by mouth daily. No current facility-administered medications for this visit. Vitals:    19 1004   BP: 140/80   Pulse: 60   Resp: 16   SpO2: 94%   Weight: 166 lb 14.4 oz (75.7 kg)   Height: 5' 1\" (1.549 m)     Social History     Socioeconomic History    Marital status:      Spouse name: Not on file    Number of children: Not on file    Years of education: Not on file    Highest education level: Not on file   Occupational History    Not on file   Social Needs    Financial resource strain: Not on file    Food insecurity:     Worry: Not on file     Inability: Not on file    Transportation needs:     Medical: Not on file     Non-medical: Not on file   Tobacco Use    Smoking status: Former Smoker     Packs/day: 0.25     Years: 55.00     Pack years: 13.75     Last attempt to quit: 2019     Years since quittin.1    Smokeless tobacco: Never Used   Substance and Sexual Activity    Alcohol use:  Yes     Alcohol/week: 0.0 standard drinks     Comment: 1 drink a month    Drug use: No    Sexual activity: Not Currently   Lifestyle    Physical activity:     Days per week: Not on file     Minutes per session: Not on file    Stress: Not on file   Relationships    Social connections:     Talks on phone: Not on file Gets together: Not on file     Attends Synagogue service: Not on file     Active member of club or organization: Not on file     Attends meetings of clubs or organizations: Not on file     Relationship status: Not on file    Intimate partner violence:     Fear of current or ex partner: Not on file     Emotionally abused: Not on file     Physically abused: Not on file     Forced sexual activity: Not on file   Other Topics Concern    Not on file   Social History Narrative    Not on file       I have reviewed the nurses notes, vitals, problem list, allergy list, medical history, family, social history and medications. Review of Symptoms  11 systems reviewed, negative other than as stated in the HPI      Physical Exam:      General: Well developed, in no acute distress, cooperative and alert  HEENT: No carotid bruits, no JVD, trach is midline. Neck Supple, PERRL, EOM intact. Heart:  Normal S1/S2 negative S3 or S4. Regular, no murmur, gallop or rub. Respiratory: Clear bilaterally x 4, no wheezing or rales  Abdomen:   Soft, non-tender, no masses, bowel sounds are active. Extremities:  No edema, normal cap refill, no cyanosis, atraumatic. Neuro: A&Ox3, speech clear, gait stable. Skin: Skin color is normal. No rashes or lesions.  Non diaphoretic  Vascular: 2+ pulses symmetric in all extremities right radial access site well-healed    Cardiographics    ECG:         Cardiology Labs:  Lab Results   Component Value Date/Time    Cholesterol, total 219 (H) 10/02/2019 11:28 AM    HDL Cholesterol 52 10/02/2019 11:28 AM    LDL, calculated 138 (H) 10/02/2019 11:28 AM    Triglyceride 146 10/02/2019 11:28 AM    CHOL/HDL Ratio 3.3 09/22/2010 10:02 AM       Lab Results   Component Value Date/Time    Sodium 140 12/02/2019 09:31 AM    Potassium 4.3 12/02/2019 09:31 AM    Chloride 103 12/02/2019 09:31 AM    CO2 21 12/02/2019 09:31 AM    Anion gap 10 09/23/2009 01:22 PM    Glucose 100 (H) 12/02/2019 09:31 AM    BUN 20 12/02/2019 09:31 AM    Creatinine 1.16 (H) 12/02/2019 09:31 AM    BUN/Creatinine ratio 17 12/02/2019 09:31 AM    GFR est AA 52 (L) 12/02/2019 09:31 AM    GFR est non-AA 45 (L) 12/02/2019 09:31 AM    Calcium 10.1 12/02/2019 09:31 AM    Bilirubin, total 0.4 10/02/2019 11:28 AM    AST (SGOT) 17 10/02/2019 11:28 AM    Alk. phosphatase 113 10/02/2019 11:28 AM    Protein, total 6.7 10/02/2019 11:28 AM    Albumin 4.5 10/02/2019 11:28 AM    Globulin 2.8 09/23/2009 01:22 PM    A-G Ratio 2.0 10/02/2019 11:28 AM    ALT (SGPT) 11 10/02/2019 11:28 AM           Assessment:     Assessment:     Diagnoses and all orders for this visit:    1. Hypertriglyceridemia  -     AMB POC EKG ROUTINE W/ 12 LEADS, INTER & REP    2. LBBB (left bundle branch block)    3. High cholesterol    4. Syncope and collapse        ICD-10-CM ICD-9-CM    1. Hypertriglyceridemia E78.1 272.1 AMB POC EKG ROUTINE W/ 12 LEADS, INTER & REP   2. LBBB (left bundle branch block) I44.7 426.3    3. High cholesterol E78.00 272.0    4. Syncope and collapse R55 780.2      Orders Placed This Encounter    AMB POC EKG ROUTINE W/ 12 LEADS, INTER & REP     Order Specific Question:   Reason for Exam:     Answer:   routine        Plan:     1. Atherosclerotic heart disease: 80% lesion in D2, 40% proximal lesion RCA. Continue aspirin, continue low-dose beta-blocker will advance to dual antianginal therapy Ranexa 500 mg twice a day  2. Hyperlipidemia/hypertriglyceridemia: LDL goal 70, discontinue gemfibrozil start Crestor 20 mg.  Recheck lipid panel in 3 months. Reinforced low carbohydrate diet with patient.   3.  History of syncope and collapse with positive loss of consciousness: 30-day event monitor negative (patient was asymptomatic during the entire monitoring.), patient reports not having symptoms at this time, discussed with patient if her symptoms were to return I would recommend implantable loop recorder as her events would seemingly be greater than 30 days apart.  Follow-up in 3 months    Lyndsay Leblanc NP      Please note that this dictation was completed with Artimi, the computer voice recognition software. Quite often unanticipated grammatical, syntax, homophones, and other interpretive errors are inadvertently transcribed by the computer software. Please disregard these errors. Please excuse any errors that have escaped final proofreading. Thank you. [facility]    12/30/2019         Patient seen, examined by me personally. Plan discussed as detailed. Agree with note as outlined by  NP. I confirm findings in history and physical exam. No additional findings noted. Agree with plan as outlined above.      Jostin Salmon MD

## 2020-01-03 ENCOUNTER — OFFICE VISIT (OUTPATIENT)
Dept: FAMILY MEDICINE CLINIC | Age: 80
End: 2020-01-03

## 2020-01-03 VITALS
RESPIRATION RATE: 18 BRPM | TEMPERATURE: 97.2 F | DIASTOLIC BLOOD PRESSURE: 62 MMHG | HEIGHT: 61 IN | OXYGEN SATURATION: 97 % | HEART RATE: 70 BPM | BODY MASS INDEX: 31.34 KG/M2 | SYSTOLIC BLOOD PRESSURE: 111 MMHG | WEIGHT: 166 LBS

## 2020-01-03 DIAGNOSIS — E78.2 MIXED HYPERLIPIDEMIA: ICD-10-CM

## 2020-01-03 DIAGNOSIS — E66.9 NON MORBID OBESITY: ICD-10-CM

## 2020-01-03 DIAGNOSIS — K21.9 GASTROESOPHAGEAL REFLUX DISEASE WITHOUT ESOPHAGITIS: ICD-10-CM

## 2020-01-03 DIAGNOSIS — M76.30 ILIOTIBIAL BAND SYNDROME, UNSPECIFIED LATERALITY: ICD-10-CM

## 2020-01-03 DIAGNOSIS — R73.02 IGT (IMPAIRED GLUCOSE TOLERANCE): ICD-10-CM

## 2020-01-03 DIAGNOSIS — E55.9 VITAMIN D DEFICIENCY: ICD-10-CM

## 2020-01-03 DIAGNOSIS — M15.9 PRIMARY OSTEOARTHRITIS INVOLVING MULTIPLE JOINTS: ICD-10-CM

## 2020-01-03 DIAGNOSIS — Z51.81 ENCOUNTER FOR MEDICATION MONITORING: ICD-10-CM

## 2020-01-03 DIAGNOSIS — I25.10 CORONARY ARTERY DISEASE INVOLVING NATIVE CORONARY ARTERY OF NATIVE HEART WITHOUT ANGINA PECTORIS: Primary | ICD-10-CM

## 2020-01-03 NOTE — PROGRESS NOTES
Chief Complaint   Patient presents with    Cholesterol Problem     3 Month F/U    Anxiety     1. Have you been to the ER, urgent care clinic since your last visit? Hospitalized since your last visit? No    2. Have you seen or consulted any other health care providers outside of the [address] [place] [address] since your last visit? Include any pap smears or colon screening. Yes, Endocrinology Dr. Gimenez Friday     There are no preventive care reminders to display for this patient.

## 2020-01-03 NOTE — PROGRESS NOTES
HISTORY OF PRESENT ILLNESS  Kayley Talley is a 67 y.o. female. HPI   Follow up from cardiology. Results form her recent eval reviewed with pt.  EF noted to be 30 to 35%. Had cardiac catheterization and was decided to medically treat the proximal RCA and distal second DIAG lesion. Left Main   The vessel was visualized by angiography. The vessel is angiographically normal.   Left Anterior Descending   The vessel was visualized by angiography. The vessel is angiographically normal.   Second Diagonal Branch   2nd Diag lesion 80% stenosed. .   Left Circumflex   The vessel was visualized by angiography. The vessel is angiographically normal.   Right Coronary Artery   Prox RCA lesion 40% stenosed. .   Cardiovascular Review:  The patient has coronary artery disease and hyperlipidemia. She was started on ranexa and crestor. Diet and Lifestyle: generally follows a low fat low cholesterol diet, generally follows a low sodium diet, exercises sporadically, we discussed increasing her exercise regimen. Home BP Monitoring: is not measured at home. Pertinent ROS: taking medications as instructed, no medication side effects noted, no TIA's, no chest pain on exertion, no dyspnea on exertion, no swelling of ankles, no palpitations. Hypertriglyceridemia follow up:  Compliant w/ meds, low fat, low cholesterol diet. She is still taking lopid for the the triglycerides. Exercising some. No muscle nor abdominal pain, no skin discoloration. Osteoarthritis:  Patient has osteoarthritis. Has various joint aches but overall doing well. Symptoms onset: problem is longstanding. Rheumatological ROS: stable, mild-to-moderate joint symptoms intermittently, reasonably well controlled by PRN meds. Response to treatment plan: stable and intermittent. Glucose intolerance reveiw:  She has IGT.     Diabetic ROS - further diabetic ROS: no polyuria or polydipsia, no chest pain, dyspnea or TIA's, no numbness, tingling or pain in extremities, no unusual visual symptoms. Lab review: orders written for new lab studies as appropriate; see orders. Patient Active Problem List   Diagnosis Code    Hypertriglyceridemia E78.1    GERD (gastroesophageal reflux disease) K21.9    OA (osteoarthritis) M19.90    Vitamin D deficiency E55.9    Anxiety F41.9    Environmental allergies Z91.09    Encounter for medication monitoring Z51.81    IGT (impaired glucose tolerance) R73.02       Current Outpatient Medications   Medication Sig Dispense Refill    metoprolol succinate (TOPROL-XL) 25 mg XL tablet Take 0.5 Tabs by mouth nightly. 45 Tab 3    rosuvastatin (CRESTOR) 20 mg tablet Take 1 Tab by mouth nightly. 90 Tab 3    ranolazine ER (RANEXA) 500 mg SR tablet Take 1 Tab by mouth two (2) times a day. 180 Tab 3    aspirin delayed-release 81 mg tablet Take 1 Tab by mouth daily. 30 Tab 5    LORazepam (ATIVAN) 1 mg tablet Take 0.5-1 Tabs by mouth every eight (8) hours as needed for Anxiety. 90 Tab 1    pantoprazole (PROTONIX) 40 mg tablet Take 1 Tab by mouth daily. 90 Tab 3    ibuprofen (MOTRIN) 200 mg tablet Take 200 mg by mouth every six (6) hours as needed for Pain.  cetirizine (ZYRTEC) 10 mg tablet Take 10 mg by mouth daily.  Cholecalciferol, Vitamin D3, (VITAMIN D3) 1,000 unit Cap Take 1 Cap by mouth daily.            Allergies   Allergen Reactions    Macrobid [Nitrofurantoin Monohyd/M-Cryst] Rash    Pcn [Penicillins] Hives       Past Medical History:   Diagnosis Date    GERD (gastroesophageal reflux disease) 2/23/2010    Hypertriglyceridemia 2/23/2010    Hypovitaminosis D     OA (osteoarthritis) 2/23/2010    Osteoarthritis     Vitamin D deficiency 2/23/2010       Past Surgical History:   Procedure Laterality Date    HX ACL RECONSTRUCTION  1998    HX CATARACT REMOVAL  06/2018    right eye    HX HEENT  12/08    cervical neck stenosis s/p cervical release    HX HERNIA REPAIR  8839    umbilical    HX HIP REPLACEMENT  2007    right    HX HIP REPLACEMENT  2007    left    HX ORTHOPAEDIC  2008    cervical release    MT COLONOSCOPY FLX DX W/COLLJ SPEC WHEN PFRMD  2006    MT COLONOSCOPY FLX DX W/COLLJ SPEC WHEN PFRMD  2011    Dr. Lupis Sagastume       Family History   Problem Relation Age of Onset    Hypertension Mother     Heart Failure Mother     Cancer Father         colon    Lung Disease Brother     Arthritis-rheumatoid Brother     Heart Disease Brother        Social History     Tobacco Use    Smoking status: Former Smoker     Packs/day: 0.25     Years: 55.00     Pack years: 13.75     Last attempt to quit: 2019     Years since quittin.1    Smokeless tobacco: Never Used   Substance Use Topics    Alcohol use:  Yes     Alcohol/week: 0.0 standard drinks     Comment: 1 drink a month     Lab Results   Component Value Date/Time    WBC 8.5 2019 09:31 AM    HGB 13.3 2019 09:31 AM    HCT 40.2 2019 09:31 AM    PLATELET 647  09:31 AM    MCV 88 2019 09:31 AM     Lab Results   Component Value Date/Time    Cholesterol, total 219 (H) 10/02/2019 11:28 AM    HDL Cholesterol 52 10/02/2019 11:28 AM    LDL, calculated 138 (H) 10/02/2019 11:28 AM    LDL-C, External 134 2015 07:42 AM    Triglyceride 146 10/02/2019 11:28 AM    CHOL/HDL Ratio 3.3 2010 10:02 AM     Lab Results   Component Value Date/Time    TSH 0.196 (L) 10/02/2019 11:28 AM    T3 Uptake 20 (L) 10/02/2019 11:28 AM    T4, Free 1.15 10/02/2019 11:28 AM    T4, Total 6.3 10/02/2019 11:28 AM      Lab Results   Component Value Date/Time    Sodium 140 2019 09:31 AM    Potassium 4.3 2019 09:31 AM    Chloride 103 2019 09:31 AM    CO2 21 2019 09:31 AM    Anion gap 10 2009 01:22 PM    Glucose 100 (H) 2019 09:31 AM    BUN 20 2019 09:31 AM    Creatinine 1.16 (H) 2019 09:31 AM    BUN/Creatinine ratio 17 2019 09:31 AM    GFR est AA 52 (L) 2019 09:31 AM    GFR est non-AA 45 (L) 12/02/2019 09:31 AM    Calcium 10.1 12/02/2019 09:31 AM    Bilirubin, total 0.4 10/02/2019 11:28 AM    ALT (SGPT) 11 10/02/2019 11:28 AM    AST (SGOT) 17 10/02/2019 11:28 AM    Alk. phosphatase 113 10/02/2019 11:28 AM    Protein, total 6.7 10/02/2019 11:28 AM    Albumin 4.5 10/02/2019 11:28 AM    Globulin 2.8 09/23/2009 01:22 PM    A-G Ratio 2.0 10/02/2019 11:28 AM      Lab Results   Component Value Date/Time    Hemoglobin A1c 6.2 (H) 03/27/2013 08:49 AM    Hemoglobin A1c (POC) 5.8 10/02/2019 11:28 AM    Hemoglobin A1c, External 6.2 06/26/2015              Review of Systems   Constitutional: Negative for malaise/fatigue. HENT: Negative for congestion. Eyes: Negative for blurred vision. Respiratory: Negative for cough and shortness of breath. Cardiovascular: Negative for chest pain, palpitations and leg swelling. Gastrointestinal: Negative for abdominal pain, constipation and heartburn. Genitourinary: Negative for dysuria, frequency and urgency. Musculoskeletal: Negative for back pain and joint pain. She is having pain in both legs that comes and goes. 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 Interested in PT to help with the pain. Increase pain at night with turing in the bed. Neurological: Negative for dizziness, tingling and headaches. Endo/Heme/Allergies: Negative for environmental allergies. Psychiatric/Behavioral: Negative for depression. The patient does not have insomnia. Physical Exam  Vitals signs and nursing note reviewed. Constitutional:       Appearance: Normal appearance. She is well-developed.       Comments: /62 (BP 1 Location: Right arm, BP Patient Position: Sitting)   Pulse 70   Temp 97.2 °F (36.2 °C) (Oral)   Resp 18   Ht 5' 1\" (1.549 m)   Wt 166 lb (75.3 kg)   SpO2 97%   BMI 31.37 kg/m²    HENT:      Right Ear: Tympanic membrane and ear canal normal.      Left Ear: Tympanic membrane and ear canal normal.      Nose: No mucosal edema or rhinorrhea. Neck:      Musculoskeletal: Normal range of motion and neck supple. Thyroid: No thyromegaly. Cardiovascular:      Rate and Rhythm: Normal rate and regular rhythm. Heart sounds: Normal heart sounds. No gallop. Pulmonary:      Effort: Pulmonary effort is normal.      Breath sounds: Normal breath sounds. Abdominal:      General: Bowel sounds are normal.      Palpations: Abdomen is soft. There is no mass. Tenderness: There is no tenderness. Musculoskeletal: Normal range of motion. General: No swelling. Right upper leg: She exhibits tenderness (lateral upper thigh pain. ). Left upper leg: She exhibits tenderness (lateral upper thigh pain). Lymphadenopathy:      Cervical: No cervical adenopathy. Skin:     General: Skin is warm and dry. Neurological:      General: No focal deficit present. Mental Status: She is oriented to person, place, and time. ASSESSMENT and PLAN  Diagnoses and all orders for this visit:    1. Coronary artery disease involving native coronary artery of native heart without angina pectoris  As per cardiology     2. Mixed hyperlipidemia  On crestor which has just been started this week. 3. IGT (impaired glucose tolerance)  Continue to monitor. Work on diet and exercise. 4. Vitamin D deficiency  Continue to monitor. 5. Primary osteoarthritis involving multiple joints//  6. Iliotibial band syndrome, unspecified laterality  -     REFERRAL TO PHYSICAL THERAPY    7. Gastroesophageal reflux disease without esophagitis  Stable on protonix    8. Encounter for medication monitoring    9. Non morbid obesity  I have reviewed/discussed the above normal BMI with the patient. I have recommended the following interventions: dietary management education, guidance, and counseling . Follow-up and Dispositions    · Return in about 2 months (around 3/3/2020).        reviewed diet, exercise and weight control  cardiovascular risk and specific lipid/LDL goals reviewed  reviewed medications and side effects in detail    I have discussed diagnosis listed in this note with pt and/or family. I have discussed treatment plans and options and the risk/benefit analysis of those options, including safe use of medications and possible medication side effects. Through the use of shared decision making we have agreed to the above plan. The patient has received an after-visit summary and questions were answered concerning future plans and follow up. Advise pt of any urgent changes then to proceed to the ER.

## 2020-03-30 DIAGNOSIS — E78.1 HYPERTRIGLYCERIDEMIA: ICD-10-CM

## 2020-03-30 DIAGNOSIS — E78.00 HIGH CHOLESTEROL: ICD-10-CM

## 2020-05-05 ENCOUNTER — TELEPHONE (OUTPATIENT)
Dept: FAMILY MEDICINE CLINIC | Age: 80
End: 2020-05-05

## 2020-05-05 ENCOUNTER — TELEPHONE (OUTPATIENT)
Dept: CARDIOLOGY CLINIC | Age: 80
End: 2020-05-05

## 2020-05-05 NOTE — TELEPHONE ENCOUNTER
Verified patient with two identifiers. Spoke with pt, she is asking for various labs to be drawn, some are not labs Dr. Aditi Rocha would order, she decided she will  Call PCP to order all labs.

## 2020-05-05 NOTE — TELEPHONE ENCOUNTER
Patient called and stated she feels like she needs some labs drawn before her next appt on 6/30/2020 and would like a retyrn call . [phone]

## 2020-05-05 NOTE — TELEPHONE ENCOUNTER
----- Message from Chaitanya Yeboah sent at 5/5/2020  1:31 PM EDT -----  Regarding: Dr. Beaver [employer]: [phone]  Caller's first and last name and relationship to patient (if not the patient):  Best contact number: [phone]  Preferred date and time:  Scheduled appointment date and time:  Reason for appointment:  Details to clarify the request:    Pt requesting labs prior to her upcoming appt June 30th with her cardiologist.   PT requesting Edwin Lamb if you can send orders or however is preferred for you for her labs.

## 2020-05-05 NOTE — TELEPHONE ENCOUNTER
Verified patient with two type of identifiers. Pt scheduled for follow up visit 6/23/2020 at 8 Am. Pt verbalized understanding.

## 2020-05-15 ENCOUNTER — TELEPHONE (OUTPATIENT)
Dept: CARDIOLOGY CLINIC | Age: 80
End: 2020-05-15

## 2020-05-19 ENCOUNTER — HOSPITAL ENCOUNTER (INPATIENT)
Age: 80
LOS: 2 days | Discharge: HOME | DRG: 243 | End: 2020-05-21
Attending: EMERGENCY MEDICINE | Admitting: INTERNAL MEDICINE
Payer: MEDICARE

## 2020-05-19 DIAGNOSIS — R00.1 BRADYCARDIA: ICD-10-CM

## 2020-05-19 DIAGNOSIS — I47.20 V TACH: ICD-10-CM

## 2020-05-19 PROBLEM — I49.5 SICK SINUS SYNDROME (HCC): Status: ACTIVE | Noted: 2020-05-19

## 2020-05-19 PROBLEM — R55 SYNCOPE: Status: ACTIVE | Noted: 2020-05-19

## 2020-05-19 LAB
ALBUMIN SERPL-MCNC: 3.7 G/DL (ref 3.5–5)
ALBUMIN/GLOB SERPL: 1.2 {RATIO} (ref 1.1–2.2)
ALP SERPL-CCNC: 106 U/L (ref 45–117)
ALT SERPL-CCNC: 18 U/L (ref 12–78)
ANION GAP SERPL CALC-SCNC: 6 MMOL/L (ref 5–15)
AST SERPL-CCNC: 14 U/L (ref 15–37)
BASOPHILS # BLD: 0.1 K/UL (ref 0–0.1)
BASOPHILS NFR BLD: 1 % (ref 0–1)
BILIRUB SERPL-MCNC: 0.3 MG/DL (ref 0.2–1)
BUN SERPL-MCNC: 30 MG/DL (ref 6–20)
BUN/CREAT SERPL: 23 (ref 12–20)
CALCIUM SERPL-MCNC: 9.4 MG/DL (ref 8.5–10.1)
CHLORIDE SERPL-SCNC: 107 MMOL/L (ref 97–108)
CO2 SERPL-SCNC: 26 MMOL/L (ref 21–32)
COMMENT, HOLDF: NORMAL
CREAT SERPL-MCNC: 1.32 MG/DL (ref 0.55–1.02)
DIFFERENTIAL METHOD BLD: NORMAL
EOSINOPHIL # BLD: 0.2 K/UL (ref 0–0.4)
EOSINOPHIL NFR BLD: 3 % (ref 0–7)
ERYTHROCYTE [DISTWIDTH] IN BLOOD BY AUTOMATED COUNT: 13.4 % (ref 11.5–14.5)
GLOBULIN SER CALC-MCNC: 3 G/DL (ref 2–4)
GLUCOSE SERPL-MCNC: 138 MG/DL (ref 65–100)
HCT VFR BLD AUTO: 39.2 % (ref 35–47)
HGB BLD-MCNC: 12.7 G/DL (ref 11.5–16)
IMM GRANULOCYTES # BLD AUTO: 0 K/UL (ref 0–0.04)
IMM GRANULOCYTES NFR BLD AUTO: 0 % (ref 0–0.5)
LYMPHOCYTES # BLD: 2 K/UL (ref 0.8–3.5)
LYMPHOCYTES NFR BLD: 21 % (ref 12–49)
MAGNESIUM SERPL-MCNC: 2.2 MG/DL (ref 1.6–2.4)
MCH RBC QN AUTO: 28.6 PG (ref 26–34)
MCHC RBC AUTO-ENTMCNC: 32.4 G/DL (ref 30–36.5)
MCV RBC AUTO: 88.3 FL (ref 80–99)
MONOCYTES # BLD: 0.8 K/UL (ref 0–1)
MONOCYTES NFR BLD: 9 % (ref 5–13)
NEUTS SEG # BLD: 6.2 K/UL (ref 1.8–8)
NEUTS SEG NFR BLD: 66 % (ref 32–75)
NRBC # BLD: 0 K/UL (ref 0–0.01)
NRBC BLD-RTO: 0 PER 100 WBC
PLATELET # BLD AUTO: 286 K/UL (ref 150–400)
PMV BLD AUTO: 10.2 FL (ref 8.9–12.9)
POTASSIUM SERPL-SCNC: 4.2 MMOL/L (ref 3.5–5.1)
PROT SERPL-MCNC: 6.7 G/DL (ref 6.4–8.2)
RBC # BLD AUTO: 4.44 M/UL (ref 3.8–5.2)
SAMPLES BEING HELD,HOLD: NORMAL
SODIUM SERPL-SCNC: 139 MMOL/L (ref 136–145)
TROPONIN I SERPL-MCNC: <0.05 NG/ML
WBC # BLD AUTO: 9.3 K/UL (ref 3.6–11)

## 2020-05-19 PROCEDURE — 93005 ELECTROCARDIOGRAM TRACING: CPT

## 2020-05-19 PROCEDURE — 96374 THER/PROPH/DIAG INJ IV PUSH: CPT

## 2020-05-19 PROCEDURE — 74011250636 HC RX REV CODE- 250/636: Performed by: INTERNAL MEDICINE

## 2020-05-19 PROCEDURE — 96375 TX/PRO/DX INJ NEW DRUG ADDON: CPT

## 2020-05-19 PROCEDURE — 94761 N-INVAS EAR/PLS OXIMETRY MLT: CPT

## 2020-05-19 PROCEDURE — 83735 ASSAY OF MAGNESIUM: CPT

## 2020-05-19 PROCEDURE — 65620000000 HC RM CCU GENERAL

## 2020-05-19 PROCEDURE — 85025 COMPLETE CBC W/AUTO DIFF WBC: CPT

## 2020-05-19 PROCEDURE — 84484 ASSAY OF TROPONIN QUANT: CPT

## 2020-05-19 PROCEDURE — 99285 EMERGENCY DEPT VISIT HI MDM: CPT

## 2020-05-19 PROCEDURE — 74011250637 HC RX REV CODE- 250/637: Performed by: INTERNAL MEDICINE

## 2020-05-19 PROCEDURE — 36415 COLL VENOUS BLD VENIPUNCTURE: CPT

## 2020-05-19 PROCEDURE — 74011250636 HC RX REV CODE- 250/636: Performed by: EMERGENCY MEDICINE

## 2020-05-19 PROCEDURE — 80053 COMPREHEN METABOLIC PANEL: CPT

## 2020-05-19 RX ORDER — ASPIRIN 81 MG/1
81 TABLET ORAL DAILY
Status: DISCONTINUED | OUTPATIENT
Start: 2020-05-20 | End: 2020-05-21 | Stop reason: HOSPADM

## 2020-05-19 RX ORDER — ACETAMINOPHEN 325 MG/1
650 TABLET ORAL
Status: DISCONTINUED | OUTPATIENT
Start: 2020-05-19 | End: 2020-05-21 | Stop reason: HOSPADM

## 2020-05-19 RX ORDER — ATROPINE SULFATE 0.1 MG/ML
0.5 INJECTION INTRAVENOUS
Status: DISCONTINUED | OUTPATIENT
Start: 2020-05-19 | End: 2020-05-21 | Stop reason: HOSPADM

## 2020-05-19 RX ORDER — ROSUVASTATIN CALCIUM 10 MG/1
20 TABLET, COATED ORAL
Status: DISCONTINUED | OUTPATIENT
Start: 2020-05-19 | End: 2020-05-21 | Stop reason: HOSPADM

## 2020-05-19 RX ORDER — AMIODARONE HYDROCHLORIDE 150 MG/3ML
INJECTION, SOLUTION INTRAVENOUS
Status: COMPLETED | OUTPATIENT
Start: 2020-05-19 | End: 2020-05-19

## 2020-05-19 RX ORDER — AMIODARONE HYDROCHLORIDE 150 MG/3ML
INJECTION, SOLUTION INTRAVENOUS
Status: DISPENSED
Start: 2020-05-19 | End: 2020-05-20

## 2020-05-19 RX ORDER — SODIUM CHLORIDE 9 MG/ML
50 INJECTION, SOLUTION INTRAVENOUS CONTINUOUS
Status: DISCONTINUED | OUTPATIENT
Start: 2020-05-19 | End: 2020-05-21 | Stop reason: HOSPADM

## 2020-05-19 RX ORDER — ATROPINE SULFATE 0.1 MG/ML
INJECTION INTRAVENOUS
Status: COMPLETED | OUTPATIENT
Start: 2020-05-19 | End: 2020-05-19

## 2020-05-19 RX ADMIN — AMIODARONE HYDROCHLORIDE 150 MG: 50 INJECTION, SOLUTION INTRAVENOUS at 20:04

## 2020-05-19 RX ADMIN — ATROPINE SULFATE 0.5 MG: 0.1 INJECTION PARENTERAL at 20:00

## 2020-05-19 RX ADMIN — ATROPINE SULFATE 0.5 MG: 0.1 INJECTION PARENTERAL at 19:58

## 2020-05-19 RX ADMIN — SODIUM CHLORIDE 50 ML/HR: 900 INJECTION, SOLUTION INTRAVENOUS at 21:41

## 2020-05-19 RX ADMIN — ROSUVASTATIN 20 MG: 20 TABLET, FILM COATED ORAL at 22:02

## 2020-05-19 RX ADMIN — Medication 1 AMPULE: at 23:37

## 2020-05-19 NOTE — Clinical Note
Right radial clipped, prepped with ChloraPrep and draped. This note was copied from the mother's chart.     06/30/20 1215   Maternal Assessment   Breast Density full   Maternal Infant Feeding   Maternal Emotional State independent   Equipment Type   Breast Pump Type double electric, hospital grade   Breast Pump Flange Type hard   Breast Pump Flange Size 27 mm   Breast Pumping   Breast Pumping Interventions frequent pumping encouraged   Breast Pumping double electric breast pump utilized   Discharge education given. Patient received Secret Spacehony pump. Stated she pumped 250 ml in past 24 hours and is using the Maintain setting. Insulated transport bag given.  number on the board to call as needed.

## 2020-05-19 NOTE — Clinical Note
No specimen collected. Estimated Blood Loss: <30 mL. -dyspnea, poor air entry b/l, inc sputum, respiratory acidosis, increasing oxygen requirement and rescue inhaler use. s/p o/p oral steroid taper and 7 day course of biaxin without improvement  -s/p solumedrol and nebs in ER with improvement  -c/w solumedrol 40 q8  -hold further abx as no purulent sputum and already s/p 7 d of appropriate abx  -c/w symbicort, hold spiriva for acute exacerbation and start duoneb atc to avoid dual anticholinergic effects, can transition back to spiriva upon discharge.  - c/w theophylline, singulair  -check RVP  -Pulm eval in am  -bedside spirometry  -If pt decompensates, would start bipap and check abg, but for now appears clinically improved. -dyspnea, poor air entry b/l, inc sputum, respiratory acidosis, increasing oxygen requirement and rescue inhaler use. s/p o/p oral steroid taper and 7 day course of biaxin without improvement  -s/p solumedrol and nebs in ER with improvement  -c/w solumedrol 40 q8  -hold further abx as no purulent sputum and already s/p 7 d of appropriate abx  -c/w symbicort, hold spiriva for acute exacerbation and start duoneb atc to avoid dual anticholinergic effects, can transition back to spiriva upon discharge.  - c/w theophylline, singulair  -check RVP  -Pulm eval in am  -bedside spirometry  -If pt decompensates, would start bipap and check abg, but for now appears clinically improved.  -ddx also includes but not limited to PE given sinus tachy, increased hypoxia/dyspnea; however no obj s/s of dvt, and pt's clinical presentation more c/w copd exac at this time.  IF no improvement despite IV steroids would consider CTA to r/o PE.

## 2020-05-19 NOTE — Clinical Note
TRANSFER - OUT REPORT:     Verbal report given to: [facility] RN. Report consisted of patient's Situation, Background, Assessment and   Recommendations(SBAR). Opportunity for questions and clarification was provided. Patient transported with a Registered Nurse and [address] [place] [address] / [email or web address] [facility] [employer]. Patient transported to: CCU.

## 2020-05-20 ENCOUNTER — APPOINTMENT (OUTPATIENT)
Dept: GENERAL RADIOLOGY | Age: 80
DRG: 243 | End: 2020-05-20
Attending: INTERNAL MEDICINE
Payer: MEDICARE

## 2020-05-20 PROBLEM — I47.29 NSVT (NONSUSTAINED VENTRICULAR TACHYCARDIA): Status: ACTIVE | Noted: 2020-05-20

## 2020-05-20 PROBLEM — I44.2 AV BLOCK, 3RD DEGREE (HCC): Status: ACTIVE | Noted: 2020-05-20

## 2020-05-20 PROBLEM — Z95.0 S/P PLACEMENT OF CARDIAC PACEMAKER: Status: ACTIVE | Noted: 2020-05-20

## 2020-05-20 PROBLEM — Z98.890 S/P CARDIAC CATH: Status: ACTIVE | Noted: 2020-05-20

## 2020-05-20 LAB
ATRIAL RATE: 133 BPM
ATRIAL RATE: 30 BPM
ATRIAL RATE: 40 BPM
CALCULATED P AXIS, ECG09: 58 DEGREES
CALCULATED R AXIS, ECG10: -57 DEGREES
CALCULATED R AXIS, ECG10: -65 DEGREES
CALCULATED R AXIS, ECG10: -76 DEGREES
CALCULATED T AXIS, ECG11: 107 DEGREES
CALCULATED T AXIS, ECG11: 86 DEGREES
CALCULATED T AXIS, ECG11: 88 DEGREES
DIAGNOSIS, 93000: NORMAL
P-R INTERVAL, ECG05: 248 MS
Q-T INTERVAL, ECG07: 372 MS
Q-T INTERVAL, ECG07: 564 MS
Q-T INTERVAL, ECG07: 602 MS
QRS DURATION, ECG06: 130 MS
QRS DURATION, ECG06: 144 MS
QRS DURATION, ECG06: 154 MS
QTC CALCULATION (BEZET), ECG08: 398 MS
QTC CALCULATION (BEZET), ECG08: 551 MS
QTC CALCULATION (BEZET), ECG08: 559 MS
VENTRICULAR RATE, ECG03: 132 BPM
VENTRICULAR RATE, ECG03: 30 BPM
VENTRICULAR RATE, ECG03: 52 BPM

## 2020-05-20 PROCEDURE — 77030018673: Performed by: INTERNAL MEDICINE

## 2020-05-20 PROCEDURE — 77030019698 HC SYR ANGI MDLON MRTM -A: Performed by: INTERNAL MEDICINE

## 2020-05-20 PROCEDURE — 74011250636 HC RX REV CODE- 250/636: Performed by: INTERNAL MEDICINE

## 2020-05-20 PROCEDURE — C1893 INTRO/SHEATH, FIXED,NON-PEEL: HCPCS | Performed by: INTERNAL MEDICINE

## 2020-05-20 PROCEDURE — 74011250637 HC RX REV CODE- 250/637: Performed by: NURSE PRACTITIONER

## 2020-05-20 PROCEDURE — C1898 LEAD, PMKR, OTHER THAN TRANS: HCPCS | Performed by: INTERNAL MEDICINE

## 2020-05-20 PROCEDURE — 77030010221 HC SPLNT WR POS TELE -B: Performed by: INTERNAL MEDICINE

## 2020-05-20 PROCEDURE — 74011000250 HC RX REV CODE- 250: Performed by: INTERNAL MEDICINE

## 2020-05-20 PROCEDURE — 77030015766: Performed by: INTERNAL MEDICINE

## 2020-05-20 PROCEDURE — 77030030195 HC CATH ANGI DX PRF4 MRTM -A: Performed by: INTERNAL MEDICINE

## 2020-05-20 PROCEDURE — 74011250637 HC RX REV CODE- 250/637

## 2020-05-20 PROCEDURE — C1894 INTRO/SHEATH, NON-LASER: HCPCS | Performed by: INTERNAL MEDICINE

## 2020-05-20 PROCEDURE — 02H63JZ INSERTION OF PACEMAKER LEAD INTO RIGHT ATRIUM, PERCUTANEOUS APPROACH: ICD-10-PCS | Performed by: INTERNAL MEDICINE

## 2020-05-20 PROCEDURE — 74011250637 HC RX REV CODE- 250/637: Performed by: INTERNAL MEDICINE

## 2020-05-20 PROCEDURE — 77010033678 HC OXYGEN DAILY

## 2020-05-20 PROCEDURE — 93458 L HRT ARTERY/VENTRICLE ANGIO: CPT | Performed by: INTERNAL MEDICINE

## 2020-05-20 PROCEDURE — B2151ZZ FLUOROSCOPY OF LEFT HEART USING LOW OSMOLAR CONTRAST: ICD-10-PCS | Performed by: INTERNAL MEDICINE

## 2020-05-20 PROCEDURE — 33208 INSRT HEART PM ATRIAL & VENT: CPT | Performed by: INTERNAL MEDICINE

## 2020-05-20 PROCEDURE — 77030002996 HC SUT SLK J&J -A: Performed by: INTERNAL MEDICINE

## 2020-05-20 PROCEDURE — 0JH606Z INSERTION OF PACEMAKER, DUAL CHAMBER INTO CHEST SUBCUTANEOUS TISSUE AND FASCIA, OPEN APPROACH: ICD-10-PCS | Performed by: INTERNAL MEDICINE

## 2020-05-20 PROCEDURE — 99153 MOD SED SAME PHYS/QHP EA: CPT | Performed by: INTERNAL MEDICINE

## 2020-05-20 PROCEDURE — 02HK3JZ INSERTION OF PACEMAKER LEAD INTO RIGHT VENTRICLE, PERCUTANEOUS APPROACH: ICD-10-PCS | Performed by: INTERNAL MEDICINE

## 2020-05-20 PROCEDURE — 71045 X-RAY EXAM CHEST 1 VIEW: CPT

## 2020-05-20 PROCEDURE — B2111ZZ FLUOROSCOPY OF MULTIPLE CORONARY ARTERIES USING LOW OSMOLAR CONTRAST: ICD-10-PCS | Performed by: INTERNAL MEDICINE

## 2020-05-20 PROCEDURE — 33213 INSERT PULSE GEN DUAL LEADS: CPT | Performed by: INTERNAL MEDICINE

## 2020-05-20 PROCEDURE — 77030004549 HC CATH ANGI DX PRF MRTM -A: Performed by: INTERNAL MEDICINE

## 2020-05-20 PROCEDURE — 99152 MOD SED SAME PHYS/QHP 5/>YRS: CPT | Performed by: INTERNAL MEDICINE

## 2020-05-20 PROCEDURE — C1785 PMKR, DUAL, RATE-RESP: HCPCS | Performed by: INTERNAL MEDICINE

## 2020-05-20 PROCEDURE — 77030008543 HC TBNG MON PRSS MRTM -A: Performed by: INTERNAL MEDICINE

## 2020-05-20 PROCEDURE — 74011636320 HC RX REV CODE- 636/320: Performed by: INTERNAL MEDICINE

## 2020-05-20 PROCEDURE — C1769 GUIDE WIRE: HCPCS | Performed by: INTERNAL MEDICINE

## 2020-05-20 PROCEDURE — 77030040361 HC SLV COMPR DVT MDII -B

## 2020-05-20 PROCEDURE — 77030037713 HC CLOSR DEV INCIS ZIP STRY -B: Performed by: INTERNAL MEDICINE

## 2020-05-20 PROCEDURE — 65660000000 HC RM CCU STEPDOWN

## 2020-05-20 PROCEDURE — 77030031139 HC SUT VCRL2 J&J -A: Performed by: INTERNAL MEDICINE

## 2020-05-20 PROCEDURE — 77030019569 HC BND COMPR RAD TERU -B: Performed by: INTERNAL MEDICINE

## 2020-05-20 PROCEDURE — 4A023N7 MEASUREMENT OF CARDIAC SAMPLING AND PRESSURE, LEFT HEART, PERCUTANEOUS APPROACH: ICD-10-PCS | Performed by: INTERNAL MEDICINE

## 2020-05-20 DEVICE — LEAD PCMKR CAPSUR FIX NOVUS 52 --: Type: IMPLANTABLE DEVICE | Status: FUNCTIONAL

## 2020-05-20 DEVICE — LEAD PCMKR CAPSUR FIX NOVUS 58 --: Type: IMPLANTABLE DEVICE | Status: FUNCTIONAL

## 2020-05-20 DEVICE — PCMKR AZURE XT DR MRI --: Type: IMPLANTABLE DEVICE | Status: FUNCTIONAL

## 2020-05-20 RX ORDER — CETIRIZINE HCL 10 MG
10 TABLET ORAL DAILY
Status: DISCONTINUED | OUTPATIENT
Start: 2020-05-21 | End: 2020-05-21 | Stop reason: HOSPADM

## 2020-05-20 RX ORDER — LIDOCAINE HYDROCHLORIDE 10 MG/ML
INJECTION, SOLUTION EPIDURAL; INFILTRATION; INTRACAUDAL; PERINEURAL AS NEEDED
Status: DISCONTINUED | OUTPATIENT
Start: 2020-05-20 | End: 2020-05-20

## 2020-05-20 RX ORDER — METOPROLOL SUCCINATE 25 MG/1
12.5 TABLET, EXTENDED RELEASE ORAL
Status: DISCONTINUED | OUTPATIENT
Start: 2020-05-20 | End: 2020-05-21 | Stop reason: HOSPADM

## 2020-05-20 RX ORDER — LORAZEPAM 0.5 MG/1
.5-1 TABLET ORAL
Status: DISCONTINUED | OUTPATIENT
Start: 2020-05-20 | End: 2020-05-20

## 2020-05-20 RX ORDER — HEPARIN SODIUM 200 [USP'U]/100ML
INJECTION, SOLUTION INTRAVENOUS
Status: DISCONTINUED | OUTPATIENT
Start: 2020-05-20 | End: 2020-05-20

## 2020-05-20 RX ORDER — BACITRACIN 50000 [IU]/1
INJECTION, POWDER, FOR SOLUTION INTRAMUSCULAR AS NEEDED
Status: DISCONTINUED | OUTPATIENT
Start: 2020-05-20 | End: 2020-05-20

## 2020-05-20 RX ORDER — MIDAZOLAM HYDROCHLORIDE 1 MG/ML
INJECTION, SOLUTION INTRAMUSCULAR; INTRAVENOUS AS NEEDED
Status: DISCONTINUED | OUTPATIENT
Start: 2020-05-20 | End: 2020-05-20

## 2020-05-20 RX ORDER — MELATONIN
1000 DAILY
Status: DISCONTINUED | OUTPATIENT
Start: 2020-05-21 | End: 2020-05-21 | Stop reason: HOSPADM

## 2020-05-20 RX ORDER — RANOLAZINE 500 MG/1
500 TABLET, EXTENDED RELEASE ORAL 2 TIMES DAILY
Status: DISCONTINUED | OUTPATIENT
Start: 2020-05-20 | End: 2020-05-21 | Stop reason: HOSPADM

## 2020-05-20 RX ORDER — VERAPAMIL HYDROCHLORIDE 2.5 MG/ML
INJECTION, SOLUTION INTRAVENOUS AS NEEDED
Status: DISCONTINUED | OUTPATIENT
Start: 2020-05-20 | End: 2020-05-20

## 2020-05-20 RX ORDER — LIDOCAINE HYDROCHLORIDE 10 MG/ML
INJECTION INFILTRATION; PERINEURAL AS NEEDED
Status: DISCONTINUED | OUTPATIENT
Start: 2020-05-20 | End: 2020-05-20

## 2020-05-20 RX ORDER — FENTANYL CITRATE 50 UG/ML
INJECTION, SOLUTION INTRAMUSCULAR; INTRAVENOUS AS NEEDED
Status: DISCONTINUED | OUTPATIENT
Start: 2020-05-20 | End: 2020-05-20

## 2020-05-20 RX ORDER — LORAZEPAM 0.5 MG/1
0.5 TABLET ORAL
Status: DISCONTINUED | OUTPATIENT
Start: 2020-05-20 | End: 2020-05-21 | Stop reason: HOSPADM

## 2020-05-20 RX ORDER — LORAZEPAM 1 MG/1
TABLET ORAL
Status: COMPLETED
Start: 2020-05-20 | End: 2020-05-20

## 2020-05-20 RX ORDER — PANTOPRAZOLE SODIUM 40 MG/1
40 TABLET, DELAYED RELEASE ORAL DAILY
Status: DISCONTINUED | OUTPATIENT
Start: 2020-05-20 | End: 2020-05-21 | Stop reason: HOSPADM

## 2020-05-20 RX ORDER — HEPARIN SODIUM 1000 [USP'U]/ML
INJECTION, SOLUTION INTRAVENOUS; SUBCUTANEOUS AS NEEDED
Status: DISCONTINUED | OUTPATIENT
Start: 2020-05-20 | End: 2020-05-20

## 2020-05-20 RX ADMIN — METOPROLOL SUCCINATE 12.5 MG: 25 TABLET, EXTENDED RELEASE ORAL at 21:35

## 2020-05-20 RX ADMIN — LORAZEPAM 1 MG: 1 TABLET ORAL at 01:15

## 2020-05-20 RX ADMIN — Medication 1 AMPULE: at 08:07

## 2020-05-20 RX ADMIN — RANOLAZINE 500 MG: 500 TABLET, FILM COATED, EXTENDED RELEASE ORAL at 21:35

## 2020-05-20 RX ADMIN — ACETAMINOPHEN 650 MG: 325 TABLET ORAL at 21:40

## 2020-05-20 RX ADMIN — ROSUVASTATIN 20 MG: 20 TABLET, FILM COATED ORAL at 21:35

## 2020-05-20 RX ADMIN — SODIUM CHLORIDE 50 ML/HR: 900 INJECTION, SOLUTION INTRAVENOUS at 13:15

## 2020-05-20 RX ADMIN — PANTOPRAZOLE SODIUM 40 MG: 40 TABLET, DELAYED RELEASE ORAL at 14:38

## 2020-05-20 RX ADMIN — ASPIRIN 81 MG: 81 TABLET ORAL at 08:07

## 2020-05-20 NOTE — PROGRESS NOTES
Problem: Pressure Injury - Risk of  Goal: *Prevention of pressure injury  Description: Document Harley Scale and appropriate interventions in the flowsheet. Outcome: Progressing Towards Goal  Note: Pressure Injury Interventions:  Sensory Interventions: Assess changes in LOC, Float heels, Minimize linen layers    Moisture Interventions: Minimize layers    Activity Interventions: Increase time out of bed, Pressure redistribution bed/mattress(bed type), PT/OT evaluation    Mobility Interventions: Assess need for specialty bed, Float heels, HOB 30 degrees or less, Pressure redistribution bed/mattress (bed type), PT/OT evaluation, Turn and reposition approx.  every two hours(pillow and wedges)    Nutrition Interventions: Document food/fluid/supplement intake, Discuss nutritional consult with provider, Offer support with meals,snacks and hydration    Friction and Shear Interventions: Apply protective barrier, creams and emollients, Feet elevated on foot rest, Foam dressings/transparent film/skin sealants, HOB 30 degrees or less, Lift team/patient mobility team, Minimize layers

## 2020-05-20 NOTE — ROUTINE PROCESS
The following appointments have been successfully scheduled: 
 
Date/time Tuesday, May 26, 2020 10:30 AM 
Patient  Jama Hidalgo 1940 ([facility] [id number] [phone] Department [facility] OFFICE Appointment type Virtual Visit Special Case 30 Provider Nunu Rodriguez Appointment type notes Virtual Visit Special Case 30 
for visits related to COVID

## 2020-05-20 NOTE — PROGRESS NOTES
Chart reviewed, patient examined. Well known to me. Admitted with high degree AV block, VT, syncope. Known Moderate  CAD. Previous stress test, LV gram showed normal EF. Plan was to have an implantable monitor if recurrent syncope. Recommend cath/PCI. If no significant CAD ? EPS/pacemaker. Full consult to follow.

## 2020-05-20 NOTE — PROGRESS NOTES
5/20/2020    INTENSIVIST PROGRESS NOTE:     Patient seen and evaluated, chart reviewed. Admitted overnight with high degree AVB and bradycardia with presyncope. This has been a longstanding problem (syncope) that has been difficult to diagnose. Currently in NSR and asymptomatic. Denies current complaint. Awaiting cardiology evaluation.      Visit Vitals  /79   Pulse 60   Temp 98.3 °F (36.8 °C)   Resp 16   Ht 5' 1\" (1.549 m)   Wt 83.2 kg (183 lb 6.8 oz)   SpO2 96%   BMI 34.66 kg/m²       General: alert, NAD  Eyes: anicteric  HEENT: NC/AT  CV: RRR  Lungs: CTA B  Abd: soft, +BS  Ext: no cyanosis, no clubbing  Skin: warm and dry  Musculoskeletal: no gross deformities  Neuro: nonfocal    Labs reviewed    A/P:  - symptomatic bradycardia and CHB alternating with VT - cardiology evaluation pending, will likely need PPM, continue monitoring for now, use temp pacer if needed  - chronic LBBB  - CAD  - DVT prophylaxis  Will assist on disposition planning when stable for transfer  Brian Ledesma MD

## 2020-05-20 NOTE — H&P
Hospitalist Admission Note    NAME: Chevy Medina   :  1940   MRN:  [id number]     Date/Time:  2020 8:52 PM    Patient PCP: Anurag Elliott MD  _____________________________________________________________________  Given the patient's current clinical presentation, I have a high level of concern for decompensation if discharged from the emergency department. Complex decision making was performed, which includes reviewing the patient's available past medical records, laboratory results, and x-ray films. My assessment of this patient's clinical condition and my plan of care is as follows. Assessment / Plan:    Symptomatic bradycardia / high degree AVB   Ventricular tachycardia  Presyncope / syncope  Chronic LBBB  CAD  HLD  -admit to ICU for close monitoring as patient is high risk for recurrent life threatening arrhythmia   -external pacers on standby to keep HR >60  -atropine 0.5mg IV prn for symptomatic bradycardia or HR <50  -s/p amiodarone 150mg bolus in ER  -start amiodarone infusion if patient develops further episodes of Vtach  -continue ASA and statin  -hold metoprolol and ranexa  -cardiology / EP evaluation for PPM or AICD       Code Status:  Full  Surrogate Decision Maker:  Daughter Marga Lomeli (ICU nurse)    DVT Prophylaxis:  SCD    Baseline: . Lives alone. Ambulates independently           Subjective:   CHIEF COMPLAINT: Dizziness / near syncope    HISTORY OF PRESENT ILLNESS:     Donya Newberry is a 67 y.o.  female with a past history of coronary artery disease, left bundle branch block and syncope who presents after another near syncopal episode. Patient has had a history of intermittent lightheadedness and syncope and has had an extensive cardiac work-up that included stress testing, echo and a 30-day event monitor that was unremarkable. Today she was sitting on her couch when she started to experience another 'spell' coming on.   She went to get some ice, wrapped it with a towel and placed it on her neck. She called her daughter who came over and subsequently called for EMS. EMS found her heart rate to be in the 20s and the patient received atropine x2 and IV fluid bolus. En route to the ER runs of FREDA easley was noted. In the ER she was in first-degree AV block but subsequently her heart rate dropped down to the 30s and she was given more atropine. At some point she again went into ventricular tachycardia and was given IV amiodarone 150 mg bolus and external pacers were applied. Cardiology was consulted by phone. Admission was recommended and the hospitalist were asked to admit for work up and evaluation of the above problems. Past Medical History:   Diagnosis Date    GERD (gastroesophageal reflux disease) 2010    Hypertriglyceridemia 2010    Hypovitaminosis D     OA (osteoarthritis) 2010    Osteoarthritis     Vitamin D deficiency 2010        Past Surgical History:   Procedure Laterality Date    HX ACL RECONSTRUCTION      HX CATARACT REMOVAL  2018    right eye    HX HEENT      cervical neck stenosis s/p cervical release    HX HERNIA REPAIR  6394    umbilical    HX HIP REPLACEMENT  2007    right    HX HIP REPLACEMENT  2007    left    HX ORTHOPAEDIC  2008    cervical release    [place] COLONOSCOPY FLX DX W/COLLJ SPEC WHEN PFRMD  2006    [place] COLONOSCOPY FLX DX W/COLLJ SPEC WHEN PFRMD  2011    Dr. Loc Hernandez       Social History     Tobacco Use    Smoking status: Former Smoker     Packs/day: 0.25     Years: 55.00     Pack years: 13.75     Last attempt to quit: 2019     Years since quittin.5    Smokeless tobacco: Never Used   Substance Use Topics    Alcohol use:  Yes     Alcohol/week: 0.0 standard drinks     Comment: 1 drink a month        Family History   Problem Relation Age of Onset    Hypertension Mother     Heart Failure Mother     Cancer Father         colon    Lung Disease Brother     Arthritis-rheumatoid Brother     Heart Disease Brother      Allergies   Allergen Reactions    Macrobid [Nitrofurantoin Monohyd/M-Cryst] Rash    Pcn [Penicillins] Hives        Prior to Admission medications    Medication Sig Start Date End Date Taking? Authorizing Provider   metoprolol succinate (TOPROL-XL) 25 mg XL tablet Take 0.5 Tabs by mouth nightly. 12/30/19   Aura Schultz NP   rosuvastatin (CRESTOR) 20 mg tablet Take 1 Tab by mouth nightly. 12/30/19   Aurakeith Schultz NP   ranolazine ER (RANEXA) 500 mg SR tablet Take 1 Tab by mouth two (2) times a day. 12/30/19   [place]keith Schultz NP   aspirin delayed-release 81 mg tablet Take 1 Tab by mouth daily. 12/9/19   Ky Kirk MD   LORazepam (ATIVAN) 1 mg tablet Take 0.5-1 Tabs by mouth every eight (8) hours as needed for Anxiety. 10/2/19   Guerrero Iverson MD   pantoprazole (PROTONIX) 40 mg tablet Take 1 Tab by mouth daily. 10/2/19   Lucina Jasso MD   ibuprofen (MOTRIN) 200 mg tablet Take 500 mg by mouth every six (6) hours as needed for Pain. Provider, Historical   cetirizine (ZYRTEC) 10 mg tablet Take 10 mg by mouth daily. Provider, Historical   Cholecalciferol, Vitamin D3, (VITAMIN D3) 1,000 unit Cap Take 1 Cap by mouth daily.       Provider, Historical       REVIEW OF SYSTEMS:       Total of 12 systems reviewed as follows:       POSITIVE= underlined text  Negative = text not underlined  General:  fever, chills, sweats, generalized weakness, weight loss/gain,      loss of appetite   Eyes:    blurred vision, eye pain, loss of vision, double vision  ENT:    rhinorrhea, pharyngitis   Respiratory:   cough, sputum production, SOB, MALCOLM, wheezing, pleuritic pain   Cardiology:   chest pain, palpitations, orthopnea, PND, edema, syncope   Gastrointestinal:  abdominal pain , N/V, diarrhea, dysphagia, constipation, bleeding   Genitourinary:  frequency, urgency, dysuria, hematuria, incontinence   Muskuloskeletal : arthralgia, myalgia, back pain  Hematology:  easy bruising, nose or gum bleeding, lymphadenopathy   Dermatological: rash, ulceration, pruritis, color change / jaundice  Endocrine:   hot flashes or polydipsia   Neurological:  headache, dizziness, confusion, focal weakness, paresthesia,     Speech difficulties, memory loss, gait difficulty  Psychological: Feelings of anxiety, depression, agitation    Objective:   VITALS:    Visit Vitals  /52   Pulse (!) 110   Temp 98.1 °F (36.7 °C)   Resp 24   Ht 5' 1\" (1.549 m)   Wt 83.2 kg (183 lb 6.8 oz)   SpO2 100%   BMI 34.66 kg/m²       PHYSICAL EXAM:    General:    Alert, cooperative, no distress, appears stated age. HEENT: Atraumatic, anicteric sclerae, pink conjunctivae     No oral ulcers, mucosa moist, throat clear, dentition fair  Neck:  Supple, symmetrical,  thyroid: non tender  Lungs:   Clear to auscultation bilaterally. No Wheezing or Rhonchi. No rales. Chest wall:  No tenderness  No Accessory muscle use. Heart:   Regular  rhythm,  No  murmur   No edema  Abdomen:   Soft, non-tender. Not distended. Bowel sounds normal  Extremities: No cyanosis. No clubbing,  Skin turgor normal, Capillary refill normal, Radial dial pulse 2+  Skin:     Not pale. Not Jaundiced  No rashes   Psych:  Good insight. Not depressed. Not anxious or agitated. Neurologic: EOMs intact. No facial asymmetry. No aphasia or slurred speech. Symmetrical strength, Sensation grossly intact.  Alert and oriented X 4.     _______________________________________________________________________  Care Plan discussed with:    Comments   Patient x    Family  x    RN x    Care Manager                    Consultant:      _______________________________________________________________________  Expected  Disposition:   Home with Family x   HH/PT/OT/RN    SNF/LTC    LISA    ________________________________________________________________________  TOTAL TIME:    Minutes    Critical Care Provided   54 Minutes non procedure based  I have provided    55    minutes of critical care time. During this entire length of time I was immediately available to the patient. The reason for providing this level of medical care was due to a critical illness that impaired one or more vital organ systems, such that there was a high probability of imminent or life threatening deterioration in the patient's condition. This care involved high complexity decision making which includes reviewing the patient's past medical records, current laboratory results, and actual Xray films in order to assess, support vital system function, and to treat this degree of vital organ system failure, and to prevent further life threatening deterioration of the patients condition. I have also discussed this case with the involved ED physician       Comments     Reviewed previous records   >50% of visit spent in counseling and coordination of care  Discussion with patient and/or family and questions answered       Given the patient's current clinical presentation, I have a high level of concern for decompensation if discharged from the ED. Complex decision making was performed which includes reviewing the patient's available past medical records, laboratory results, and Xray films. I have also directly communicated my plan and discussed this case with the involved ED physician.     ____________________________________________________________________  Carlos Rouse MD    Procedures: see electronic medical records for all procedures/Xrays and details which were not copied into this note but were reviewed prior to creation of Plan.     LAB DATA REVIEWED:    Recent Results (from the past 24 hour(s))   CBC WITH AUTOMATED DIFF    Collection Time: 05/19/20  8:04 PM   Result Value Ref Range    WBC 9.3 3.6 - 11.0 K/uL    RBC 4.44 3.80 - 5.20 M/uL    HGB 12.7 11.5 - 16.0 g/dL    HCT 39.2 35.0 - 47.0 %    MCV 88.3 80.0 - 99.0 FL    MCH 28.6 26.0 - 34.0 PG    MCHC 32.4 30.0 - 36.5 g/dL    RDW 13.4 11.5 - 14.5 %    PLATELET 960 099 - 065 K/uL    MPV 10.2 8.9 - 12.9 FL    NRBC 0.0 0  WBC    ABSOLUTE NRBC 0.00 0.00 - 0.01 K/uL    NEUTROPHILS 66 32 - 75 %    LYMPHOCYTES 21 12 - 49 %    MONOCYTES 9 5 - 13 %    EOSINOPHILS 3 0 - 7 %    BASOPHILS 1 0 - 1 %    IMMATURE GRANULOCYTES 0 0.0 - 0.5 %    ABS. NEUTROPHILS 6.2 1.8 - 8.0 K/UL    ABS. LYMPHOCYTES 2.0 0.8 - 3.5 K/UL    ABS. MONOCYTES 0.8 0.0 - 1.0 K/UL    ABS. EOSINOPHILS 0.2 0.0 - 0.4 K/UL    ABS. BASOPHILS 0.1 0.0 - 0.1 K/UL    ABS. IMM. GRANS. 0.0 0.00 - 0.04 K/UL    DF AUTOMATED     METABOLIC PANEL, COMPREHENSIVE    Collection Time: 05/19/20  8:04 PM   Result Value Ref Range    Sodium 139 136 - 145 mmol/L    Potassium 4.2 3.5 - 5.1 mmol/L    Chloride 107 97 - 108 mmol/L    CO2 26 21 - 32 mmol/L    Anion gap 6 5 - 15 mmol/L    Glucose 138 (H) 65 - 100 mg/dL    BUN 30 (H) 6 - 20 MG/DL    Creatinine 1.32 (H) 0.55 - 1.02 MG/DL    BUN/Creatinine ratio 23 (H) 12 - 20      GFR est AA 47 (L) >60 ml/min/1.73m2    GFR est non-AA 39 (L) >60 ml/min/1.73m2    Calcium 9.4 8.5 - 10.1 MG/DL    Bilirubin, total 0.3 0.2 - 1.0 MG/DL    ALT (SGPT) 18 12 - 78 U/L    AST (SGOT) 14 (L) 15 - 37 U/L    Alk. phosphatase 106 45 - 117 U/L    Protein, total 6.7 6.4 - 8.2 g/dL    Albumin 3.7 3.5 - 5.0 g/dL    Globulin 3.0 2.0 - 4.0 g/dL    A-G Ratio 1.2 1.1 - 2.2     TROPONIN I    Collection Time: 05/19/20  8:04 PM   Result Value Ref Range    Troponin-I, Qt. <0.05 <0.05 ng/mL   MAGNESIUM    Collection Time: 05/19/20  8:04 PM   Result Value Ref Range    Magnesium 2.2 1.6 - 2.4 mg/dL   SAMPLES BEING HELD    Collection Time: 05/19/20  8:04 PM   Result Value Ref Range    SAMPLES BEING HELD RED,GOLD,GREN     COMMENT        Add-on orders for these samples will be processed based on acceptable specimen integrity and analyte stability, which may vary by analyte.

## 2020-05-20 NOTE — CONSULTS
Subjective:                  [address] [facility], [address], [address] [facility]  [phone]    Date of  Admission: 5/19/2020  7:39 PM     Admission type:Emergency    Day Gooden is a 67 y.o. female admitted for Sick sinus syndrome ([address] [I49.5]; Syncope [R55]. C/o dizziness and near syncope. Ems called and pt noted to be in chb with ventricular escape in the 20s. Admitted and noted to have nsvt. Cath showed no change in cad with nl lvef. Has had syncope in the past with normal event monitor. Asked to eval for chb and near syncope.  Denies cp/sob      Patient Active Problem List    Diagnosis Date Noted    NSVT (nonsustained ventricular tachycardia) ([address] 05/20/2020    AV block, 3rd degree ([address] 05/20/2020    Sick sinus syndrome ([address] 05/19/2020    Syncope 05/19/2020    IGT (impaired glucose tolerance) 03/28/2017    Encounter for medication monitoring 09/28/2016    Environmental allergies 09/21/2011    Anxiety 03/24/2011    Hypertriglyceridemia 02/23/2010    GERD (gastroesophageal reflux disease) 02/23/2010    OA (osteoarthritis) 02/23/2010    Vitamin D deficiency 02/23/2010      Agatha Crespo MD  Past Medical History:   Diagnosis Date    AV block, 3rd degree ([address] 5/20/2020    GERD (gastroesophageal reflux disease) 2/23/2010    Hypertriglyceridemia 2/23/2010    Hypovitaminosis D     NSVT (nonsustained ventricular tachycardia) ([address] 5/20/2020    OA (osteoarthritis) 2/23/2010    Osteoarthritis     Vitamin D deficiency 2/23/2010      Past Surgical History:   Procedure Laterality Date    HX ACL RECONSTRUCTION  1998    HX CATARACT REMOVAL  06/2018    right eye    HX HEENT  12/08    cervical neck stenosis s/p cervical release    HX HERNIA REPAIR  6716    umbilical    HX HIP REPLACEMENT  6/2007    right    HX HIP REPLACEMENT  12/2007    left    HX ORTHOPAEDIC  12/2008    cervical release    [place] COLONOSCOPY FLX DX W/COLLJ SPEC WHEN PFRMD  06/2006    MN COLONOSCOPY FLX DX W/COLLJ [facility] WHEN PFRMD  2011    Dr. Roger Forte [Nitrofurantoin Monohyd/M-Cryst] Rash    Pcn [Penicillins] Hives     Social History     Tobacco Use    Smoking status: Former Smoker     Packs/day: 0.25     Years: 55.00     Pack years: 13.75     Last attempt to quit: 2019     Years since quittin.5    Smokeless tobacco: Never Used   Substance Use Topics    Alcohol use:  Yes     Alcohol/week: 0.0 standard drinks     Comment: 1 drink a month    Drug use: No     Family History   Problem Relation Age of Onset    Hypertension Mother     Heart Failure Mother     Cancer Father         colon    Lung Disease Brother     Arthritis-rheumatoid Brother     Heart Disease Brother       Current Facility-Administered Medications   Medication Dose Route Frequency    LORazepam (ATIVAN) tablet 0.5 mg  0.5 mg Oral Q8H PRN    fentaNYL citrate (PF) injection    PRN    midazolam (VERSED) injection    PRN    lidocaine (XYLOCAINE) 10 mg/mL (1 %) injection    PRN    bacitracin injection    PRN    0.9% sodium chloride infusion  50 mL/hr IntraVENous CONTINUOUS    acetaminophen (TYLENOL) tablet 650 mg  650 mg Oral Q4H PRN    atropine injection 0.5 mg  0.5 mg IntraVENous Q2.5H PRN    aspirin delayed-release tablet 81 mg  81 mg Oral DAILY    rosuvastatin (CRESTOR) tablet 20 mg  20 mg Oral QHS    alcohol 62% (NOZIN) nasal  1 Ampule  1 Ampule Topical Q12H         Review of Symptoms:  Constitutional: negative  Eyes: negative  Ears, nose, mouth, throat, and face: negative  Respiratory: negative  Cardiovascular: near syncope  Gastrointestinal: negative  Genitourinary: negative  Musculoskeletal: negative  Neurological: negative  Behvioral/Psych: negative  Endocrine: negative     Subjective:      Visit Vitals  /73 (BP 1 Location: Right arm, BP Patient Position: At rest)   Pulse 60   Temp 98 °F (36.7 °C)   Resp 17   Ht 5' 1\" (1.549 m)   Wt 183 lb 6.8 oz (83.2 kg)   SpO2 95%   BMI 34.66 kg/m²       Physical Exam  Abdomen: soft, non-tender. Extremities: extremities normal  Heart: regular rate and rhythm  Lungs: clear to auscultation bilaterally  Pulses: 2+ and symmetric    Cardiographics    ekg: nsr, chb with v escape    Echocardiogram: nl lvef    Labs:  Recent Labs     05/19/20 2004   WBC 9.3   HGB 12.7   HCT 39.2        Recent Labs     05/19/20 2004      K 4.2      CO2 26   *   BUN 30*   CREA 1.32*   CA 9.4   MG 2.2   ALB 3.7   TBILI 0.3   SGOT 14*   ALT 18       Recent Labs     05/19/20 2004   TROIQ <0.05         Intake/Output Summary (Last 24 hours) at 5/20/2020 1031  Last data filed at 5/20/2020 0800  Gross per 24 hour   Intake 615.84 ml   Output 1500 ml   Net -884.16 ml         Assessment:     Assessment:       Active Problems:    Sick sinus syndrome ([address] (5/19/2020)      Syncope (5/19/2020)      NSVT (nonsustained ventricular tachycardia) ([address] (5/20/2020)      AV block, 3rd degree ([address] (5/20/2020)         Plan:     Dillon Sin is a pleasant lady with near syncope and demonstrated chb with ventricular escape. She is a candidate for a dual chamber ppm. I discussed the risks/benefits/alternatives of the procedure with the patient. Risks include (but are not limited to) bleeding, heart block, infection, cva/mi/tamponade/death. The patient understands and agrees to proceed. Thank you for this interesting consultation.         Jagdish Haskins MD, [facility]    5/20/2020

## 2020-05-20 NOTE — PROGRESS NOTES
1300 - Bedside verbal report received from off going shift. Placed sling to left arm with ice pack to pacemaker site. Denies CP/SOB/dizziness. HR = SR 60-65. Re-positioned for comfort, call bell within reach. 1345 - C. Priscila Shoulders NP at bedside, updated on condition, orders pending. TRANSFER - OUT REPORT:    Verbal report given to receiving nurse Vin(name) on Mardi Hidden  being transferred to IVCU(unit) for routine progression of care       Report consisted of patients Situation, Background, Assessment and   Recommendations(SBAR). Information from the following report(s) SBAR, Kardex, Procedure Summary, Intake/Output, MAR, Cardiac Rhythm SR and Quality Measures was reviewed with the receiving nurse. Lines:   Peripheral IV 05/19/20 Left Antecubital (Active)   Site Assessment Clean, dry, & intact 5/20/2020  1:00 PM   Phlebitis Assessment 0 5/20/2020  1:00 PM   Infiltration Assessment 0 5/20/2020  1:00 PM   Dressing Status Clean, dry, & intact 5/20/2020  1:00 PM   Dressing Type Tape;Transparent 5/20/2020  1:00 PM   Hub Color/Line Status Green;Capped 5/20/2020  1:00 PM   Action Taken Blood drawn 5/19/2020  8:11 PM   Alcohol Cap Used Yes 5/20/2020  1:00 PM       Peripheral IV 05/19/20 Right Hand (Active)   Site Assessment Clean, dry, & intact 5/20/2020  1:00 PM   Phlebitis Assessment 0 5/20/2020  1:00 PM   Infiltration Assessment 0 5/20/2020  1:00 PM   Dressing Status Clean, dry, & intact 5/20/2020  1:00 PM   Dressing Type Tape;Transparent 5/20/2020  1:00 PM   Hub Color/Line Status Pink; Infusing 5/20/2020  1:00 PM   Action Taken Open ports on tubing capped 5/20/2020  4:36 AM   Alcohol Cap Used Yes 5/20/2020  1:00 PM        Opportunity for questions and clarification was provided.       Patient transported with:   Monitor  Registered Nurse  Tech

## 2020-05-20 NOTE — ED NOTES
TRANSFER - OUT REPORT:    Verbal report given to Lily RN (name) on Mian Juarez  being transferred to CCU (unit) for routine progression of care       Report consisted of patients Situation, Background, Assessment and   Recommendations(SBAR). Information from the following report(s) SBAR, Kardex, ED Summary, MAR, Recent Results and Cardiac Rhythm NSR with LBBB was reviewed with the receiving nurse. Lines:   Peripheral IV 05/19/20 Left Antecubital (Active)   Site Assessment Clean, dry, & intact 5/19/2020  8:11 PM   Phlebitis Assessment 0 5/19/2020  8:11 PM   Infiltration Assessment 0 5/19/2020  8:11 PM   Dressing Status Clean, dry, & intact 5/19/2020  8:11 PM   Dressing Type Tape;Transparent 5/19/2020  8:11 PM   Hub Color/Line Status Green;Flushed;Patent 5/19/2020  8:11 PM   Action Taken Blood drawn 5/19/2020  8:11 PM       Peripheral IV 05/19/20 Right Hand (Active)   Site Assessment Clean, dry, & intact 5/19/2020  8:12 PM   Phlebitis Assessment 0 5/19/2020  8:12 PM   Infiltration Assessment 0 5/19/2020  8:12 PM   Dressing Status Clean, dry, & intact 5/19/2020  8:12 PM   Dressing Type Tape;Transparent 5/19/2020  8:12 PM   Hub Color/Line Status Pink;Flushed;Patent 5/19/2020  8:12 PM        Opportunity for questions and clarification was provided.       Patient transported with:   Monitor  O2 @ 2 liters   CCU RNs x2

## 2020-05-20 NOTE — ED NOTES
Patient alert and color pink on arrival. Radial pulses noted. Respirations not labored. Placed on pacer pads on arrival. Emergency equipment on standby. Patient placed on oxygen 2 liters by capnography nasal cannula 2 liters for heart rate 38.    1945  EKG completed and given to Dr. Annia Bassett. 1947  Patient started complaining \"I'm getting that feeling again, it's happening again. \"  Denied chest pain or shortness of breath. Complained of dizziness and nausea. Heart rate dropped from 30s to 25. Dr. Annia Bassett alerted to come to bedside. Patient noted to be in complete heart block with intermittent episodes of ventricular tachycardia. Patient talking throughout episodes. Given 2 doses of 0.5 mg of atropine and 150 mg Amiodarone. Patient had frequent episodes of ventricular tachycardia that spontaneously converted back to heart block. BP remained in 120s. 2000  Repeat EKG done and given to Dr. Annia Bassett. 2019  Patient's heart rhythm converted to sinus tachycardia HR 120s-130s at this time. Repeat EKG obtained for rhythm change and given to Dr. Annia Bassett.

## 2020-05-20 NOTE — PROGRESS NOTES
TRANSFER - IN REPORT:    Verbal report received from Maykel Mak RN(name) on Braden Gar  being received from ED(unit) for routine progression of care      Report consisted of patients Situation, Background, Assessment and   Recommendations(SBAR). Information from the following report(s) SBAR, Kardex, ED Summary, MAR, Recent Results and Cardiac Rhythm NSR, LBBB was reviewed with the receiving nurse. Opportunity for questions and clarification was provided. Assessment completed upon patients arrival to unit and care assumed. 2304: Picked up patient from ED. Helped pt situated in room 2550. Bedside report received from ED RN.    2413: Admission screening questions and initial assessment, VS, skin check done. Skin is intact, no pressure injury present upon admission. 0050: Pt complained feeling anxious and said she used to take ativan at home for anxiety. Telemed physician paged for pt's anxiety. 2580: Bedside and Verbal shift change report given to Lorna King RN (oncoming nurse) by Glennette Halsted, RN (offgoing nurse). Report included the following information SBAR, Kardex, ED Summary, Intake/Output, MAR, Recent Results, Med Rec Status and Cardiac Rhythm 1st degree block, LBBB.

## 2020-05-20 NOTE — ROUTINE PROCESS
Bedside and Verbal shift change report given to Mili Mark RN (oncoming nurse) by Salbador Youngblood RN (offgoing nurse). Report included the following information SBAR and Cardiac Rhythm NSR/Paced.

## 2020-05-20 NOTE — ED NOTES
Arrival via EMS for complaints of syncope. Patient alert oriented x4 on arrival.  Per patient having these spells for over 20 years. EMS states recently placed on Metoprolol. Daughter called EMS for mother when she had syncopal episode tonight. Daughter palplated pulse in 29E. EMS placed on cardiac monitor showed slow 2nd degree heart block versus complete heart block with intermittent epidosdes of ventricular tachycardia. Patient given 0.5 mg atropine in 2 doses. After given 200 mL NS bolus and the atropine patient became responsive, skin color pinked up and /70.   HR went from 30s to 40s and patient transported to hospital.

## 2020-05-20 NOTE — CONSULTS
[facility]     Date of  Admission: 5/19/2020  7:39 PM     Admission type:Emergency    Consult for:3rd deg HB, NSVT  Consult by: hospitalist     Subjective:     Hodan Underwood is a 67 y.o. female admitted for Sick sinus syndrome ([facility] [address] [I49.5], Syncope [R55]. Admitted with c/o dizziness/lightheadedness, SOB, palpitations. While sitting in chair yesterday began to have symptoms, called daughter who came and checked pulse which she states was 20. EMS arrived, ECG 3rd HB, and multiple episodes of NSVT. Patient never lost consiousness. She denies CP. Patient states these episodes have been going on for 30 years. Followed by Dr. Ammon Saul, last seen 12/19/2019 post cardiac cath with a 80% D2 and 40% RCA. The patient did have a 30 day monitor placed with no events. During this 12/19/2019 visit, recommended to have a LINQ monitor placed for further evaluation. Patient did not return for appt. PMH:  Dyslipidemia, HTN, syncope    Previous treatment/evaluation includes echocardiogram, stress thallium and cardiac catheterization . Cardiac risk factors: dyslipidemia, obesity, sedentary life style, hypertension.       Patient Active Problem List    Diagnosis Date Noted    NSVT (nonsustained ventricular tachycardia) ([facility] [address] 05/20/2020    AV block, 3rd degree ([facility] [address] 05/20/2020    Sick sinus syndrome ([facility] [address] 05/19/2020    Syncope 05/19/2020    IGT (impaired glucose tolerance) 03/28/2017    Encounter for medication monitoring 09/28/2016    Environmental allergies 09/21/2011    Anxiety 03/24/2011    Hypertriglyceridemia 02/23/2010    GERD (gastroesophageal reflux disease) 02/23/2010    OA (osteoarthritis) 02/23/2010    Vitamin D deficiency 02/23/2010      Jimbo Aiken MD  Past Medical History:   Diagnosis Date    AV block, 3rd degree ([facility] [address] 5/20/2020    GERD (gastroesophageal reflux disease) 2/23/2010    Hypertriglyceridemia 2/23/2010    Hypovitaminosis D     NSVT [facility]nonsustained ventricular tachycardia) [facility] 2020    OA (osteoarthritis) 2010    Osteoarthritis     Vitamin D deficiency 2010      Social History     Socioeconomic History    Marital status:      Spouse name: Not on file    Number of children: Not on file    Years of education: Not on file    Highest education level: Not on file   Tobacco Use    Smoking status: Former Smoker     Packs/day: 0.25     Years: 55.00     Pack years: 13.75     Last attempt to quit: 2019     Years since quittin.5    Smokeless tobacco: Never Used   Substance and Sexual Activity    Alcohol use:  Yes     Alcohol/week: 0.0 standard drinks     Comment: 1 drink a month    Drug use: No    Sexual activity: Not Currently     Allergies   Allergen Reactions    Macrobid [Nitrofurantoin Monohyd/M-Cryst] Rash    Pcn [Penicillins] Hives      Family History   Problem Relation Age of Onset    Hypertension Mother     Heart Failure Mother     Cancer Father         colon    Lung Disease Brother     Arthritis-rheumatoid Brother     Heart Disease Brother       Current Facility-Administered Medications   Medication Dose Route Frequency    LORazepam (ATIVAN) tablet 0.5 mg  0.5 mg Oral Q8H PRN    fentaNYL citrate (PF) injection    PRN    midazolam (VERSED) injection    PRN    0.9% sodium chloride infusion  50 mL/hr IntraVENous CONTINUOUS    acetaminophen (TYLENOL) tablet 650 mg  650 mg Oral Q4H PRN    atropine injection 0.5 mg  0.5 mg IntraVENous Q2.5H PRN    aspirin delayed-release tablet 81 mg  81 mg Oral DAILY    rosuvastatin (CRESTOR) tablet 20 mg  20 mg Oral QHS    alcohol 62% (NOZIN) nasal  1 Ampule  1 Ampule Topical Q12H        Review of Symptoms:   11 systems reviewed, negative other than as stated in the HPI        Objective:      Visit Vitals  /73 (BP 1 Location: Right arm, BP Patient Position: At rest)   Pulse 60   Temp 98 °F (36.7 °C)   Resp 17   Ht 5' 1\" (1.549 m)   Wt 83.2 kg (183 lb 6.8 oz)   SpO2 95%   BMI 34.66 kg/m²       Physical:   General: eldelry  female in no acute distress  Heart: RRR, no m/S3/JVD, no carotid bruits   Lungs: clear   Abdomen: Soft, +BS, NTND   Extremities: LE vu +DP/PT, no edema   Neurologic: Grossly normal    Data Review:   Recent Labs     05/19/20 2004   WBC 9.3   HGB 12.7   HCT 39.2        Recent Labs     05/19/20 2004      K 4.2      CO2 26   *   BUN 30*   CREA 1.32*   CA 9.4   MG 2.2   ALB 3.7   TBILI 0.3   SGOT 14*   ALT 18       No results for input(s): CPK, CKMB in the last 72 hours. No lab exists for component: TROP      Intake/Output Summary (Last 24 hours) at 5/20/2020 0940  Last data filed at 5/20/2020 0700  Gross per 24 hour   Intake 515.84 ml   Output 1500 ml   Net -984.16 ml        Cardiographics    Telemetry: SR  ECG: 3rd degree HB, NSVT noted on EMS monitors    Echocardiogram:   11/2019 Dr. Shady Griggs reread echo 5/20/2020 - EF is normal.  He could not edit the report. And Nuc Lexiscan notes normal EF  · Left Ventricle: Normal cavity size. Moderate systolic dysfunction. Estimated left ventricular ejection fraction is 31 - 35%. Mild (grade 1) left ventricular diastolic dysfunction. · Aortic Valve: Focal aortic valve leaflet calcification present without reduced excursion. · Mitral Valve: Mild mitral valve regurgitation is present. Assessment:       Active Problems:    Sick sinus syndrome (HCC) (5/19/2020)      Syncope (5/19/2020)      NSVT (nonsustained ventricular tachycardia) (HCC) (5/20/2020)      AV block, 3rd degree (HCC) (5/20/2020)         Plan:     3rd degree HB:  Likely culprit of symptoms  Consulted Dr. Elissa Pascal for pacemaker implant. Since the patient had NSVT, Dr. Shady Griggs will perform cardiac cath to evaluate for ischemia. Dr. Shady Griggs and I discussed the risks/benefits/alternatives of cardiac catheterization +/- PCI with the patient.  Risks include (but are not limited to) bleeding, infection, cva/mi/tamponade/death. The patient understands and wishes to proceed. Continue on ASA, BB on hold    Thank you for consulting [address], NP       [facility]    5/20/2020         Patient seen, examined by me personally. Plan discussed as detailed. Agree with note as outlined by  NP. I confirm findings in history and physical exam. No additional findings noted. Agree with plan as outlined above.  Discussed with daughter, Dr. Jayde Mendoza MD

## 2020-05-20 NOTE — PROGRESS NOTES
Hospitalist Progress Note           2020  1:42 PM                       Patient:  Katherleen Baumgarten  PCP:  Daniella Christian MD  Date of admission:  2020    66 yo female with PMhx of OA, GERD, CAD, LBBB, admitted with lightheadedness and near syncope episode at home. Pt had a complete negative work up with Stress, ECHO, 30 day event monitor. EMS found pt with HR in 20s and given Atropine X 2. En route, noticed VTach.     Assessment & Plan  Complete Heart Block , likely cause of Near syncope  NSVT  - s/p Cath due to NSVTs: no interventions done   - s/p PPM with    - hold BB until AM   - Cardio and EP following  - c/w ASA     CAD   - c/w ASA, Crestror  - resume BB in AM after discussing with Cardiology team     Chronic Systolic CHF, compensated   - Last EF 31-35%  - on BB PTA , on hold due to above  - no ACEI/ARBs due to CKD 3    CKD 3  - Cr stable     GERD  - c/w PPI      VTE prophylaxis: SCDs due to procedures   Follow-up labs/studies: NONE   Discussed plan of care with Patient/Family, Nurse and Consultant Alyx Lacks   Disposition:  Anticipate discharge to HOME in AM       Subjective  Pt seen and examined in ICU  Doing better  No lightheadedness dizziness  Spoke with daughter Pat Grove over phone    Review of systems otherwise as above     Physical examination  Visit Vitals  /57   Pulse 62   Temp 97.5 °F (36.4 °C)   Resp 18   Ht 5' 1\" (1.549 m)   Wt 83.2 kg (183 lb 6.8 oz)   SpO2 96%   BMI 34.66 kg/m²      Temp (24hrs), Av °F (36.7 °C), Min:97.5 °F (36.4 °C), Max:98.3 °F (36.8 °C)     O2 Flow Rate (L/min): 2 l/min   O2 Device: Nasal cannula  Visit Vitals  /57   Pulse 62   Temp 97.5 °F (36.4 °C)   Resp 18   Ht 5' 1\" (1.549 m)   Wt 83.2 kg (183 lb 6.8 oz)   SpO2 96%   BMI 34.66 kg/m²    O2 Device: Nasal cannula  Visit Vitals  /57   Pulse 62   Temp 97.5 °F (36.4 °C)   Resp 18   Ht 5' 1\" (1.549 m)   Wt 83.2 kg (183 lb 6.8 oz)   SpO2 96%   BMI 34.66 kg/m²         Intake/Output Summary (Last 24 hours) at 5/20/2020 1342  Last data filed at 5/20/2020 0800  Gross per 24 hour   Intake 615.84 ml   Output 1500 ml   Net -884.16 ml     Last shift:    05/20 0701 - 05/20 1900  In: 100 [I.V.:100]  Out: -   Last 3 shifts:    05/18 1901 - 05/20 0700  In: 515.8 [P.O.:100; I.V.:415.8]  Out: 1500 [Urine:1500]    General:   Alert, cooperative, no acute distress   Head:   No obvious abnormalities, atraumatic   Eyes:   Conjunctivae clear   Oropharynx:  Oral mucosa normal   Neck:  Supple, trachea midline, no adenopathy   No JVD   Lungs:   Clear to auscultation bilaterally    Heart:   Regular rhythm, no murmur   Abdomen:    Soft, non-tender    Bowel sounds normal    No masses or organomegaly    Extremities:  No edema or DVT signs   Pulses:  Symmetric all extremities   Skin:  Warm and dry    No rashes or lesions   Neurologic:  Oriented x3   No focal deficits   Urinary catheter:  deferred     Data review    Recent Labs     05/19/20 2004   WBC 9.3   HGB 12.7   HCT 39.2        Recent Labs     05/19/20 2004      K 4.2      CO2 26   *   BUN 30*   CREA 1.32*   CA 9.4   MG 2.2   ALB 3.7   TBILI 0.3   SGOT 14*   ALT 18     Current Facility-Administered Medications   Medication Dose Route Frequency    LORazepam (ATIVAN) tablet 0.5 mg  0.5 mg Oral Q8H PRN    0.9% sodium chloride infusion  50 mL/hr IntraVENous CONTINUOUS    acetaminophen (TYLENOL) tablet 650 mg  650 mg Oral Q4H PRN    atropine injection 0.5 mg  0.5 mg IntraVENous Q2.5H PRN    aspirin delayed-release tablet 81 mg  81 mg Oral DAILY    rosuvastatin (CRESTOR) tablet 20 mg  20 mg Oral QHS    alcohol 62% (NOZIN) nasal  1 Ampule  1 Ampule Topical Q12H     Total time spent managing care of the patient: 35 minutes.        [address], MD   --Hospitalist, Internal Medicine

## 2020-05-20 NOTE — INTERDISCIPLINARY ROUNDS
Interdisciplinary team rounds were held 5/20/2020 with the following team members:Care Management, Nursing, Nutrition, Palliative Care, Pharmacy, Physical Therapy, Physician and Respiratory Therapy. Plan of care discussed. See clinical pathway and/or care plan for interventions and desired outcomes.

## 2020-05-20 NOTE — PROGRESS NOTES
GONSALO PLAN:  DC home w/ outpt f/u    Reason for Admission:   Pt is a 68 yo female with CAD admitted from home for evaluation/treatment of near syncope. Pt experienced spell of not feeling well, called danay then EMS and was found to be in V tach. Pt is well known to Dr. Misael Carrillo after earlier extensive work-up - found to be in complete heart block; pt underwent cath and subsequent PPM placement. RUR Score:       7 % Low RUR              Plan for utilizing home health:     Pt is not homebound     PCP: First and Last name:  Rangel Keita MD   Name of Practice: [facility]   Are you a current patient: Yes/No:  Yes   Approximate date of last visit:  1/3/20                    Current Advanced Directive/Advance Care Plan:   Full Code. Not on File. Juan Jose Roberts is surrogate medical decision maker. Transition of Care Plan:                    Watch 02 - on 2 lpm today  Hospitalist indicating pt may be stable for dc home on 5/21. Request for PCP appmt sent to CM Specialist  Will need 2nd IM if stays beyond 5/21. Danay to transport home at dc. Care Management Interventions  PCP Verified by CM: Yes  Palliative Care Criteria Met (RRAT>21 & CHF Dx)?: No  Mode of Transport at Discharge:  Other (see comment)  Transition of Care Consult (CM Consult): Discharge Planning  Discharge Durable Medical Equipment: No  Physical Therapy Consult: No  Occupational Therapy Consult: No  Speech Therapy Consult: No  Current Support Network: Lives Alone  Confirm Follow Up Transport: Self  The Plan for Transition of Care is Related to the Following Treatment Goals : Safe [address]  The Patient and/or Patient Representative was Provided with a Choice of Provider and Agrees with the Discharge Plan?: Yes  Name of the Patient Representative Who was Provided with a Choice of Provider and Agrees with the Discharge Plan: Kenyetta Charles  Freedom of Choice List was Provided with Basic Dialogue that Supports the Patient's Individualized Plan of Care/Goals, Treatment Preferences and Shares the Quality Data Associated with the Providers?: Yes  Discharge Location  Discharge Placement: Home with outpatient services     [facility], [place]W  7107

## 2020-05-21 VITALS
HEIGHT: 61 IN | RESPIRATION RATE: 8 BRPM | SYSTOLIC BLOOD PRESSURE: 154 MMHG | WEIGHT: 183.42 LBS | HEART RATE: 82 BPM | DIASTOLIC BLOOD PRESSURE: 68 MMHG | BODY MASS INDEX: 34.63 KG/M2 | OXYGEN SATURATION: 97 % | TEMPERATURE: 97.7 F

## 2020-05-21 LAB
ANION GAP SERPL CALC-SCNC: 5 MMOL/L (ref 5–15)
BUN SERPL-MCNC: 16 MG/DL (ref 6–20)
BUN/CREAT SERPL: 18 (ref 12–20)
CALCIUM SERPL-MCNC: 9.4 MG/DL (ref 8.5–10.1)
CHLORIDE SERPL-SCNC: 111 MMOL/L (ref 97–108)
CO2 SERPL-SCNC: 24 MMOL/L (ref 21–32)
CREAT SERPL-MCNC: 0.89 MG/DL (ref 0.55–1.02)
GLUCOSE SERPL-MCNC: 115 MG/DL (ref 65–100)
MAGNESIUM SERPL-MCNC: 2.5 MG/DL (ref 1.6–2.4)
POTASSIUM SERPL-SCNC: 3.9 MMOL/L (ref 3.5–5.1)
SODIUM SERPL-SCNC: 140 MMOL/L (ref 136–145)

## 2020-05-21 PROCEDURE — 80048 BASIC METABOLIC PNL TOTAL CA: CPT

## 2020-05-21 PROCEDURE — 83735 ASSAY OF MAGNESIUM: CPT

## 2020-05-21 PROCEDURE — 36415 COLL VENOUS BLD VENIPUNCTURE: CPT

## 2020-05-21 PROCEDURE — 74011250637 HC RX REV CODE- 250/637: Performed by: NURSE PRACTITIONER

## 2020-05-21 RX ADMIN — ASPIRIN 81 MG: 81 TABLET ORAL at 08:49

## 2020-05-21 RX ADMIN — CETIRIZINE HYDROCHLORIDE 10 MG: 10 TABLET, FILM COATED ORAL at 08:49

## 2020-05-21 RX ADMIN — PANTOPRAZOLE SODIUM 40 MG: 40 TABLET, DELAYED RELEASE ORAL at 08:49

## 2020-05-21 RX ADMIN — RANOLAZINE 500 MG: 500 TABLET, FILM COATED, EXTENDED RELEASE ORAL at 08:49

## 2020-05-21 RX ADMIN — MELATONIN 1 TABLET: at 08:49

## 2020-05-21 NOTE — PROGRESS NOTES
07:00 - Bedside report rec'd from Leslye Yeboah RN.    07:50 - Patient reports feeling dizzy and somewhat anxious after going to BR. VSS. Will monitor. 08:45 - Reports dizziness has resolved. 10:34 - Orthostatics completed. No c/o dizziness, VSS. Nishi Henry NP made aware. Patient ok for DC.

## 2020-05-21 NOTE — DISCHARGE SUMMARY
Hospitalist Discharge Summary     Patient ID:  Solomon Perkins  [id number]  89 y.o.  1940  5/19/2020    PCP on record: Melissa Rowe MD    Admit date: 5/19/2020  Discharge date and time: 5/21/2020    DISCHARGE DIAGNOSIS:  SSS  CHB  NSVT  CAD  Chronic Systolic heart failure   CKD stage III  GERD     CONSULTATIONS:  None    Excerpted HPI from H&P of Nata Norris MD:  Vinay León is a 67 y.o.  female with a past history of coronary artery disease, left bundle branch block and syncope who presents after another near syncopal episode. Patient has had a history of intermittent lightheadedness and syncope and has had an extensive cardiac work-up that included stress testing, echo and a 30-day event monitor that was unremarkable. Today she was sitting on her couch when she started to experience another 'spell' coming on. She went to get some ice, wrapped it with a towel and placed it on her neck. She called her daughter who came over and subsequently called for EMS. EMS found her heart rate to be in the 20s and the patient received atropine x2 and IV fluid bolus. En route to the ER runs of FREDA tach was noted. In the ER she was in first-degree AV block but subsequently her heart rate dropped down to the 30s and she was given more atropine. At some point she again went into ventricular tachycardia and was given IV amiodarone 150 mg bolus and external pacers were applied. Cardiology was consulted by phone. Admission was recommended and the hospitalist were asked to admit for work up and evaluation of the above problems. \"    ______________________________________________________________________  DISCHARGE SUMMARY/HOSPITAL COURSE:  for full details see H&P, daily progress notes, labs, consult notes.      Jimi Devlin is a 78-year-old female with PMH significant for GERD, dyslipidemia, OA, vitamin D deficiency, CKD III, CAD, and chronic systolic heart failure who presented to the ED via EMS with complaints of near syncope. When EMS presented to the patient's home they found her with a heart rate of 20 bpm.  She received 2 doses of IV atropine and a fluid bolus and was transported to the ED. In route to the ED she was noted to have runs of NSVT. On presentation to the ED she was again bradycardic and received additional atropine. At some point in the ED she again had an episode of VT and was placed on IV amiodarone. Cardiology consult was requested and the patient was admitted for further evaluation and treatment. The patient was seen by cardiology and underwent cardiac catheterization which revealed moderate CAD with normal LV function (LVEF 55-60%). The patient subsequently was taken to the EP lab where she underwent a successful permanent pacemaker implantation. The patient returned to her room in stable condition. The following morning after evaluation by cardiology the patient was cleared for discharge with outpatient follow-up. It was also felt that she was medically stable for discharge. All discharge instructions and discharge medications were reviewed with the patient who verbalized understanding. At the time of this dictation the patient's vital signs were as follows:  T 97.7, /72, HR 82, RR 16 (counted by me), SpO2 97% on room air.        _______________________________________________________________________  Patient seen and examined by me on discharge day. Pertinent Findings:  General:  A/A/O X 3. NAD. HEENT:  Normocephalic. Sclera anicteric. Mucous membranes moist.    Chest:  Resps even/unlabored with symmetrical CWE. Air entry full. Lungs CTA. No use of accessory muscles. CV:  RRR. No peripheral edema. GI:  Abdomen soft/NT/ND. ABT X 4.    :  Voiding. Neurologic:  Nonfocal.  CN II-XII grossly intact. Speech normal.     Psych:  Cooperative. No anxiety or agitation. Skin:  No rashes or jaundice.   [facility] PPM implantation site with C/D/I dressing. No swelling or ecchymosis. _______________________________________________________________________  DISCHARGE MEDICATIONS:   Current Discharge Medication List      CONTINUE these medications which have NOT CHANGED    Details   metoprolol succinate (TOPROL-XL) 25 mg XL tablet Take 0.5 Tabs by mouth nightly. Qty: 45 Tab, Refills: 3      rosuvastatin (CRESTOR) 20 mg tablet Take 1 Tab by mouth nightly. Qty: 90 Tab, Refills: 3      ranolazine ER (RANEXA) 500 mg SR tablet Take 1 Tab by mouth two (2) times a day. Qty: 180 Tab, Refills: 3      aspirin delayed-release 81 mg tablet Take 1 Tab by mouth daily. Qty: 30 Tab, Refills: 5      LORazepam (ATIVAN) 1 mg tablet Take 0.5-1 Tabs by mouth every eight (8) hours as needed for Anxiety. Qty: 90 Tab, Refills: 1    Associated Diagnoses: Anxiety      pantoprazole (PROTONIX) 40 mg tablet Take 1 Tab by mouth daily. Qty: 90 Tab, Refills: 3    Associated Diagnoses: Gastroesophageal reflux disease without esophagitis      cetirizine (ZYRTEC) 10 mg tablet Take 10 mg by mouth daily. Associated Diagnoses: Environmental allergies      Cholecalciferol, Vitamin D3, (VITAMIN D3) 1,000 unit Cap Take 1 Cap by mouth daily. Associated Diagnoses: Encounter for medication monitoring         STOP taking these medications       ibuprofen (MOTRIN) 200 mg tablet Comments:   Reason for Stopping:                 Patient Follow Up Instructions:    Activity:  Per cardiology  Diet: Cardiac Diet  Wound Care:  Per cardiology    Follow-up with PCP and cardiology as noted below  Follow-up tests/labs per PCP  Follow-up Information     Follow up With Specialties Details Why Contact Info    Kayleigh Ivory MD Family Practice Go on 5/26/2020 For [facility] follow up appointment at 10:30AM  [address] [place] [address]  [phone]      Orlando Tsang MD Cardiology, [address] [place] [address] Vascular Surgery, Internal Medicine In 2 weeks Office will call with appointment [address]       Shayla Parsons MD Cardiology, [address] [facility] Vascular Surgery, Internal Medicine In 1 month Office will call with f/u appointment [address]  [address] [address] [phone]          ________________________________________________________________    Risk of deterioration: Low    Condition at Discharge:  Stable  __________________________________________________________________    Disposition  Home with family, no needs    ____________________________________________________________________    Code Status: Full Code  ___________________________________________________________________      Total time in minutes spent coordinating this discharge (includes going over instructions, follow-up, prescriptions, and preparing report for sign off to her PCP) :  35 minutes    Signed:  Cassie Cardozo NP

## 2020-05-21 NOTE — PROGRESS NOTES
Failed attempt to meet with patient and pesent 2nd IM letter. Healthcare team at [facility]. 1055am  2nd IM copy provided to patient. Copy on chart. No signature required due to COVID 19 polciies in place.     Nina Petty RN CM  Ext [phone]

## 2020-05-21 NOTE — DISCHARGE INSTRUCTIONS
[address] [place] [address]  [phone]        NEW PACEMAKER IMPLANT DISCHARGE INSTRUCTIONS    Patient ID:  Milagro Maria  [id number]  26 y.o.  1940    Admit Date: 5/19/2020    Discharge Date: 5/20/2020     Admitting Physician: Velma John MD     Discharge Physician: Velma John MD    Admission Diagnoses:   Sick sinus syndrome [facility]) [I49.5]  Syncope [R55]    Discharge Diagnoses: Active Problems:    Sick sinus syndrome ([facility] [address] (5/19/2020)      Syncope (5/19/2020)      NSVT (nonsustained ventricular tachycardia) ([facility]) (5/20/2020)      AV block, 3rd degree ([address] (5/20/2020)      S/P placement of cardiac pacemaker (5/20/2020)      Overview: 5/20/2020 Medtronic dual chamber pacemaker implant            S/P cardiac cath (5/20/2020)        Discharge Condition: Good    Cardiology Procedures this Admission:  Pacemaker insertion. Disposition: home    Reference discharge instructions provided by nursing for diet and activity. Follow-up with device clinic in three weeks. Call [phone] to make an appointment. Signed:  Maia Raines NP  5/20/2020  10:38 AM      DISCHARGE INSTRUCTIONS FOR PATIENTS WITH PACEMAKERS    1. Remember to call for an appointment for 3 weeks [phone] to check healing and implant programming. 2. Medic Alert Bracelets are available from your pharmacist to wear at all times if you choose to wear one. 3. Carry your ID card for pacemaker with you at all times. This card will be given to you in the hospital or mailed to you. 4. The pacemaker will bulge slightly under your skin. The bulge will decrease in size over the next few weeks. Please notify the doctor's office if you notice any of the following around your site:   A.  A bruise that does not go away. B.  Soreness or yellow, green, or brown drainage from the site. C. Any swelling from the site. D. If you have a fever of 100 degrees or higher that lasts for a few days.     INCISION CARE       1.  Leave the dressing over your site until your follow up visit. 2.  You may not shower until after follow up visit. 3.  For comfort, wear loose fitting clothing. 1. 4.  Ice pack to affected shoulder for first 24 hours, wear your sling for 2 days. 2. 5.  Report any signs of infection, fever, pain, swelling, redness, oozing, or heat at site especially if these symptoms increase after the first 3 to 4 days. ACTIVITY PRECAUTIONS     1. Avoid rough contact with the implant site. 2. No driving for 14 days. 3. Avoid lifting your arm over your head, carrying anything on the affected side, or lifting over 10 pounds for 90 days. For the first 2 days only bend your arm at the elbow. 4. Any extreme activity such as golf, weight lifting or exercise biking should be restricted for 60 days. 5. Do not carry objects by holding them against your implant site. 6.  No shooting rifles or any type of gun with the affected shoulder permanently. SPECIAL PRECAUTIONS     1. You should avoid all strong magnetic fields, such as arc welding, large transformers, large motors. 2.  You may or may not (depending on your device) have an MRI which uses a strong magnet to take pictures. 3.  Treatments or surgery that requires diathermy or electrocautery should be discussed with your doctor before scheduled. 4. Avoid radio frequency transmitters, including radar. 5. Advise dentist or other medical personnel you see that you have a pacemaker. 6.  Cell phones and microwave oven use is okay. 7.  If you plan to move or take a trip to a new area, the doctor's office will give you a name of a doctor to contact for any problems. ANTIBIOTIC THERAPY    During the first 8 weeks after your pacemaker insertion, you may need antibiotics before any dental work or certain tests or operations. Let the dentist or doctor who is caring for you know that you have had an implanted device.                    Radial Cardiac Catheterization/Angiography Discharge Instructions    It is normal to feel tired the first couple days. Take it easy and follow the physicians instructions. CHECK THE CATHETER INSERTION SITE DAILY:    Remove the wrist dressing 24 hours after the procedure. You may shower 24 hours after the procedure. Wash with soap and water and pat dry. Gentle cleaning of the site with soap and water is sufficient, cover with a dry clean dressing or bandage. Do not apply creams or powders to the area. No soaking the wrist for 3 days. Leave the puncture site open to air after 24 hours post-procedure. CALL THE PHYSICIANS:     If the site becomes red, swollen or feels warm to the touch  If there is bleeding or drainage or if there is unusual pain at the radial site. If there is any minor oozing, you may apply a band-aid and remove after 12 hours. If the bleeding continues, hold pressure with the middle finger against the puncture site and the thumb against the back of the wrist,call 911 to be transported to the hospital.  DO NOT DRIVE YOURSELF, [place] [id number]. ACTIVITY:   For the first 24 hours do not manipulate the wrist.  No lifting, pushing or pulling over 3-5 pounds with the affected wrist for 7 daysand no straining the insertion site. Do not life grocery bags or the garbage can, do not run the vacuum  or  for 7 days. Start with short walks as in the hospital and gradually increase as tolerated each day. It is recommended to walk 30 minutes 5-7 days per week. Follow your physicians instructions on activity. Avoid walking outside in extremes of heat or cold. Walk inside when it is cold and windy or hot and humid. Things to keep in mind:  No driving for at least 24 hours, or as designated by your physician. Limit the number of times you go up and down the stairs  Take rests and pace yourself with activity.   Be careful and do not strain with bowel movements. MEDICATIONS:    Take all medications as prescribed  Call your physician if you have any questions  Keep an updated list of your medications with you at all times and give a list to your physician and pharmacist    SIGNS AND SYMPTOMS:   Be cautious of symptoms of angina or recurrent symptoms such as chest discomfort, unusual shortness of breath or fatigue. These could be symptoms of restenosis, a new blockage or a heart attack. If your symptoms are relieved with rest it is still recommended that you notify your physician of recurrent chest pain or discomfort. For CHEST PAIN or symptoms of angina not relieved with rest:  If the discomfort is not relieved with rest, and you have been prescribed Nitroglycerin, take as directed (taken under the tongue, one at a time 5 minutes apart for a total of 3 doses). If the discomfort is not relieved after the 3rd nitroglycerin, call 911. If you have not been prescribed Nitroglycerin  and your chest discomfort is not relieved with rest, call 911. AFTER CARE:   Follow up with your physician as instructed. Follow a heart healthy diet with proper portion control, daily stress management, daily exercise, blood pressure and cholesterol control , and smoking cessation. HOSPITALIST DISCHARGE INSTRUCTIONS    NAME: Solomon Perkins   :  1940   MRN:  [id number]     Date/Time:  2020 10:05 AM    ADMIT DATE: 2020   DISCHARGE DATE: 2020     Attending Physician: Tonny Skinner NP    DISCHARGE DIAGNOSIS:  Sick sinus syndrome  Complete Heart Block  Nonsustained ventricular tachycardia  Coronary artery disease  Chronic Systolic heart failure   Chronic kidney disease stage 3  Gastroesophageal reflux disease       Medications: Per above medication reconciliation.     Pain Management: per above medications    Recommended diet: Cardiac Diet    Recommended activity: Limit activity as discussed with Cardiologist    Wound care: As discussed with Cardiologist.  Report any bleeding, swelling, or excessive bruising at pacemaker insertion site    Indwelling devices:  Permanent pacemaker    Supplemental Oxygen: None    Required Lab work: Per Primary Care Provider    Glucose management:  None    Code status: Full        Outside physician follow up: Follow-up Information     Follow up With Specialties Details Why Contact Info    Jimbo Dawson MD Family Practice Go on 5/26/2020 For [facility] hospital follow up appointment at 10:30AM  [address]  [phone]      Ar Jarquin MD Cardiology, [address] [facility] In 2 weeks Office will call with appointment [address]  [address]       Wallace Castanon MD Cardiology, [address] [facility] In 1 month Office will call with f/u appointment [address]  [address] [address] [phone]            Information obtained by :  I understand that if any problems occur once I am at home I am to contact my physician. I understand and acknowledge receipt of the instructions indicated above.                                                                                                                                            Physician's or R.N.'s Signature                                                                  Date/Time                                                                                                                                              Patient or Representative

## 2020-05-21 NOTE — PROGRESS NOTES
Discharge instructions are reviewed with patient, Patient verbalized understanding, Patient is able to sign. She awaiting her daughter to take her home.

## 2020-05-21 NOTE — PROGRESS NOTES
[address] [facility], [address] [facility]  [phone]      Cardiology Progress Note      5/21/2020 9:40 AM    Admit Date: 5/19/2020    Admit Diagnosis:   Sick sinus syndrome ([address] [I49.5]  Syncope [R55]    Subjective:     Aaron Lozoya has no c/o CP, SOB. S/p cardiac cath with normal coronaries. S/p Medtronic dual chamber pacemaker implant for 3rd deg HB. Had some dizziness this AM after walking to bathroom, resolved. Checked orthostatics, normal    Visit Vitals  /74   Pulse 82   Temp 97.7 °F (36.5 °C)   Resp 8   Ht 5' 1\" (1.549 m)   Wt 83.2 kg (183 lb 6.8 oz)   SpO2 97%   BMI 34.66 kg/m²       Current Facility-Administered Medications   Medication Dose Route Frequency    LORazepam (ATIVAN) tablet 0.5 mg  0.5 mg Oral Q8H PRN    pantoprazole (PROTONIX) tablet 40 mg  40 mg Oral DAILY    cetirizine (ZYRTEC) tablet 10 mg  10 mg Oral DAILY    cholecalciferol (VITAMIN D3) (1000 Units /25 mcg) tablet 1 Tab  1,000 Units Oral DAILY    metoprolol succinate (TOPROL-XL) XL tablet 12.5 mg  12.5 mg Oral QHS    ranolazine ER (RANEXA) tablet 500 mg  500 mg Oral BID    0.9% sodium chloride infusion  50 mL/hr IntraVENous CONTINUOUS    acetaminophen (TYLENOL) tablet 650 mg  650 mg Oral Q4H PRN    atropine injection 0.5 mg  0.5 mg IntraVENous Q2.5H PRN    aspirin delayed-release tablet 81 mg  81 mg Oral DAILY    rosuvastatin (CRESTOR) tablet 20 mg  20 mg Oral QHS       Objective:      Physical Exam:  General Appearance:  elderly  female in no acute distress  Chest:   Clear  Cardiovascular:  Regular rate and rhythm, no murmur. Abdomen:   Soft, non-tender, bowel sounds are active. Extremities: left subclavian site D/I, no hematoma  Skin:  Warm and dry.      Data Review:   Recent Labs     05/19/20 2004   WBC 9.3   HGB 12.7   HCT 39.2        Recent Labs     05/21/20  0511 05/19/20 2004    139   K 3.9 4.2   * 107   CO2 24 26   * 138*   BUN 16 30*   CREA 0.89 1.32*   CA 9.4 9.4   MG 2.5* 2.2   ALB  --  3.7   TBILI  --  0.3   SGOT  --  14*   ALT  --  18       Recent Labs     05/19/20 2004   Barrett Guardian <0.05         Intake/Output Summary (Last 24 hours) at 5/21/2020 0940  Last data filed at 5/21/2020 3234  Gross per 24 hour   Intake 1730.84 ml   Output 1500 ml   Net 230.84 ml        Telemetry: SR  Cxray: 5/20/2020 no pneumo    Assessment:     Active Problems:    Sick sinus syndrome ([facility]) (5/19/2020)      Syncope (5/19/2020)      NSVT (nonsustained ventricular tachycardia) ([facility]) (5/20/2020)      AV block, 3rd degree ([address] (5/20/2020)      S/P placement of cardiac pacemaker (5/20/2020)      Overview: 5/20/2020 Medtronic dual chamber pacemaker implant            S/P cardiac cath (5/20/2020)        Plan:     3rd degree HB:  Likely culprit of symptoms  Normal cardiac cath  Medtronic dual chamber pacemaker implanted 5/20/2020  Restarted BB, Ranexa, ASA    OK for discharge to home  F/u Dr. Drea Levy 2 weeks  F/u Dr. Colleen Sorenson 1 month      [address] Cardiology    5/21/2020         Patient seen, examined by me personally. Plan discussed as detailed. Agree with note as outlined by  NP. I confirm findings in history and physical exam. No additional findings noted. Agree with plan as outlined above.      Josh Lugo MD

## 2020-05-21 NOTE — PROGRESS NOTES
RAPID RESPONSE TEAM- Follow Up     Rounded on patient due to recent transfer from CCU. Spoke with primary RN, Pamela Jimenez. No acute concerns at this time. Patient got PPM today; no tachycardic episodes. VSS. MEWS 1. No RRT interventions currently indicated. Please call back if needed.      Amy Hoff RN  Ext. [phone]    Vitals w/ MEWS Score (last day)     Date/Time MEWS Score Pulse Resp Temp BP Level of Consciousness SpO2    05/20/20 1944  1  75  16  97.9 °F (36.6 °C)  128/53  Alert  99 %    05/20/20 1552  1  68  16  97.8 °F (36.6 °C)  145/49  Alert  98 %    05/20/20 1512    66  18    139/48    100 %    05/20/20 1500    75  16    139/48    98 %    05/20/20 1400    62  19    104/83    95 %    05/20/20 1300  2  62  18  97.5 °F (36.4 °C)  131/57  Responds to Voice  96 %    05/20/20 1230    68  24    105/89    99 %    05/20/20 1200  2  60  18  98.2 °F (36.8 °C)  130/55  Responds to Voice  94 %    05/20/20 1130    60  17    127/45    95 %    05/20/20 09:00:29            Alert      05/20/20 0800  1  60  17  98 °F (36.7 °C)  139/73  Alert  95 %    05/20/20 0700    60  16    140/79    96 %    05/20/20 0600    60  17    134/50    96 %    05/20/20 0500    63  19    125/59    97 %    05/20/20 0436  2  63  23  98.3 °F (36.8 °C)  125/58  Alert  98 %    05/20/20 0300    64      133/63  Alert      05/20/20 0200    70      128/51  Alert      05/20/20 0147          (!) 113/91  Alert      05/19/20 2304  2  83  22  97.9 °F (36.6 °C)  138/65  Alert  98 %    05/19/20 2240    83  18    107/75    97 %    05/19/20 2230    88  21    121/77    99 %    05/19/20 2210    98  17    139/58    98 %    05/19/20 2207    98  20    134/71    97 %    05/19/20 2200    96  18    113/66    99 %    05/19/20 2150    99  23    114/71    97 %    05/19/20 2140    (!) 102  21    127/84    97 %    05/19/20 2130    98  23    130/63        05/19/20 2120    (!) 103  20    138/75    96 % 05/19/20 2110    (!) 108  29    132/70    96 %    05/19/20 2100    (!) 105  22    129/56    98 %    05/19/20 2050    (!) 109  19        100 %    05/19/20 2042    (!) 110  24    129/52    100 %    05/19/20 2040    (!) 113  21        100 %    05/19/20 2030    (!) 117  23    109/89    97 %    05/19/20 2022    (!) 133  (!) 31    170/89    100 %    05/19/20 2008      12    141/54    98 %    05/19/20 2000    (!) 49  (!) 34    (!) 127/103        05/19/20 1956    (!) 38  16    122/56    100 %    05/19/20 19:53:54  4  (!) 38  21  98.1 °F (36.7 °C)  123/40  Alert  100 %    05/19/20 1950    (!) 25      123/40        05/19/20 1949  1  (!) 42  13  98.1 °F (36.7 °C)  123/40  Alert  98 %    05/19/20 1945              97 %

## 2020-05-21 NOTE — PROGRESS NOTES
1930 - Bedside report from [facility]. NSR, 1st degree AVB, rate 60-70's. BP stable. No complaints. L upper chest and R wrist benign. 2145 - Tylenol for prophlactic HS comfort per request.      0700  - Bedside report to RN.

## 2020-05-21 NOTE — ED PROVIDER NOTES
EMERGENCY DEPARTMENT HISTORY AND PHYSICAL EXAM      Date: 5/19/2020  Patient Name: Ba Gorman    History of Presenting Illness     Chief Complaint   Patient presents with    Syncope       History Provided By: Patient and EMS    HPI: Ba Gorman, 67 y.o. female presents to the ED with cc of near syncope. Patient started to feel lightheaded today. Her daughter called EMS and EMS found her heart rate to be in the 20s. Patient received 2 doses of atropine and IV fluids. On route to the ER, the patient had runs of V. tach. She denies chest pain, shortness of breath, cough, fever or chills. She denies vomiting, diarrhea or dysuria. There are no other complaints, changes, or physical findings at this time. PCP: Guerrero Iverson MD    No current facility-administered medications on file prior to encounter. Current Outpatient Medications on File Prior to Encounter   Medication Sig Dispense Refill    metoprolol succinate (TOPROL-XL) 25 mg XL tablet Take 0.5 Tabs by mouth nightly. 45 Tab 3    rosuvastatin (CRESTOR) 20 mg tablet Take 1 Tab by mouth nightly. 90 Tab 3    ranolazine ER (RANEXA) 500 mg SR tablet Take 1 Tab by mouth two (2) times a day. 180 Tab 3    aspirin delayed-release 81 mg tablet Take 1 Tab by mouth daily. 30 Tab 5    LORazepam (ATIVAN) 1 mg tablet Take 0.5-1 Tabs by mouth every eight (8) hours as needed for Anxiety. 90 Tab 1    pantoprazole (PROTONIX) 40 mg tablet Take 1 Tab by mouth daily. 90 Tab 3    ibuprofen (MOTRIN) 200 mg tablet Take 500 mg by mouth every six (6) hours as needed for Pain.  cetirizine (ZYRTEC) 10 mg tablet Take 10 mg by mouth daily.  Cholecalciferol, Vitamin D3, (VITAMIN D3) 1,000 unit Cap Take 1 Cap by mouth daily.            Past History     Past Medical History:  Past Medical History:   Diagnosis Date    AV block, 3rd degree ([facility] [address] 5/20/2020    GERD (gastroesophageal reflux disease) 2/23/2010    Hypertriglyceridemia 2010    Hypovitaminosis D     NSVT (nonsustained ventricular tachycardia) (HCC) 2020    OA (osteoarthritis) 2010    Osteoarthritis     S/P cardiac cath 2020    S/P placement of cardiac pacemaker 2020 Medtronic dual chamber pacemaker implant     Vitamin D deficiency 2010       Past Surgical History:  Past Surgical History:   Procedure Laterality Date    HX ACL RECONSTRUCTION      HX CATARACT REMOVAL  2018    right eye    HX HEENT      cervical neck stenosis s/p cervical release    HX HERNIA REPAIR  3198    umbilical    HX HIP REPLACEMENT  2007    right    HX HIP REPLACEMENT  2007    left    HX ORTHOPAEDIC  2008    cervical release    CT COLONOSCOPY FLX DX W/COLLJ SPEC WHEN PFRMD  2006    CT COLONOSCOPY FLX DX W/COLLJ SPEC WHEN PFRMD  2011    Dr. Tucker Martinez INS NEW/RPLCMT PRM PM W/TRANSV ELTRD ATRIAL&VENT N/A 2020    Insert Ppm Dual performed by Orlando Tsang [facility] at [facility] CARDIAC C[facility] LAB       Family History:  Family History   Problem Relation Age of Onset    Hypertension Mother     Heart Failure Mother     Cancer Father         colon    Lung Disease Brother     Arthritis-rheumatoid Brother     Heart Disease Brother        Social History:  Social History     Tobacco Use    Smoking status: Former Smoker     Packs/day: 0.25     Years: 55.00     Pack years: 13.75     Last attempt to quit: 2019     Years since quittin.5    Smokeless tobacco: Never Used   Substance Use Topics    Alcohol use: Yes     Alcohol/week: 0.0 standard drinks     Comment: 1 drink a month    Drug use: No       Allergies: Allergies   Allergen Reactions    Macrobid [Nitrofurantoin Monohyd/M-Cryst] Rash    Pcn [Penicillins] Hives         Review of Systems   Review of Systems   Constitutional: Negative for chills and fever. HENT: Negative for congestion. Eyes: Negative. Respiratory: Negative for shortness of breath. Cardiovascular: Negative for chest pain. Gastrointestinal: Negative for abdominal pain. Endocrine: Negative for heat intolerance. Genitourinary: Negative. Musculoskeletal: Negative for back pain. Skin: Negative for rash. Allergic/Immunologic: Negative for immunocompromised state. Neurological: Positive for light-headedness. Hematological: Does not bruise/bleed easily. Psychiatric/Behavioral: Negative. All other systems reviewed and are negative. Physical Exam   Physical Exam  Vitals signs and nursing note reviewed. Constitutional:       General: She is not in acute distress. Appearance: She is well-developed. HENT:      Head: Normocephalic. Neck:      Musculoskeletal: Normal range of motion and neck supple. Cardiovascular:      Rate and Rhythm: Regular rhythm. Bradycardia present. Heart sounds: Normal heart sounds. Pulmonary:      Effort: Pulmonary effort is normal.      Breath sounds: Normal breath sounds. Abdominal:      General: Bowel sounds are normal.      Palpations: Abdomen is soft. Tenderness: There is no abdominal tenderness. Musculoskeletal: Normal range of motion. Skin:     General: Skin is warm and dry. Neurological:      General: No focal deficit present. Mental Status: She is alert and oriented to person, place, and time.    Psychiatric:         Mood and Affect: Mood normal.         Behavior: Behavior normal.         Diagnostic Study Results     Labs -     Recent Results (from the past 12 hour(s))   METABOLIC PANEL, BASIC    Collection Time: 05/21/20  5:11 AM   Result Value Ref Range    Sodium 140 136 - 145 mmol/L    Potassium 3.9 3.5 - 5.1 mmol/L    Chloride 111 (H) 97 - 108 mmol/L    CO2 24 21 - 32 mmol/L    Anion gap 5 5 - 15 mmol/L    Glucose 115 (H) 65 - 100 mg/dL    BUN 16 6 - 20 MG/DL    Creatinine 0.89 0.55 - 1.02 MG/DL    BUN/Creatinine ratio 18 12 - 20      GFR est AA >60 >60 ml/min/1.73m2    GFR est non-AA >60 >60 ml/min/1.73m2 Calcium 9.4 8.5 - 10.1 MG/DL   MAGNESIUM    Collection Time: 05/21/20  5:11 AM   Result Value Ref Range    Magnesium 2.5 (H) 1.6 - 2.4 mg/dL       Radiologic Studies -   XR CHEST SNGL V   Final Result   IMPRESSION: No pneumothorax. No acute disease. CT Results  (Last 48 hours)    None        CXR Results  (Last 48 hours)               05/20/20 1122  XR CHEST SNGL V Final result    Impression:  IMPRESSION: No pneumothorax. No acute disease. Narrative:  EXAM: Portable CXR. 1111 hours. INDICATION: Pacemaker placement. FINDINGS:   Pacemaker has been placed via the left subclavian vein. The lungs appear clear. Heart is normal in size. There is no pulmonary edema. There is no evident   pneumothorax, adenopathy or pleural effusion. Medical Decision Making   I am the first provider for this patient. I reviewed the vital signs, available nursing notes, past medical history, past surgical history, family history and social history. Vital Signs-Reviewed the patient's vital signs. Patient Vitals for the past 12 hrs:   Temp Pulse Resp BP SpO2   05/21/20 1034  82  154/68    05/21/20 1032  75  165/67    05/21/20 1030  76  159/74    05/21/20 0750  82  152/74 97 %   05/21/20 0707 97.7 °F (36.5 °C) 79 8 112/69 97 %   05/21/20 0213 98.1 °F (36.7 °C) 65 16 143/57 96 %       EKG interpretation: (Preliminary)#1  Rhythm: sinus bradycardia and heart block; and regular . Rate (approx.): 52; Axis: left axis deviation; AK interval: normal; QRS interval: normal ; ST/T wave: non-specific changes; Other findings: abnormal ekg. ED EKG interpretation:#2  Rhythm: sinus bradycardia and 3rd degree block; and irregular. Rate (approx.): 30; Axis: left axis deviation; ; QRS interval: normal ; ST/T wave: non-specific changes; in  Lead: ; Other findings: abnormal ekg. This EKG was interpreted by Slade Martin MD,ED Provider.     ED EKG interpretation:  Rhythm: wide QRS tachycardia; and irregular. Rate (approx.): 132; Axis: left axis deviation; ; QRS interval: prolonged; ST/T wave: non-specific changes; in  Lead:   ; Other findings: abnormal ekg. This EKG was interpreted by Pedro Hoover MD,ED Provider. Records Reviewed: Nursing Notes, Old Medical Records, Previous electrocardiograms, Ambulance Run Sheet, Previous Radiology Studies and Previous Laboratory Studies    Provider Notes (Medical Decision Making):   Bradycardia, complete heart block, V-tach, electrolyte abnormality, dehydration    ED Course:   Initial assessment performed. The patients presenting problems have been discussed, and they are in agreement with the care plan formulated and outlined with them. I have encouraged them to ask questions as they arise throughout their visit. Progress Note:    Initially, the patient's EKG suggested sinus bradycardia with first-degree block. Patient heart rate decreased into the 30s and was given another dose of atropine. Subsequently, she went into V. tach with a rate in the 130s to 150s. Patient received 1 bolus of amiodarone. Consult note:    I spoke to Dr. Moira Hood, Cardiology. He recommends holding off on atropine and amiodarone at this time. Patient has another recurrence of V. tach, he recommends amiodarone drip.         Consult Note:    Patient is being admitted by the hospitalist             Critical Care Time:   CRITICAL CARE NOTE :    12:12 PM    IMPENDING DETERIORATION -Cardiovascular and Metabolic  ASSOCIATED RISK FACTORS - Hypotension, Dysrhythmia, Metabolic changes and Dehydration  MANAGEMENT- Bedside Assessment and Supervision of Care  INTERPRETATION -  Xrays, ECG and Blood Pressure  INTERVENTIONS - hemodynamic mngmt and Metobolic interventions  CASE REVIEW - Hospitalist, Medical Sub-Specialist and Nursing  TREATMENT RESPONSE -Improved  PERFORMED BY - Self    NOTES   :  I have spent 65 minutes of critical care time involved in lab review, consultations with specialist, family decision- making, bedside attention and documentation. This time excludes time spent in any separate billed procedures. During this entire length of time I was immediately available to the patient . Danette Foote MD      Disposition:  admit      DISCHARGE PLAN:  1. Current Discharge Medication List      CONTINUE these medications which have NOT CHANGED    Details   metoprolol succinate (TOPROL-XL) 25 mg XL tablet Take 0.5 Tabs by mouth nightly. Qty: 45 Tab, Refills: 3      rosuvastatin (CRESTOR) 20 mg tablet Take 1 Tab by mouth nightly. Qty: 90 Tab, Refills: 3      ranolazine ER (RANEXA) 500 mg SR tablet Take 1 Tab by mouth two (2) times a day. Qty: 180 Tab, Refills: 3      aspirin delayed-release 81 mg tablet Take 1 Tab by mouth daily. Qty: 30 Tab, Refills: 5      LORazepam (ATIVAN) 1 mg tablet Take 0.5-1 Tabs by mouth every eight (8) hours as needed for Anxiety. Qty: 90 Tab, Refills: 1    Associated Diagnoses: Anxiety      pantoprazole (PROTONIX) 40 mg tablet Take 1 Tab by mouth daily. Qty: 90 Tab, Refills: 3    Associated Diagnoses: Gastroesophageal reflux disease without esophagitis      cetirizine (ZYRTEC) 10 mg tablet Take 10 mg by mouth daily. Associated Diagnoses: Environmental allergies      Cholecalciferol, Vitamin D3, (VITAMIN D3) 1,000 unit Cap Take 1 Cap by mouth daily.       Associated Diagnoses: Encounter for medication monitoring         STOP taking these medications       ibuprofen (MOTRIN) 200 mg tablet Comments:   Reason for Stoppin.   Follow-up Information     Follow up With Specialties Details Why Contact Info    Vicky Shine MD Family Practice Go on 2020 PCP - For [facility] follow up appointment at 10:30AM  [address]  [phone]      Danny Devine MD Cardiology, [address] [facility] Vascular Surgery, Internal Medicine In 2 weeks Cardiology - Office will call with appointment [address] [facility]  [phone]      Osmin Taylor MD Cardiology, [address] Vascular Surgery, Internal Medicine In 1 month Cardiology - Office will call with f/u appointment [address]  [address] [address] [phone]          3. Return to ED if worse     Diagnosis     Clinical Impression:   1. V tach ([address]    2. Bradycardia        Attestations:    Pedro Hoover MD    Please note that this dictation was completed with [employer], the computer voice recognition software. Quite often unanticipated grammatical, syntax, homophones, and other interpretive errors are inadvertently transcribed by the computer software. Please disregard these errors. Please excuse any errors that have escaped final proofreading. Thank you.

## 2020-05-21 NOTE — PROGRESS NOTES
Cardiology Progress Note              [address], [address] [facility]  [phone]    5/21/2020 10:26 AM    Admit Date: 5/19/2020    Admit Diagnosis: Sick sinus syndrome ([address] [I49.5]; Syncope [R55]    Subjective:     Hodan Underwood   denies chest pain.     Visit Vitals  /74   Pulse 82   Temp 97.7 °F (36.5 °C)   Resp 8   Ht 5' 1\" (1.549 m)   Wt 183 lb 6.8 oz (83.2 kg)   SpO2 97%   BMI 34.66 kg/m²     Current Facility-Administered Medications   Medication Dose Route Frequency    LORazepam (ATIVAN) tablet 0.5 mg  0.5 mg Oral Q8H PRN    pantoprazole (PROTONIX) tablet 40 mg  40 mg Oral DAILY    cetirizine (ZYRTEC) tablet 10 mg  10 mg Oral DAILY    cholecalciferol (VITAMIN D3) (1000 Units /25 mcg) tablet 1 Tab  1,000 Units Oral DAILY    metoprolol succinate (TOPROL-XL) XL tablet 12.5 mg  12.5 mg Oral QHS    ranolazine ER (RANEXA) tablet 500 mg  500 mg Oral BID    0.9% sodium chloride infusion  50 mL/hr IntraVENous CONTINUOUS    acetaminophen (TYLENOL) tablet 650 mg  650 mg Oral Q4H PRN    atropine injection 0.5 mg  0.5 mg IntraVENous Q2.5H PRN    aspirin delayed-release tablet 81 mg  81 mg Oral DAILY    rosuvastatin (CRESTOR) tablet 20 mg  20 mg Oral QHS         Objective:      Visit Vitals  /74   Pulse 82   Temp 97.7 °F (36.5 °C)   Resp 8   Ht 5' 1\" (1.549 m)   Wt 183 lb 6.8 oz (83.2 kg)   SpO2 97%   BMI 34.66 kg/m²       Physical Exam:  Abdomen: soft, non-tender  Extremities: extremities normal  Heart: regular rate and rhythm  Lungs: clear to auscultation bilaterally  Pulses: 2+ and symmetric    Data Review:   Labs:    Recent Labs     05/19/20 2004   WBC 9.3   HGB 12.7   HCT 39.2        Recent Labs     05/21/20  0511 05/19/20 2004    139   K 3.9 4.2   * 107   CO2 24 26   * 138*   BUN 16 30*   CREA 0.89 1.32*   CA 9.4 9.4   MG 2.5* 2.2   ALB  --  3.7   TBILI  --  0.3   SGOT  --  14*   ALT  --  18       Recent Labs     05/19/20 2004   Mariama Tito <0.05 Intake/Output Summary (Last 24 hours) at 5/21/2020 1026  Last data filed at 5/21/2020 7984  Gross per 24 hour   Intake 1730.84 ml   Output 1500 ml   Net 230.84 ml        Telemetry: nsr    Assessment:     Principal Problem:    AV block, 3rd degree ([facility]) (5/20/2020)    Active Problems:    Sick sinus syndrome ([address] (5/19/2020)      Syncope (5/19/2020)      NSVT (nonsustained ventricular tachycardia) ([facility]) (5/20/2020)      S/P placement of cardiac pacemaker (5/20/2020)      Overview: 5/20/2020 Medtronic dual chamber pacemaker implant            S/P cardiac cath (5/20/2020)        Plan:     Jenna Andrade is sp dcppm. cxr interrogation wnl. Feeling well. [id number] [address] for [place].  F/u in 2 weeks    Toby Gongora MD, [facility]    5/21/2020

## 2020-05-21 NOTE — PROGRESS NOTES
CM acknowledged discharge order. [place]  Home  Follow up appointments noted on AVS.  Daughter to transport at time of discharge. Patient is presently on room air. 1056am  CM reviewed discharge planning with patient. Patient will be staying with her daughter through the weekend.      Malini Mendes RN CM  Ext [phone]

## 2020-05-22 ENCOUNTER — PATIENT OUTREACH (OUTPATIENT)
Dept: FAMILY MEDICINE CLINIC | Age: 80
End: 2020-05-22

## 2020-05-29 ENCOUNTER — PATIENT OUTREACH (OUTPATIENT)
Dept: FAMILY MEDICINE CLINIC | Age: 80
End: 2020-05-29

## 2020-06-09 ENCOUNTER — CLINICAL SUPPORT (OUTPATIENT)
Dept: CARDIOLOGY CLINIC | Age: 80
End: 2020-06-09

## 2020-06-09 DIAGNOSIS — Z95.0 PACEMAKER: ICD-10-CM

## 2020-06-09 DIAGNOSIS — I47.29 NSVT (NONSUSTAINED VENTRICULAR TACHYCARDIA): ICD-10-CM

## 2020-06-09 DIAGNOSIS — I49.5 SICK SINUS SYNDROME (HCC): ICD-10-CM

## 2020-06-09 DIAGNOSIS — Z95.0 CARDIAC PACEMAKER IN SITU: Primary | ICD-10-CM

## 2020-06-09 DIAGNOSIS — I44.7 LBBB (LEFT BUNDLE BRANCH BLOCK): Primary | ICD-10-CM

## 2020-06-09 DIAGNOSIS — I44.2 AV BLOCK, 3RD DEGREE (HCC): ICD-10-CM

## 2020-06-09 NOTE — PROGRESS NOTES
Aaron Harmanean  1940  Jocelyn Cool MD          Subjective:    Patient is here for 2 week site check and device interrogation after pacemaker implantation. The patient denies chest pain/shortness of breath or fevers.      Patient Active Problem List    Diagnosis Date Noted    NSVT (nonsustained ventricular tachycardia) ([address] 05/20/2020    AV block, 3rd degree ([address] 05/20/2020    S/P placement of cardiac pacemaker 05/20/2020    S/P cardiac cath 05/20/2020    Sick sinus syndrome ([address] 05/19/2020    Syncope 05/19/2020    IGT (impaired glucose tolerance) 03/28/2017    Encounter for medication monitoring 09/28/2016    Environmental allergies 09/21/2011    Anxiety 03/24/2011    Hypertriglyceridemia 02/23/2010    GERD (gastroesophageal reflux disease) 02/23/2010    OA (osteoarthritis) 02/23/2010    Vitamin D deficiency 02/23/2010      Past Medical History:   Diagnosis Date    AV block, 3rd degree (HCC) 5/20/2020    GERD (gastroesophageal reflux disease) 2/23/2010    Hypertriglyceridemia 2/23/2010    Hypovitaminosis D     NSVT (nonsustained ventricular tachycardia) ([address] 5/20/2020    OA (osteoarthritis) 2/23/2010    Osteoarthritis     S/P cardiac cath 5/20/2020    S/P placement of cardiac pacemaker 5/20/2020 5/20/2020 Medtronic dual chamber pacemaker implant     Vitamin D deficiency 2/23/2010      Past Surgical History:   Procedure Laterality Date    HX ACL RECONSTRUCTION  1998    HX CATARACT REMOVAL  06/2018    right eye    HX HEENT  12/08    cervical neck stenosis s/p cervical release    HX HERNIA REPAIR  4005    umbilical    HX HIP REPLACEMENT  6/2007    right    HX HIP REPLACEMENT  12/2007    left    HX ORTHOPAEDIC  12/2008    cervical release    OR COLONOSCOPY FLX DX W/COLLJ SPEC WHEN PFRMD  06/2006    OR COLONOSCOPY FLX DX W/COLLJ SPEC WHEN PFRMD  08/29/2011    Dr. Kim Pimentel INS NEW/RPLCMT PRM PM W/TRANSV ELTRD ATRIAL&VENT N/A 5/20/2020    Insert Ppm Dual performed by Sj Hooks MD at [facility] CARDIAC CATH LAB     Allergies   Allergen Reactions    Macrobid [Nitrofurantoin Monohyd/M-Cryst] Rash    Pcn [Penicillins] Hives      Family History   Problem Relation Age of Onset    Hypertension Mother     Heart Failure Mother     Cancer Father         colon    Lung Disease Brother     Arthritis-rheumatoid Brother     Heart Disease Brother       Current Outpatient Medications   Medication Sig    metoprolol succinate (TOPROL-XL) 25 mg XL tablet Take 0.5 Tabs by mouth nightly.  rosuvastatin (CRESTOR) 20 mg tablet Take 1 Tab by mouth nightly.  ranolazine ER (RANEXA) 500 mg SR tablet Take 1 Tab by mouth two (2) times a day.  aspirin delayed-release 81 mg tablet Take 1 Tab by mouth daily.  LORazepam (ATIVAN) 1 mg tablet Take 0.5-1 Tabs by mouth every eight (8) hours as needed for Anxiety.  pantoprazole (PROTONIX) 40 mg tablet Take 1 Tab by mouth daily.  cetirizine (ZYRTEC) 10 mg tablet Take 10 mg by mouth daily.  Cholecalciferol, Vitamin D3, (VITAMIN D3) 1,000 unit Cap Take 1 Cap by mouth daily. No current facility-administered medications for this visit. Review of Systems:    General: Pt denies excessive weight gain or loss. Pt is able to conduct ADL's  Respiratory: Denies shortness of breath, MALCOLM, wheezing or stridor. Cardiovascular: Denies precordial pain, palpitations, edema or PND        Physical ExamPhysical Exam:      General: Well developed, in no acute distress. .  Chest: left subclavian pacer site approximated well  Neuro: A&Ox3, speech clear, gait stable. Assessment:        ICD-10-CM ICD-9-CM    1. LBBB (left bundle branch block) I44.7 426.3    2. AV block, 3rd degree (HCC) I44.2 426.0    3. NSVT (nonsustained ventricular tachycardia) ([facility]) I47.2 427.1    4. Pacemaker Z95.0 V45.01         Plan:     Patient feels well following pacemaker implantation.  Left subclavian pacemaker site approximated well, no discharge. No erythema or heat. She is 29.1%AP, 0.3% RVP. No events. Follow up as planned.      Katina Soto, ANP

## 2020-06-17 ENCOUNTER — TELEPHONE (OUTPATIENT)
Dept: FAMILY MEDICINE CLINIC | Age: 80
End: 2020-06-17

## 2020-06-17 NOTE — TELEPHONE ENCOUNTER
----- Message from [employer] sent at 6/17/2020  1:11 PM EDT -----  Regarding: Dr. Emily Cohen first and last name:Milagros Ta      Reason for call: appt on June 23, 2020 in the office      Callback required yes/no and why:yes      Best contact number(s):[phone]      Details to clarify the request:patient wants to know if her appointment on June 23, 2020 is in the office      [facility]

## 2020-06-18 DIAGNOSIS — F41.9 ANXIETY: ICD-10-CM

## 2020-06-18 RX ORDER — LORAZEPAM 1 MG/1
.5-1 TABLET ORAL
Qty: 90 TAB | Refills: 1 | Status: SHIPPED | OUTPATIENT
Start: 2020-06-18 | End: 2021-02-17 | Stop reason: SDUPTHER

## 2020-06-18 NOTE — TELEPHONE ENCOUNTER
Verified patient with two types of identifiers. Wanted to check to see if her appt was a VV or in office. Advised her that this will be a VV for now and that we will call if this changes.

## 2020-06-18 NOTE — TELEPHONE ENCOUNTER
Request for Lorazepam 1mg PRN. Last office visit 1/3/20, next office visit 6/23/20.  Refill pending for provider approval.

## 2020-06-30 ENCOUNTER — OFFICE VISIT (OUTPATIENT)
Dept: CARDIOLOGY CLINIC | Age: 80
End: 2020-06-30

## 2020-06-30 VITALS
HEIGHT: 61 IN | HEART RATE: 79 BPM | BODY MASS INDEX: 33.12 KG/M2 | SYSTOLIC BLOOD PRESSURE: 142 MMHG | RESPIRATION RATE: 16 BRPM | DIASTOLIC BLOOD PRESSURE: 80 MMHG | WEIGHT: 175.4 LBS | OXYGEN SATURATION: 96 %

## 2020-06-30 DIAGNOSIS — I10 ESSENTIAL HYPERTENSION: ICD-10-CM

## 2020-06-30 DIAGNOSIS — I47.29 NSVT (NONSUSTAINED VENTRICULAR TACHYCARDIA): ICD-10-CM

## 2020-06-30 DIAGNOSIS — I25.10 CORONARY ARTERY DISEASE INVOLVING NATIVE CORONARY ARTERY OF NATIVE HEART WITHOUT ANGINA PECTORIS: Primary | ICD-10-CM

## 2020-06-30 DIAGNOSIS — E78.00 HIGH CHOLESTEROL: ICD-10-CM

## 2020-06-30 DIAGNOSIS — I49.5 SICK SINUS SYNDROME (HCC): ICD-10-CM

## 2020-06-30 DIAGNOSIS — I44.2 AV BLOCK, 3RD DEGREE (HCC): ICD-10-CM

## 2020-06-30 RX ORDER — GEMFIBROZIL 600 MG/1
600 TABLET, FILM COATED ORAL
COMMUNITY
Start: 2019-10-02 | End: 2020-10-01

## 2020-06-30 RX ORDER — METOPROLOL SUCCINATE 25 MG/1
25 TABLET, EXTENDED RELEASE ORAL
Qty: 90 TAB | Refills: 2 | Status: SHIPPED | OUTPATIENT
Start: 2020-06-30 | End: 2020-07-01 | Stop reason: SDUPTHER

## 2020-06-30 NOTE — PROGRESS NOTES
Subjective/HPI:     Henrry Loomis is a 67 y.o. female is here for f/u appt after hospital admission 5/20/20 for c/o dizziness/lightheadedness, SOB, and palpitations. She was found to have 3rd HB, and multiple episodes of NSVT. She has a hx of 12/19/2019 cardiac cath with a 80% D2 and 40% RCA and had a cardiac cath during this admission which did not show obstructive CAD, EF 55-60%. She had permanent pacemaker implantation. Since d/c her daughter who is a nurse checked her pulse and said it was irregular. This has improved. She has been monitoring her BP, has had SBP trending 130-140s, DBP 70s. The patient denies chest pain/ shortness of breath, orthopnea, PND, LE edema, syncope, presyncope or fatigue.          PCP Provider  Ivelisse Forman MD  Past Medical History:   Diagnosis Date    AV block, 3rd degree ([address] 5/20/2020    GERD (gastroesophageal reflux disease) 2/23/2010    Hypertriglyceridemia 2/23/2010    Hypovitaminosis D     NSVT (nonsustained ventricular tachycardia) ([facility] 5/20/2020    OA (osteoarthritis) 2/23/2010    Osteoarthritis     S/P cardiac cath 5/20/2020    S/P placement of cardiac pacemaker 5/20/2020 5/20/2020 Medtronic dual chamber pacemaker implant     Vitamin D deficiency 2/23/2010      Past Surgical History:   Procedure Laterality Date    HX ACL RECONSTRUCTION  1998    HX CATARACT REMOVAL  06/2018    right eye    HX HEENT  12/08    cervical neck stenosis s/p cervical release    HX HERNIA REPAIR  0493    umbilical    HX HIP REPLACEMENT  6/2007    right    HX HIP REPLACEMENT  12/2007    left    HX ORTHOPAEDIC  12/2008    cervical release    MS COLONOSCOPY FLX DX W/COLLJ SPEC WHEN PFRMD  06/2006    MS COLONOSCOPY FLX DX W/COLLJ SPEC WHEN PFRMD  08/29/2011    Dr. Nataly Torres INS NEW/RPLCMT PRM PM W/TRANSV ELTRD ATRIAL&VENT N/A 5/20/2020    Insert Ppm Dual performed by Rylie Garcia MD at [address]  CARDIAC CATH LAB     Allergies   Allergen Reactions    Macrobid [Nitrofurantoin Monohyd/M-Cryst] Rash    Pcn [Penicillins] Hives      Family History   Problem Relation Age of Onset    Hypertension Mother     Heart Failure Mother     Cancer Father         colon    Lung Disease Brother     Arthritis-rheumatoid Brother     Heart Disease Brother       Current Outpatient Medications   Medication Sig    LORazepam (ATIVAN) 1 mg tablet Take 0.5-1 Tabs by mouth every eight (8) hours as needed for Anxiety.  metoprolol succinate (TOPROL-XL) 25 mg XL tablet Take 0.5 Tabs by mouth nightly.  rosuvastatin (CRESTOR) 20 mg tablet Take 1 Tab by mouth nightly.  ranolazine ER (RANEXA) 500 mg SR tablet Take 1 Tab by mouth two (2) times a day.  aspirin delayed-release 81 mg tablet Take 1 Tab by mouth daily.  pantoprazole (PROTONIX) 40 mg tablet Take 1 Tab by mouth daily.  cetirizine (ZYRTEC) 10 mg tablet Take 10 mg by mouth daily.  Cholecalciferol, Vitamin D3, (VITAMIN D3) 1,000 unit Cap Take 1 Cap by mouth daily.  gemfibroziL (LOPID) 600 mg tablet Take 600 mg by mouth. No current facility-administered medications for this visit.        Vitals:    20 1108 20 1124   BP: 148/76 142/80   Pulse: 79    Resp: 16    SpO2: 96%    Weight: 175 lb 6.4 oz (79.6 kg)    Height: 5' 1\" (1.549 m)      Social History     Socioeconomic History    Marital status:      Spouse name: Not on file    Number of children: Not on file    Years of education: Not on file    Highest education level: Not on file   Occupational History    Not on file   Social Needs    Financial resource strain: Not on file    Food insecurity     Worry: Not on file     Inability: Not on file    Transportation needs     Medical: Not on file     Non-medical: Not on file   Tobacco Use    Smoking status: Former Smoker     Packs/day: 0.25     Years: 55.00     Pack years: 13.75     Last attempt to quit: 2019     Years since quittin.6    Smokeless tobacco: Never Used   Substance and Sexual Activity    Alcohol use: Yes     Alcohol/week: 0.0 standard drinks     Comment: 1 drink a month    Drug use: No    Sexual activity: Not Currently   Lifestyle    Physical activity     Days per week: Not on file     Minutes per session: Not on file    Stress: Not on file   Relationships    Social connections     Talks on phone: Not on file     Gets together: Not on file     Attends Judaism service: Not on file     Active member of club or organization: Not on file     Attends meetings of clubs or organizations: Not on file     Relationship status: Not on file    Intimate partner violence     Fear of current or ex partner: Not on file     Emotionally abused: Not on file     Physically abused: Not on file     Forced sexual activity: Not on file   Other Topics Concern    Not on file   Social History Narrative    Not on file       I have reviewed the nurses notes, vitals, problem list, allergy list, medical history, family, social history and medications. Review of Symptoms:    General: Pt denies excessive weight gain or loss. Pt is able to conduct ADL's  HEENT: Denies blurred vision, headaches, epistaxis and difficulty swallowing. Respiratory: Denies shortness of breath, MALCOLM, wheezing or stridor. Cardiovascular: Denies precordial pain, palpitations, edema or PND  Gastrointestinal: Denies poor appetite, indigestion, abdominal pain or blood in stool  Urinary: Denies dysuria, pyuria  Musculoskeletal: Denies pain or swelling from muscles or joints  Neurologic: Denies tremor, paresthesias, or sensory motor disturbance  Skin: Denies rash, itching or texture change. Psych: Denies depression        Physical Exam:      General: Well developed, in no acute distress, cooperative and alert  HEENT: No carotid bruits, no JVD, trach is midline. Neck Supple, PEERL, EOM intact. Heart:  Normal S1/S2 negative S3 or S4. Regular, no murmur, gallop or rub.  left pacemaker site intact  Respiratory: Clear bilaterally x 4, no wheezing or rales  Abdomen:   Soft, non-tender, no masses, bowel sounds are active. Extremities:  No edema, normal cap refill, no cyanosis, atraumatic. Neuro: A&Ox3, speech clear, gait stable. Skin: Skin color is normal. No rashes or lesions. Non diaphoretic  Vascular: 2+ pulses symmetric in all extremities    Cardiographics    ECG: Sinus  Rhythm  -First degree A-V block   -Left bundle branch block and left axis. Results for orders placed or performed during the hospital encounter of 05/19/20   EKG, 12 LEAD, INITIAL   Result Value Ref Range    Ventricular Rate 132 BPM    Atrial Rate 133 BPM    QRS Duration 144 ms    Q-T Interval 372 ms    QTC Calculation (Bezet) 551 ms    Calculated R Axis -65 degrees    Calculated T Axis 86 degrees    Diagnosis       Wide QRS tachycardia  Left axis deviation  Left bundle branch block    Confirmed by Elias Webb ([id number]) on 5/20/2020 9:13:03 AM           Cardiology Labs:  Lab Results   Component Value Date/Time    Cholesterol, total 219 (H) 10/02/2019 11:28 AM    HDL Cholesterol 52 10/02/2019 11:28 AM    LDL, calculated 138 (H) 10/02/2019 11:28 AM    LDL, calculated 118 (H) 04/02/2019 08:43 AM    LDL, calculated 123 (H) 04/02/2018 08:31 AM    VLDL, calculated 29 10/02/2019 11:28 AM    CHOL/HDL Ratio 3.3 09/22/2010 10:02 AM     Lab Results   Component Value Date/Time    Sodium 140 05/21/2020 05:11 AM    Potassium 3.9 05/21/2020 05:11 AM    Chloride 111 (H) 05/21/2020 05:11 AM    CO2 24 05/21/2020 05:11 AM    Glucose 115 (H) 05/21/2020 05:11 AM    BUN 16 05/21/2020 05:11 AM    Creatinine 0.89 05/21/2020 05:11 AM    BUN/Creatinine ratio 18 05/21/2020 05:11 AM    GFR est AA >60 05/21/2020 05:11 AM    GFR est non-AA >60 05/21/2020 05:11 AM    Calcium 9.4 05/21/2020 05:11 AM    Anion gap 5 05/21/2020 05:11 AM    Bilirubin, total 0.3 05/19/2020 08:04 PM    ALT (SGPT) 18 05/19/2020 08:04 PM    Alk.  phosphatase 106 05/19/2020 08:04 PM    Protein, total 6.7 05/19/2020 08:04 PM    Albumin 3.7 05/19/2020 08:04 PM    Globulin 3.0 05/19/2020 08:04 PM    A-G Ratio 1.2 05/19/2020 08:04 PM     Lab Results   Component Value Date/Time    Hemoglobin A1c 6.2 (H) 03/27/2013 08:49 AM    Hemoglobin A1c (POC) 5.8 10/02/2019 11:28 AM    Hemoglobin A1c, External 6.2 06/26/2015        Assessment:     Assessment:     Diagnoses and all orders for this visit:    1. Coronary artery disease involving native coronary artery of native heart without angina pectoris    2. NSVT (nonsustained ventricular tachycardia) ([facility]    3. LBBB (left bundle branch block)    4. AV block, 3rd degree (HCC)    5. Sick sinus syndrome ([facility]    6. High cholesterol  -     AMB POC EKG ROUTINE W/ 12 LEADS, INTER & REP        ICD-10-CM ICD-9-CM    1. Coronary artery disease involving native coronary artery of native heart without angina pectoris I25.10 414.01    2. NSVT (nonsustained ventricular tachycardia) (HCC) I47.2 427.1    3. LBBB (left bundle branch block) I44.7 426.3    4. AV block, 3rd degree (HCC) I44.2 426.0    5. Sick sinus syndrome (HCC) I49.5 427.81    6. High cholesterol E78.00 272.0 AMB POC EKG ROUTINE W/ 12 LEADS, INTER & REP          Plan:     1. Coronary artery disease involving native coronary artery of native heart without angina pectoris  Asymptomatic. Cardiac cath 5/20 required no intervention. Cont BB, statin, ASA    2. NSVT (nonsustained ventricular tachycardia) (HCC)  No obstructive CAD seen on cath. EKG SR. Cont BB    3. HTN  Trending elevated. Increase toprol to 25mg qd    4. AV block, 3rd degree (HCC)  Post pacemaker    5. Sick sinus syndrome [facility])  Pacemaker implanted with site c/d/i. F/U device check with appropriate function. Managed by Dr Aurelia Marley    6. High cholesterol  On Crestor. Repeat CMP,CK, and lipids now. F/U in 6 months. Kishor Vidales NP       [facility]    6/30/2020         Patient seen, examined by me personally.  Plan discussed as detailed. Agree with note as outlined by  NP. I confirm findings in history and physical exam. No additional findings noted. Agree with plan as outlined above.      Cheng Guido MD

## 2020-06-30 NOTE — PROGRESS NOTES
1. Have you been to the ER, urgent care clinic since your last visit? Hospitalized since your last visit? Yes When: on 5/19/20 for AV block    2. Have you seen or consulted any other health care providers outside of the [address] [place] [address] since your last visit? Include any pap smears or colon screening. No    Chief Complaint   Patient presents with   [facility] Follow Up     for AV block     Pt denies cardiac complaints.

## 2020-06-30 NOTE — LETTER
6/30/20 Patient: Kat Blackmon YOB: 1940 Date of Visit: 6/30/2020 Lev Thakkar MD 
[address] [place] [id number] VIA In Basket Dear Lev Thakkar MD, Thank you for referring Ms. Yasmin Reyes to [address] [place] [address] for evaluation. My notes for this consultation are attached. If you have questions, please do not hesitate to call me. I look forward to following your patient along with you. Sincerely, Mandi Jarrett MD

## 2020-07-01 ENCOUNTER — TELEPHONE (OUTPATIENT)
Dept: CARDIOLOGY CLINIC | Age: 80
End: 2020-07-01

## 2020-07-01 RX ORDER — ROSUVASTATIN CALCIUM 20 MG/1
20 TABLET, COATED ORAL
Qty: 90 TAB | Refills: 0 | Status: SHIPPED | OUTPATIENT
Start: 2020-07-01 | End: 2020-12-29 | Stop reason: SDUPTHER

## 2020-07-01 RX ORDER — METOPROLOL SUCCINATE 25 MG/1
25 TABLET, EXTENDED RELEASE ORAL
Qty: 90 TAB | Refills: 2 | Status: SHIPPED | OUTPATIENT
Start: 2020-07-01 | End: 2020-12-29 | Stop reason: SDUPTHER

## 2020-07-01 RX ORDER — RANOLAZINE 500 MG/1
500 TABLET, EXTENDED RELEASE ORAL 2 TIMES DAILY
Qty: 180 TAB | Refills: 3 | Status: SHIPPED | OUTPATIENT
Start: 2020-07-01 | End: 2020-12-29 | Stop reason: SDUPTHER

## 2020-07-01 RX ORDER — ASPIRIN 81 MG/1
81 TABLET ORAL DAILY
Qty: 30 TAB | Refills: 5 | Status: SHIPPED | OUTPATIENT
Start: 2020-07-01

## 2020-07-01 NOTE — TELEPHONE ENCOUNTER
Please resend prescriptions to correct pharmacy.     Should be  [address] in [address] [facility]

## 2020-07-22 ENCOUNTER — HOSPITAL ENCOUNTER (OUTPATIENT)
Dept: LAB | Age: 80
Discharge: HOME | End: 2020-07-22
Payer: MEDICARE

## 2020-07-22 PROCEDURE — 80061 LIPID PANEL: CPT

## 2020-07-22 PROCEDURE — 36415 COLL VENOUS BLD VENIPUNCTURE: CPT

## 2020-07-22 PROCEDURE — 82550 ASSAY OF CK (CPK): CPT

## 2020-07-22 PROCEDURE — 80053 COMPREHEN METABOLIC PANEL: CPT

## 2020-07-23 LAB
ALBUMIN SERPL-MCNC: 4.5 G/DL (ref 3.7–4.7)
ALBUMIN/GLOB SERPL: 2.1 {RATIO} (ref 1.2–2.2)
ALP SERPL-CCNC: 114 IU/L (ref 39–117)
ALT SERPL-CCNC: 18 IU/L (ref 0–32)
AST SERPL-CCNC: 20 IU/L (ref 0–40)
BILIRUB SERPL-MCNC: 0.5 MG/DL (ref 0–1.2)
BUN SERPL-MCNC: 14 MG/DL (ref 8–27)
BUN/CREAT SERPL: 14 (ref 12–28)
CALCIUM SERPL-MCNC: 10.1 MG/DL (ref 8.7–10.3)
CHLORIDE SERPL-SCNC: 100 MMOL/L (ref 96–106)
CHOLEST SERPL-MCNC: 171 MG/DL (ref 100–199)
CK SERPL-CCNC: 87 U/L (ref 32–182)
CO2 SERPL-SCNC: 23 MMOL/L (ref 20–29)
CREAT SERPL-MCNC: 1.01 MG/DL (ref 0.57–1)
GLOBULIN SER CALC-MCNC: 2.1 G/DL (ref 1.5–4.5)
GLUCOSE SERPL-MCNC: 107 MG/DL (ref 65–99)
HDLC SERPL-MCNC: 49 MG/DL
INTERPRETATION, 910389: NORMAL
INTERPRETATION: NORMAL
LDLC SERPL CALC-MCNC: 70 MG/DL (ref 0–99)
PDF IMAGE, 910387: NORMAL
POTASSIUM SERPL-SCNC: 4.8 MMOL/L (ref 3.5–5.2)
PROT SERPL-MCNC: 6.6 G/DL (ref 6–8.5)
SODIUM SERPL-SCNC: 140 MMOL/L (ref 134–144)
TRIGL SERPL-MCNC: 261 MG/DL (ref 0–149)
VLDLC SERPL CALC-MCNC: 52 MG/DL (ref 5–40)

## 2020-07-23 NOTE — PROGRESS NOTES
Please let the patient know that her liver, kidneys and electrolytes are good. Her bad cholesterol is much improved, now at goal at 70. Her good cholesterol is 49 which is at goal. Her trig are high at 261, indicates typically dietary indiscretions/high blood sugar. Work on diet/exercise as tolerated.

## 2020-07-23 NOTE — PROGRESS NOTES
Spoke to patient using 2 identifiers. Per Chuyita Garcia NP, patient was made aware that her liver, kidneys and electrolytes are good. Her bad cholesterol is much improved, now at goal at 70. Her good cholesterol is 49 which is at goal. Her trig are high at 261, indicates typically dietary indiscretions/high blood sugar. Work on diet/exercise as tolerated. Patient verbalized understanding.

## 2020-08-27 ENCOUNTER — TELEPHONE (OUTPATIENT)
Dept: CARDIOLOGY CLINIC | Age: 80
End: 2020-08-27

## 2020-08-28 NOTE — TELEPHONE ENCOUNTER
Verified patient with 2 identifiers   Patient states when her daughter (RN) checked patient pulse and noticed it skipped beats. Patient denies chest pain, sob or other cardiac sx. Patient does have a dx of sick sinus syndrome - advised that pauses in the pulse is a symptom of SSS. Patient is scheduled for a follow up with Dr Elma Lawton on 9/15/20. Advised if she exp chest pain or extreme sob to go to the ER. Patient agrees.

## 2020-09-15 ENCOUNTER — OFFICE VISIT (OUTPATIENT)
Dept: CARDIOLOGY CLINIC | Age: 80
End: 2020-09-15
Payer: MEDICARE

## 2020-09-15 ENCOUNTER — CLINICAL SUPPORT (OUTPATIENT)
Dept: CARDIOLOGY CLINIC | Age: 80
End: 2020-09-15
Payer: MEDICARE

## 2020-09-15 VITALS
HEART RATE: 69 BPM | DIASTOLIC BLOOD PRESSURE: 72 MMHG | SYSTOLIC BLOOD PRESSURE: 132 MMHG | BODY MASS INDEX: 32.85 KG/M2 | WEIGHT: 174 LBS | RESPIRATION RATE: 18 BRPM | OXYGEN SATURATION: 97 % | HEIGHT: 61 IN

## 2020-09-15 DIAGNOSIS — R55 SYNCOPE AND COLLAPSE: ICD-10-CM

## 2020-09-15 DIAGNOSIS — I49.5 SICK SINUS SYNDROME (HCC): Primary | ICD-10-CM

## 2020-09-15 DIAGNOSIS — I49.5 SICK SINUS SYNDROME (HCC): ICD-10-CM

## 2020-09-15 DIAGNOSIS — I10 ESSENTIAL HYPERTENSION: ICD-10-CM

## 2020-09-15 DIAGNOSIS — I44.2 AV BLOCK, 3RD DEGREE (HCC): ICD-10-CM

## 2020-09-15 DIAGNOSIS — E78.2 MIXED HYPERLIPIDEMIA: ICD-10-CM

## 2020-09-15 DIAGNOSIS — Z95.0 CARDIAC PACEMAKER IN SITU: Primary | ICD-10-CM

## 2020-09-15 PROCEDURE — G0463 HOSPITAL OUTPT CLINIC VISIT: HCPCS | Performed by: INTERNAL MEDICINE

## 2020-09-15 PROCEDURE — 93005 ELECTROCARDIOGRAM TRACING: CPT | Performed by: INTERNAL MEDICINE

## 2020-09-15 PROCEDURE — G8427 DOCREV CUR MEDS BY ELIG CLIN: HCPCS | Performed by: INTERNAL MEDICINE

## 2020-09-15 PROCEDURE — 93010 ELECTROCARDIOGRAM REPORT: CPT | Performed by: INTERNAL MEDICINE

## 2020-09-15 PROCEDURE — G8536 NO DOC ELDER MAL SCRN: HCPCS | Performed by: INTERNAL MEDICINE

## 2020-09-15 PROCEDURE — 99214 OFFICE O/P EST MOD 30 MIN: CPT | Performed by: INTERNAL MEDICINE

## 2020-09-15 PROCEDURE — G8399 PT W/DXA RESULTS DOCUMENT: HCPCS | Performed by: INTERNAL MEDICINE

## 2020-09-15 PROCEDURE — 93280 PM DEVICE PROGR EVAL DUAL: CPT | Performed by: INTERNAL MEDICINE

## 2020-09-15 PROCEDURE — G8432 DEP SCR NOT DOC, RNG: HCPCS | Performed by: INTERNAL MEDICINE

## 2020-09-15 PROCEDURE — 1090F PRES/ABSN URINE INCON ASSESS: CPT | Performed by: INTERNAL MEDICINE

## 2020-09-15 PROCEDURE — G8752 SYS BP LESS 140: HCPCS | Performed by: INTERNAL MEDICINE

## 2020-09-15 PROCEDURE — 1101F PT FALLS ASSESS-DOCD LE1/YR: CPT | Performed by: INTERNAL MEDICINE

## 2020-09-15 PROCEDURE — G8417 CALC BMI ABV UP PARAM F/U: HCPCS | Performed by: INTERNAL MEDICINE

## 2020-09-15 PROCEDURE — G8754 DIAS BP LESS 90: HCPCS | Performed by: INTERNAL MEDICINE

## 2020-09-15 NOTE — PROGRESS NOTES
Chief Complaint   Patient presents with    Pacemaker Check     PMI on 5/20/20 - here for follow up     Shortness of Breath     upon extreme exertion      1. Have you been to the ER, urgent care clinic since your last visit? Hospitalized since your last visit? No     2. Have you seen or consulted any other health care providers outside of the [address] [place] [address] since your last visit? Include any pap smears or colon screening.   No

## 2020-09-15 NOTE — LETTER
9/15/20 Patient: Kecia Valadez YOB: 1940 Date of Visit: 9/15/2020 Zandra Riley MD 
[address] [place] [id number] VIA In Basket Dear Zandra Riley MD, Thank you for referring Ms. Madi Goodman to [address] [place], [place] [address] for evaluation. My notes for this consultation are attached. If you have questions, please do not hesitate to call me. I look forward to following your patient along with you. Sincerely, Carolina Carr MD

## 2020-09-15 NOTE — PROGRESS NOTES
Subjective:      Anny Suarez is a 79 y.o. female is here for EP consult. Here for device follow up. The patient denies chest pain/ shortness of breath, orthopnea, PND, LE edema, palpitations, syncope, presyncope or fatigue.        Patient Active Problem List    Diagnosis Date Noted    NSVT (nonsustained ventricular tachycardia) ([address] 05/20/2020    AV block, 3rd degree ([address] 05/20/2020    S/P placement of cardiac pacemaker 05/20/2020    S/P cardiac cath 05/20/2020    Sick sinus syndrome ([address] 05/19/2020    Syncope 05/19/2020    IGT (impaired glucose tolerance) 03/28/2017    Encounter for medication monitoring 09/28/2016    Environmental allergies 09/21/2011    Anxiety 03/24/2011    Hypertriglyceridemia 02/23/2010    GERD (gastroesophageal reflux disease) 02/23/2010    OA (osteoarthritis) 02/23/2010    Vitamin D deficiency 02/23/2010      Hollis Christianson MD  Past Medical History:   Diagnosis Date    AV block, 3rd degree ([address] 5/20/2020    GERD (gastroesophageal reflux disease) 2/23/2010    Hypertriglyceridemia 2/23/2010    Hypovitaminosis D     NSVT (nonsustained ventricular tachycardia) ([address] 5/20/2020    OA (osteoarthritis) 2/23/2010    Osteoarthritis     S/P cardiac cath 5/20/2020    S/P placement of cardiac pacemaker 5/20/2020 5/20/2020 Medtronic dual chamber pacemaker implant     Vitamin D deficiency 2/23/2010      Past Surgical History:   Procedure Laterality Date    HX ACL RECONSTRUCTION  1998    HX CATARACT REMOVAL  06/2018    right eye    HX HEENT  12/08    cervical neck stenosis s/p cervical release    HX HERNIA REPAIR  5569    umbilical    HX HIP REPLACEMENT  6/2007    right    HX HIP REPLACEMENT  12/2007    left    HX ORTHOPAEDIC  12/2008    cervical release    CT COLONOSCOPY FLX DX W/COLLJ SPEC WHEN PFRMD  06/2006    CT COLONOSCOPY FLX DX W/COLLJ SPEC WHEN PFRMD  08/29/2011    Dr. Emmanuel Kemp INS NEW/RPLCMT PRM PM W/TRANSV ELTRD ATRIAL&VENT N/A 2020    Insert Ppm Dual performed by Mark Villanueva MD at [facility] CARDIAC CATH LAB     Allergies   Allergen Reactions    Macrobid [Nitrofurantoin Monohyd/M-Cryst] Rash    Pcn [Penicillins] Hives      Family History   Problem Relation Age of Onset    Hypertension Mother     Heart Failure Mother     Cancer Father         colon    Lung Disease Brother     Arthritis-rheumatoid Brother     Heart Disease Brother     negative for cardiac disease  Social History     Socioeconomic History    Marital status:      Spouse name: Not on file    Number of children: Not on file    Years of education: Not on file    Highest education level: Not on file   Tobacco Use    Smoking status: Former Smoker     Packs/day: 0.25     Years: 55.00     Pack years: 13.75     Last attempt to quit: 2019     Years since quittin.8    Smokeless tobacco: Never Used   Substance and Sexual Activity    Alcohol use: Yes     Alcohol/week: 0.0 standard drinks     Comment: 1 drink a month    Drug use: No    Sexual activity: Not Currently     Current Outpatient Medications   Medication Sig    aspirin delayed-release 81 mg tablet Take 1 Tab by mouth daily.  metoprolol succinate (TOPROL-XL) 25 mg XL tablet Take 1 Tab by mouth nightly.  rosuvastatin (CRESTOR) 20 mg tablet Take 1 Tab by mouth nightly.  ranolazine ER (Ranexa) 500 mg SR tablet Take 1 Tab by mouth two (2) times a day.  LORazepam (ATIVAN) 1 mg tablet Take 0.5-1 Tabs by mouth every eight (8) hours as needed for Anxiety.  pantoprazole (PROTONIX) 40 mg tablet Take 1 Tab by mouth daily.  cetirizine (ZYRTEC) 10 mg tablet Take 10 mg by mouth daily.  Cholecalciferol, Vitamin D3, (VITAMIN D3) 1,000 unit Cap Take 1 Cap by mouth daily.  gemfibroziL (LOPID) 600 mg tablet Take 600 mg by mouth. No current facility-administered medications for this visit.        Vitals:    09/15/20 1120   BP: 132/72   Pulse: 69   Resp: 18   SpO2: 97%   Weight: 174 lb (78.9 kg)   Height: 5' 1\" (1.549 m)       I have reviewed the nurses notes, vitals, problem list, allergy list, medical history, family, social history and medications. Review of Symptoms:    General: Pt denies excessive weight gain or loss. Pt is able to conduct ADL's  HEENT: Denies blurred vision, headaches, hearing loss, epistaxis and difficulty swallowing. Respiratory: Denies cough, congestion, shortness of breath, MALCOLM, wheezing or stridor. Cardiovascular: Denies precordial pain, palpitations, edema or PND  Gastrointestinal: Denies poor appetite, indigestion, abdominal pain or blood in stool  Genitourinary: Denies hematuria, dysuria, increased urinary frequency  Musculoskeletal: Denies joint pain or swelling from muscles or joints  Neurologic: Denies tremor, paresthesias, headache, or sensory motor disturbance  Psychiatric: Denies confusion, insomnia, depression  Integumentray: Denies rash, itching or ulcers. Hematologic: Denies easy bruising, bleeding    Physical Exam:      General: Well developed, in no acute distress. HEENT: Eyes - PERRL, no jvd  Heart:  Normal S1/S2 negative S3 or S4. Regular, no murmur, gallop or rub. Respiratory: Clear bilaterally x 4, no wheezing or rales  Abdomen:   Soft, non-tender, bowel sounds are active. Extremities:  No edema, normal cap refill, no cyanosis. Musculoskeletal: No clubbing  Neuro: A&Ox3, speech clear, gait stable. Skin: Skin color is normal. No rashes or lesions.  Non diaphoretic, no ulcers or subcutaneous nodule  Vascular: 2+ pulses symmetric in all extremities  Psych - judgement intact and orientation is wnl     Cardiographics    Ekg: nsr    Pacer - A paced 43%    Results for orders placed or performed during the hospital encounter of 05/19/20   EKG, 12 LEAD, INITIAL   Result Value Ref Range    Ventricular Rate 132 BPM    Atrial Rate 133 BPM    QRS Duration 144 ms    Q-T Interval 372 ms    QTC Calculation (Bezet) 551 ms    Calculated R Axis -65 degrees    Calculated T Axis 86 degrees    Diagnosis       Wide QRS tachycardia  Left axis deviation  Left bundle branch block    Confirmed by Elizabeth Carrillo ([id number]) on 5/20/2020 9:13:03 AM           Lab Results   Component Value Date/Time    WBC 9.3 05/19/2020 08:04 PM    HGB 12.7 05/19/2020 08:04 PM    HCT 39.2 05/19/2020 08:04 PM    PLATELET 380 39/77/0496 08:04 PM    MCV 88.3 05/19/2020 08:04 PM      Lab Results   Component Value Date/Time    Sodium 140 07/22/2020 10:57 AM    Potassium 4.8 07/22/2020 10:57 AM    Chloride 100 07/22/2020 10:57 AM    CO2 23 07/22/2020 10:57 AM    Anion gap 5 05/21/2020 05:11 AM    Glucose 107 (H) 07/22/2020 10:57 AM    BUN 14 07/22/2020 10:57 AM    Creatinine 1.01 (H) 07/22/2020 10:57 AM    BUN/Creatinine ratio 14 07/22/2020 10:57 AM    GFR est AA 62 07/22/2020 10:57 AM    GFR est non-AA 53 (L) 07/22/2020 10:57 AM    Calcium 10.1 07/22/2020 10:57 AM    Bilirubin, total 0.5 07/22/2020 10:57 AM    Alk. phosphatase 114 07/22/2020 10:57 AM    Protein, total 6.6 07/22/2020 10:57 AM    Albumin 4.5 07/22/2020 10:57 AM    Globulin 3.0 05/19/2020 08:04 PM    A-G Ratio 2.1 07/22/2020 10:57 AM    ALT (SGPT) 18 07/22/2020 10:57 AM         Assessment:     Assessment:        ICD-10-CM ICD-9-CM    1. Sick sinus syndrome (HCC)  I49.5 427.81 AMB POC EKG ROUTINE W/ 12 LEADS, INTER & REP   2. Essential hypertension  I10 401.9    3. Syncope and collapse  R55 780.2    4. Mixed hyperlipidemia  E78.2 272.2      Orders Placed This Encounter    AMB POC EKG ROUTINE W/ 12 LEADS, INTER & REP     Order Specific Question:   Reason for Exam:     Answer:   routine        Plan:   Becky Degroot is doing well. She is A paced 43% for sick sinus and asymptomatic. Cont med rx for htn and hyperlipidemia. No further syncope. Becky Degroot will follow up with me in one year and in the device clinic per routine. Continue medical management for sss, htn and hyperlipidemia.     Thank you for allowing me to participate in Toy Lose 's care.     Phyliss Rinne, MD, Joaquim Mcdaniels

## 2020-12-16 ENCOUNTER — TELEPHONE (OUTPATIENT)
Dept: CARDIOLOGY CLINIC | Age: 80
End: 2020-12-16

## 2020-12-16 NOTE — TELEPHONE ENCOUNTER
Per Dr. Grant Evans, we usually don't need 6 mo labs unless something was abnormal.    Only TG was elevated. Do we need any labs now or wait for one year? Pls advise.

## 2020-12-16 NOTE — TELEPHONE ENCOUNTER
Since her LDL was at goal we will wait until July to repeat labs again. Keep up with her meds, diet, exercise to help her trigs.

## 2020-12-16 NOTE — TELEPHONE ENCOUNTER
Spoke with patient. Verified patient with two patient identifiers. Advised labs not needed yet. Watch diet, ^ exercise, continue all meds. Will Recheck at yearly appt. Patient verbalized understanding.

## 2020-12-16 NOTE — TELEPHONE ENCOUNTER
Patient would like to get labs done prior to appointment can she please  labslip.     Thanks  Yadira Rojas

## 2020-12-21 ENCOUNTER — OFFICE VISIT (OUTPATIENT)
Dept: CARDIOLOGY CLINIC | Age: 80
End: 2020-12-21
Payer: MEDICARE

## 2020-12-21 DIAGNOSIS — I49.5 SICK SINUS SYNDROME (HCC): ICD-10-CM

## 2020-12-21 DIAGNOSIS — Z95.0 CARDIAC PACEMAKER IN SITU: Primary | ICD-10-CM

## 2020-12-21 PROCEDURE — 93296 REM INTERROG EVL PM/IDS: CPT | Performed by: INTERNAL MEDICINE

## 2020-12-21 PROCEDURE — 93294 REM INTERROG EVL PM/LDLS PM: CPT | Performed by: INTERNAL MEDICINE

## 2020-12-29 ENCOUNTER — OFFICE VISIT (OUTPATIENT)
Dept: CARDIOLOGY CLINIC | Age: 80
End: 2020-12-29
Payer: MEDICARE

## 2020-12-29 VITALS
OXYGEN SATURATION: 98 % | TEMPERATURE: 97.8 F | SYSTOLIC BLOOD PRESSURE: 132 MMHG | HEART RATE: 88 BPM | RESPIRATION RATE: 18 BRPM | DIASTOLIC BLOOD PRESSURE: 80 MMHG | HEIGHT: 61 IN | BODY MASS INDEX: 33.99 KG/M2 | WEIGHT: 180 LBS

## 2020-12-29 DIAGNOSIS — I49.5 SICK SINUS SYNDROME (HCC): ICD-10-CM

## 2020-12-29 DIAGNOSIS — Z95.0 CARDIAC PACEMAKER IN SITU: ICD-10-CM

## 2020-12-29 DIAGNOSIS — I10 ESSENTIAL HYPERTENSION: ICD-10-CM

## 2020-12-29 DIAGNOSIS — I25.10 CORONARY ARTERY DISEASE INVOLVING NATIVE CORONARY ARTERY OF NATIVE HEART WITHOUT ANGINA PECTORIS: Primary | ICD-10-CM

## 2020-12-29 DIAGNOSIS — I47.29 NSVT (NONSUSTAINED VENTRICULAR TACHYCARDIA): ICD-10-CM

## 2020-12-29 DIAGNOSIS — E78.2 MIXED HYPERLIPIDEMIA: ICD-10-CM

## 2020-12-29 PROCEDURE — 93010 ELECTROCARDIOGRAM REPORT: CPT | Performed by: INTERNAL MEDICINE

## 2020-12-29 PROCEDURE — G8752 SYS BP LESS 140: HCPCS | Performed by: INTERNAL MEDICINE

## 2020-12-29 PROCEDURE — G8432 DEP SCR NOT DOC, RNG: HCPCS | Performed by: INTERNAL MEDICINE

## 2020-12-29 PROCEDURE — G8536 NO DOC ELDER MAL SCRN: HCPCS | Performed by: INTERNAL MEDICINE

## 2020-12-29 PROCEDURE — 99214 OFFICE O/P EST MOD 30 MIN: CPT | Performed by: INTERNAL MEDICINE

## 2020-12-29 PROCEDURE — G8417 CALC BMI ABV UP PARAM F/U: HCPCS | Performed by: INTERNAL MEDICINE

## 2020-12-29 PROCEDURE — 1090F PRES/ABSN URINE INCON ASSESS: CPT | Performed by: INTERNAL MEDICINE

## 2020-12-29 PROCEDURE — G8754 DIAS BP LESS 90: HCPCS | Performed by: INTERNAL MEDICINE

## 2020-12-29 PROCEDURE — G0463 HOSPITAL OUTPT CLINIC VISIT: HCPCS | Performed by: INTERNAL MEDICINE

## 2020-12-29 PROCEDURE — 93005 ELECTROCARDIOGRAM TRACING: CPT | Performed by: INTERNAL MEDICINE

## 2020-12-29 PROCEDURE — G8399 PT W/DXA RESULTS DOCUMENT: HCPCS | Performed by: INTERNAL MEDICINE

## 2020-12-29 PROCEDURE — G8427 DOCREV CUR MEDS BY ELIG CLIN: HCPCS | Performed by: INTERNAL MEDICINE

## 2020-12-29 PROCEDURE — 1101F PT FALLS ASSESS-DOCD LE1/YR: CPT | Performed by: INTERNAL MEDICINE

## 2020-12-29 RX ORDER — LORAZEPAM 1 MG/1
TABLET ORAL
COMMUNITY
Start: 2020-06-18 | End: 2021-02-17 | Stop reason: SDUPTHER

## 2020-12-29 RX ORDER — RANOLAZINE 500 MG/1
500 TABLET, EXTENDED RELEASE ORAL 2 TIMES DAILY
Qty: 180 TAB | Refills: 3 | Status: SHIPPED | OUTPATIENT
Start: 2020-12-29 | End: 2021-12-22 | Stop reason: SDUPTHER

## 2020-12-29 RX ORDER — ROSUVASTATIN CALCIUM 20 MG/1
20 TABLET, COATED ORAL
Qty: 90 TAB | Refills: 3 | Status: SHIPPED | OUTPATIENT
Start: 2020-12-29 | End: 2021-12-22 | Stop reason: SDUPTHER

## 2020-12-29 RX ORDER — METOPROLOL SUCCINATE 25 MG/1
25 TABLET, EXTENDED RELEASE ORAL
Qty: 90 TAB | Refills: 3 | Status: SHIPPED | OUTPATIENT
Start: 2020-12-29 | End: 2022-02-21 | Stop reason: SDUPTHER

## 2020-12-29 NOTE — LETTER
1/1/2021 Patient: Katherleen Baumgarten YOB: 1940 Date of Visit: 12/29/2020 Rey Bartlett MD 
[address] [place] [id number] [address] In [employer] Dear Rey Bartlett MD, Thank you for referring Ms. Ky Vicente to [address] [place] [address] for evaluation. My notes for this consultation are attached. If you have questions, please do not hesitate to call me. I look forward to following your patient along with you. Sincerely, Caio Hall MD

## 2020-12-29 NOTE — PROGRESS NOTES
Fawn Muro is a 79 y.o. female  Chief Complaint   Patient presents with    Follow-up     1. Have you been to the ER, urgent care clinic since your last visit? Hospitalized since your last visit?no    2. Have you seen or consulted any other health care providers outside of the [address] [place] [address] since your last visit? Include any pap smears or colon screening.  No  Visit Vitals  /80 (BP 1 Location: Left arm, BP Patient Position: At rest)   Pulse 88   Temp 97.8 °F (36.6 °C) (Skin)   Resp 18   Ht 5' 1\" (1.549 m)   Wt 180 lb (81.6 kg)   SpO2 98%   BMI 34.01 kg/m²

## 2020-12-29 NOTE — PROGRESS NOTES
Subjective/HPI:     Keturah Clancy is a 79 y.o. female is here for f/u appt. The patient denies chest pain/ shortness of breath, orthopnea, PND, LE edema, syncope, presyncope or fatigue. Doing well. PCP Provider  Lucian Reyes MD  Past Medical History:   Diagnosis Date    AV block, 3rd degree ([facility] [address] 5/20/2020    GERD (gastroesophageal reflux disease) 2/23/2010    Hypertriglyceridemia 2/23/2010    Hypovitaminosis D     NSVT (nonsustained ventricular tachycardia) ([facility] [address] 5/20/2020    OA (osteoarthritis) 2/23/2010    Osteoarthritis     S/P cardiac cath 5/20/2020    S/P placement of cardiac pacemaker 5/20/2020 5/20/2020 Medtronic dual chamber pacemaker implant     Vitamin D deficiency 2/23/2010      Past Surgical History:   Procedure Laterality Date    HX ACL RECONSTRUCTION  1998    HX CATARACT REMOVAL  06/2018    right eye    HX HEENT  12/08    cervical neck stenosis s/p cervical release    HX HERNIA REPAIR  1940    umbilical    HX HIP REPLACEMENT  6/2007    right    HX HIP REPLACEMENT  12/2007    left    HX ORTHOPAEDIC  12/2008    cervical release    LA COLONOSCOPY FLX DX W/COLLJ SPEC WHEN PFRMD  06/2006    LA COLONOSCOPY FLX DX W/COLLJ SPEC WHEN PFRMD  08/29/2011    Dr. Blayne Ward INS NEW/RPLCMT PRM PM W/TRANSV ELTRD ATRIAL&VENT N/A 5/20/2020    Insert Ppm Dual performed by Homa Wren MD at [facility] CARDIAC CATH LAB     Allergies   Allergen Reactions    Macrobid [Nitrofurantoin Monohyd/M-Cryst] Rash    Pcn [Penicillins] Hives      Family History   Problem Relation Age of Onset    Hypertension Mother     Heart Failure Mother     Cancer Father         colon    Lung Disease Brother     Arthritis-rheumatoid Brother     Heart Disease Brother       Current Outpatient Medications   Medication Sig    aspirin delayed-release 81 mg tablet Take 1 Tab by mouth daily.  metoprolol succinate (TOPROL-XL) 25 mg XL tablet Take 1 Tab by mouth nightly.     rosuvastatin (CRESTOR) 20 mg tablet Take 1 Tab by mouth nightly.  ranolazine ER (Ranexa) 500 mg SR tablet Take 1 Tab by mouth two (2) times a day.  LORazepam (ATIVAN) 1 mg tablet Take 0.5-1 Tabs by mouth every eight (8) hours as needed for Anxiety.  pantoprazole (PROTONIX) 40 mg tablet Take 1 Tab by mouth daily.  cetirizine (ZYRTEC) 10 mg tablet Take 10 mg by mouth daily.  Cholecalciferol, Vitamin D3, (VITAMIN D3) 1,000 unit Cap Take 1 Cap by mouth daily.  LORazepam (ATIVAN) 1 mg tablet      No current facility-administered medications for this visit. Vitals:    20 1108   BP: 132/80   Pulse: 88   Resp: 18   Temp: 97.8 °F (36.6 °C)   TempSrc: Skin   SpO2: 98%   Weight: 180 lb (81.6 kg)   Height: 5' 1\" (1.549 m)     Social History     Socioeconomic History    Marital status:      Spouse name: Not on file    Number of children: Not on file    Years of education: Not on file    Highest education level: Not on file   Occupational History    Not on file   Social Needs    Financial resource strain: Not on file    Food insecurity     Worry: Not on file     Inability: Not on file    Transportation needs     Medical: Not on file     Non-medical: Not on file   Tobacco Use    Smoking status: Former Smoker     Packs/day: 0.25     Years: 55.00     Pack years: 13.75     Quit date: 2019     Years since quittin.1    Smokeless tobacco: Never Used   Substance and Sexual Activity    Alcohol use:  Yes     Alcohol/week: 0.0 standard drinks     Comment: 1 drink a month    Drug use: No    Sexual activity: Not Currently   Lifestyle    Physical activity     Days per week: Not on file     Minutes per session: Not on file    Stress: Not on file   Relationships    Social connections     Talks on phone: Not on file     Gets together: Not on file     Attends [employer] service: Not on file     Active member of club or organization: Not on file     Attends meetings of clubs or organizations: Not on file     Relationship status: Not on file    Intimate partner violence     Fear of current or ex partner: Not on file     Emotionally abused: Not on file     Physically abused: Not on file     Forced sexual activity: Not on file   Other Topics Concern    Not on file   Social History Narrative    Not on file       I have reviewed the nurses notes, vitals, problem list, allergy list, medical history, family, social history and medications. Review of Symptoms:    General: Pt denies excessive weight gain or loss. Pt is able to conduct ADL's  HEENT: Denies blurred vision, headaches, epistaxis and difficulty swallowing. Respiratory: Denies shortness of breath, MALCOLM, wheezing or stridor. Cardiovascular: Denies precordial pain, palpitations, edema or PND  Gastrointestinal: Denies poor appetite, indigestion, abdominal pain or blood in stool  Urinary: Denies dysuria, pyuria  Musculoskeletal: Denies pain or swelling from muscles or joints  Neurologic: Denies tremor, paresthesias, or sensory motor disturbance  Skin: Denies rash, itching or texture change. Psych: Denies depression        Physical Exam:      General: Well developed, in no acute distress, cooperative and alert  HEENT: No carotid bruits, no JVD, trach is midline. Neck Supple, PEERL, EOM intact. Heart:  Normal S1/S2 negative S3 or S4. Regular, no murmur, gallop or rub. left pacemaker site intact  Respiratory: Clear bilaterally x 4, no wheezing or rales  Abdomen:   Soft, non-tender, no masses, bowel sounds are active. Extremities:  No edema, normal cap refill, no cyanosis, atraumatic. Neuro: A&Ox3, speech clear, gait stable. Skin: Skin color is normal. No rashes or lesions. Non diaphoretic  Vascular: 2+ pulses symmetric in all extremities    Cardiographics    ECG: Sinus  Rhythm, RBBB  -Left bundle branch block and left axis.      Results for orders placed or performed during the hospital encounter of 05/19/20   EKG, 12 LEAD, INITIAL   Result Value Ref Range    Ventricular Rate 132 BPM    Atrial Rate 133 BPM    QRS Duration 144 ms    Q-T Interval 372 ms    QTC Calculation (Bezet) 551 ms    Calculated R Axis -65 degrees    Calculated T Axis 86 degrees    Diagnosis       Wide QRS tachycardia  Left axis deviation  Left bundle branch block    Confirmed by Sangita Dee ([id number]) on 5/20/2020 9:13:03 AM           Cardiology Labs:  Lab Results   Component Value Date/Time    Cholesterol, total 171 07/22/2020 10:57 AM    HDL Cholesterol 49 07/22/2020 10:57 AM    LDL, calculated 70 07/22/2020 10:57 AM    LDL, calculated 138 (H) 10/02/2019 11:28 AM    LDL, calculated 118 (H) 04/02/2019 08:43 AM    VLDL, calculated 52 (H) 07/22/2020 10:57 AM    CHOL/HDL Ratio 3.3 09/22/2010 10:02 AM     Lab Results   Component Value Date/Time    Sodium 140 07/22/2020 10:57 AM    Potassium 4.8 07/22/2020 10:57 AM    Chloride 100 07/22/2020 10:57 AM    CO2 23 07/22/2020 10:57 AM    Glucose 107 (H) 07/22/2020 10:57 AM    BUN 14 07/22/2020 10:57 AM    Creatinine 1.01 (H) 07/22/2020 10:57 AM    BUN/Creatinine ratio 14 07/22/2020 10:57 AM    GFR est AA 62 07/22/2020 10:57 AM    GFR est non-AA 53 (L) 07/22/2020 10:57 AM    Calcium 10.1 07/22/2020 10:57 AM    Anion gap 5 05/21/2020 05:11 AM    Bilirubin, total 0.5 07/22/2020 10:57 AM    ALT (SGPT) 18 07/22/2020 10:57 AM    Alk. phosphatase 114 07/22/2020 10:57 AM    Protein, total 6.6 07/22/2020 10:57 AM    Albumin 4.5 07/22/2020 10:57 AM    Globulin 3.0 05/19/2020 08:04 PM    A-G Ratio 2.1 07/22/2020 10:57 AM     Lab Results   Component Value Date/Time    Hemoglobin A1c 6.2 (H) 03/27/2013 08:49 AM    Hemoglobin A1c (POC) 5.8 10/02/2019 11:28 AM    Hemoglobin A1c, External 6.2 06/26/2015        Assessment:     Assessment:     Diagnoses and all orders for this visit:    1.  Coronary artery disease involving native coronary artery of native heart without angina pectoris  -     AMB POC EKG ROUTINE W/ 12 LEADS, INTER & REP    2. Essential hypertension    3. Mixed hyperlipidemia    4. NSVT (nonsustained ventricular tachycardia) ([facility] [address]    5. Sick sinus syndrome (HCC)    6. Cardiac pacemaker in situ        ICD-10-CM ICD-9-CM    1. Coronary artery disease involving native coronary artery of native heart without angina pectoris  I25.10 414.01 AMB POC EKG ROUTINE W/ 12 LEADS, INTER & REP   2. Essential hypertension  I10 401.9    3. Mixed hyperlipidemia  E78.2 272.2    4. NSVT (nonsustained ventricular tachycardia) (HCC)  I47.2 427.1    5. Sick sinus syndrome (HCC)  I49.5 427.81    6. Cardiac pacemaker in situ  Z95.0 V45.01           Plan:     1. Coronary artery disease involving native coronary artery of native heart without angina pectoris  Denies angina or anginal equivalent symptoms. Cardiac cath 5/20 required no intervention. Cont BB, statin, ASA    2. NSVT (nonsustained ventricular tachycardia) (HCC)  No obstructive CAD seen on cath. EKG SR. Cont BB    3. HTN  Normotensive. Continue current anti hypertensive regimen. 4. AV block, 3rd degree (HCC)  Post pacemaker    5. Sick sinus syndrome ([facility] [address]  Pacemaker in place. 12/2020 device check with appropriate function. Managed by Dr Tamara Haskins    6. High cholesterol  On Crestor. 7/2020 LDL 70, HDL 49, trig 261. Previously on Lopid. Discussed diet low in fat/carbs, exercise routinely. Cont statin  Repeat labs in 6 months. F/U in 6 months. Bill Xie NP       [facility]    1/1/2021         Patient seen, examined by me personally. Plan discussed as detailed. Agree with note as outlined by  NP. I confirm findings in history and physical exam. No additional findings noted. Agree with plan as outlined above.      Miriam Reynolds MD

## 2021-02-17 ENCOUNTER — HOSPITAL ENCOUNTER (OUTPATIENT)
Dept: LAB | Age: 81
Discharge: HOME | End: 2021-02-17
Payer: MEDICARE

## 2021-02-17 ENCOUNTER — OFFICE VISIT (OUTPATIENT)
Dept: FAMILY MEDICINE CLINIC | Age: 81
End: 2021-02-17
Payer: MEDICARE

## 2021-02-17 VITALS
DIASTOLIC BLOOD PRESSURE: 78 MMHG | TEMPERATURE: 96.9 F | SYSTOLIC BLOOD PRESSURE: 138 MMHG | HEART RATE: 77 BPM | HEIGHT: 61 IN | WEIGHT: 182.4 LBS | RESPIRATION RATE: 18 BRPM | OXYGEN SATURATION: 97 % | BODY MASS INDEX: 34.44 KG/M2

## 2021-02-17 DIAGNOSIS — E66.9 NON MORBID OBESITY: ICD-10-CM

## 2021-02-17 DIAGNOSIS — Z11.59 NEED FOR HEPATITIS C SCREENING TEST: ICD-10-CM

## 2021-02-17 DIAGNOSIS — I25.10 CORONARY ARTERY DISEASE INVOLVING NATIVE CORONARY ARTERY OF NATIVE HEART WITHOUT ANGINA PECTORIS: ICD-10-CM

## 2021-02-17 DIAGNOSIS — Z51.81 ENCOUNTER FOR MEDICATION MONITORING: ICD-10-CM

## 2021-02-17 DIAGNOSIS — M15.9 PRIMARY OSTEOARTHRITIS INVOLVING MULTIPLE JOINTS: ICD-10-CM

## 2021-02-17 DIAGNOSIS — K21.9 GASTROESOPHAGEAL REFLUX DISEASE WITHOUT ESOPHAGITIS: ICD-10-CM

## 2021-02-17 DIAGNOSIS — R73.02 IGT (IMPAIRED GLUCOSE TOLERANCE): ICD-10-CM

## 2021-02-17 DIAGNOSIS — I49.5 SSS (SICK SINUS SYNDROME) (HCC): ICD-10-CM

## 2021-02-17 DIAGNOSIS — F41.9 ANXIETY: ICD-10-CM

## 2021-02-17 DIAGNOSIS — Z95.0 CARDIAC PACEMAKER IN SITU: ICD-10-CM

## 2021-02-17 DIAGNOSIS — E78.2 MIXED HYPERLIPIDEMIA: Primary | ICD-10-CM

## 2021-02-17 DIAGNOSIS — E55.9 HYPOVITAMINOSIS D: ICD-10-CM

## 2021-02-17 LAB — HBA1C MFR BLD HPLC: 5.9 %

## 2021-02-17 PROCEDURE — 80053 COMPREHEN METABOLIC PANEL: CPT

## 2021-02-17 PROCEDURE — 83036 HEMOGLOBIN GLYCOSYLATED A1C: CPT | Performed by: FAMILY MEDICINE

## 2021-02-17 PROCEDURE — 99214 OFFICE O/P EST MOD 30 MIN: CPT | Performed by: FAMILY MEDICINE

## 2021-02-17 PROCEDURE — G8427 DOCREV CUR MEDS BY ELIG CLIN: HCPCS | Performed by: FAMILY MEDICINE

## 2021-02-17 PROCEDURE — G8417 CALC BMI ABV UP PARAM F/U: HCPCS | Performed by: FAMILY MEDICINE

## 2021-02-17 PROCEDURE — G8536 NO DOC ELDER MAL SCRN: HCPCS | Performed by: FAMILY MEDICINE

## 2021-02-17 PROCEDURE — 80061 LIPID PANEL: CPT

## 2021-02-17 PROCEDURE — 82306 VITAMIN D 25 HYDROXY: CPT

## 2021-02-17 PROCEDURE — G0463 HOSPITAL OUTPT CLINIC VISIT: HCPCS | Performed by: FAMILY MEDICINE

## 2021-02-17 PROCEDURE — 86803 HEPATITIS C AB TEST: CPT

## 2021-02-17 PROCEDURE — G8399 PT W/DXA RESULTS DOCUMENT: HCPCS | Performed by: FAMILY MEDICINE

## 2021-02-17 PROCEDURE — 1090F PRES/ABSN URINE INCON ASSESS: CPT | Performed by: FAMILY MEDICINE

## 2021-02-17 PROCEDURE — G8510 SCR DEP NEG, NO PLAN REQD: HCPCS | Performed by: FAMILY MEDICINE

## 2021-02-17 PROCEDURE — 85027 COMPLETE CBC AUTOMATED: CPT

## 2021-02-17 PROCEDURE — 1101F PT FALLS ASSESS-DOCD LE1/YR: CPT | Performed by: FAMILY MEDICINE

## 2021-02-17 RX ORDER — PANTOPRAZOLE SODIUM 40 MG/1
40 TABLET, DELAYED RELEASE ORAL DAILY
Qty: 90 TAB | Refills: 3 | Status: SHIPPED | OUTPATIENT
Start: 2021-02-17 | End: 2022-02-18 | Stop reason: SDUPTHER

## 2021-02-17 RX ORDER — LORAZEPAM 1 MG/1
.5-1 TABLET ORAL
Qty: 90 TAB | Refills: 1 | Status: SHIPPED | OUTPATIENT
Start: 2021-02-17 | End: 2021-08-17 | Stop reason: SDUPTHER

## 2021-02-17 NOTE — LETTER
CONTROLLED SUBSTANCE MEDICATION AGREEMENT Patient Name: Tacho Garcia Patient YOB: 1940 I understand, that controlled substance medications may be used to help better manage my symptoms and to improve my ability to function at home, work and in social settings. However, I also understand that these medications do have risks, which have been discussed with me, including possible development of physical or psychological dependence. I understand that successful treatment requires mutual trust and honesty between me and my provider. I understand and agree that following this Medication Agreement is necessary in continuing my provider-patient relationship and the success of my treatment plan. Explanation from my Provider: Benefits and Goals of Controlled Substance Medications: There are two potential goals for your treatment: (1) decreased pain and suffering (2) improved daily life functions. There are many possible treatments for your chronic condition(s). Alternatives such as physical therapy, yoga, massage, home daily exercise, meditation, relaxation techniques, injections, chiropractic manipulations, surgery, cognitive therapy, hypnosis and many medications that are not habit-forming may be used. Use of controlled substance medications may be helpful, but they are unlikely to resolve all symptoms or restore all function. Explanation from my Provider: Risks of Controlled Substance Medications: Opioid pain medications: These medications can lead to problems such as addiction/dependence, sedation, lightheadedness/dizziness, memory issues, falls, constipation, nausea, or vomiting. They may also impair the ability to drive or operate machinery. Additionally, these medications may lower testosterone levels, leading to loss of bone strength, stamina and sex drive. They may cause problems with breathing, sleep apnea and reduced coughing, which is especially dangerous for patients with lung disease. Overdose or dangerous interactions with alcohol and other medications may occur, leading to death. Hyperalgesia may develop, which means patients receiving opioids for the treatment of pain may become more sensitive to certain painful stimuli, and in some cases, experience pain from ordinarily non-painful stimuli. Women between the ages of 14-53 who could become pregnant should carefully weigh the risks and benefits of opioids with their physicians, as these medications increase the risk of pregnancy complications, including miscarriage,  delivery and stillbirth. It is also possible for babies to be born addicted to opioids. Opioid dependence withdrawal symptoms may include; feelings of uneasiness, increased pain, irritability, belly pain, diarrhea, sweats and goose-flesh. Testosterone replacement therapy:  Potential side effects include increased risk of stroke and heart attack, blood clots, increased blood pressure, increased cholesterol, enlarged prostate, sleep apnea, irritability/aggression and other mood disorders, and decreased fertility. Radha Ríos (1940)             Page 1 of 4    Initials:_______ Benzodiazepines and non-benzodiazepine sleep medications: These medications can lead to problems such as addiction/dependence, sedation, fatigue, lightheadedness, dizziness, incoordination, falls, depression, hallucinations, and impaired judgment, memory and concentration. The ability to drive and operate machinery may also be affected. Abnormal sleep-related behaviors have been reported, including sleepwalking, driving, making telephone calls, eating, or having sex while not fully awake. These medications can suppress breathing and worsen sleep apnea, particularly when combined with alcohol or other sedating medications, potentially leading to death. Dependence withdrawal symptoms may include tremors, anxiety, hallucinations and seizures. Stimulants:  Common adverse effects include addiction/dependence, increased blood pressure and heart rate, decreased appetite, nausea, involuntary weight loss, insomnia,  irritability, and headaches. These risks may increase when these medications are combined with other stimulants, such as caffeine pills or energy drinks, certain weight loss supplements and oral decongestants. Dependence withdrawal symptoms may include depressed mood, loss of interest, suicidal thoughts, anxiety, fatigue, appetite changes and agitation. I agree and understand that I and my prescriber have the following rights and responsibilities regarding my treatment plan:  
1. MY RIGHTS: 
To be informed of my treatment and medication plan. To be an active participant in my health and wellbeing. 2. MY RESPONSIBILITY AND UNDERSTANDING FOR USE OF MEDICATIONS ? I will take medications at the dose and frequency as directed. For my safety, I will not increase or change how I take my medications without the recommendation of my healthcare provider. ? I will actively participate in any program recommended by my provider which may improve function, including social, physical, psychological programs. ? I will not take my medications with alcohol or other drugs not prescribed to me. I understand that drinking alcohol with my medications increases the chances of side effects, including reduced breathing rate and could lead to personal injury when operating machinery. ? I understand that if I have a history of substance use disorders, including alcohol or other illicit drugs, that I may be at increased risk of addiction to my medications. ? I agree to notify my provider immediately if I should become pregnant so that my treatment plan can be adjusted. ? I agree and understand that I shall only receive controlled substance medications from the prescriber that signed this agreement unless there is written agreement among other prescribers of controlled substances outlining the responsibility of the medications being prescribed. ? I understand that the if the controlled medication is not helping to achieve goals, the dosage may be tapered and no longer prescribed. 3. MY RESPONSIBILITY FOR COMMUNICATION / PRESCRIPTION RENEWALS 
? I agree that all controlled substance medications that I take will be prescribed only by my provider. If another healthcare provider prescribes me medication in an emergency, I will notify my provider within seventy-two (72) hours. Clay Cardoso (1940)             Page 2 of 4    Initials:_______ ? I will arrange for refills at the prescribed interval ONLY during regular office hours. I will not ask for refills earlier than agreed, after-hours, on holidays or weekends. Refills may take up to 72 hours for processing and prescriptions to reach the pharmacy. ? I will inform my other health care providers that I am taking these medications and of the existence of this [id number]. In the event of an emergency, I will provide the same information to the emergency department prescribers. ? I will keep my provider updated on the pharmacy I am using for controlled medication prescription filling. 4. MY RESPONSIBILITY FOR PROTECTING MEDICATIONS ? I will protect my prescriptions and medications. I understand that lost or misplaced prescriptions will not be replaced. ? I will keep medications only for my own use and will not share them with others. I will keep all medications away from children. ? I agree that if my medications are adjusted or discontinued, I will properly dispose of any remaining medications. I understand that I will be required to dispose of any remaining controlled medications as, directed by my prescriber, prior to being provided with any prescriptions for other controlled medications. Medication drop box locations can be found at: [email or web address] 5. MY RESPONSIBILITY WITH ILLEGAL DRUGS ? I will not use illegal or street drugs or another person's prescription medications not prescribed to me.  
? If there are identified addiction type symptoms, then referral to a program may be provided by my provider and I agree to follow through with this recommendation. 6. MY RESPONSIBILITY FOR COOPERATION WITH INVESTIGATIONS ? I understand that my provider will comply with any applicable law and may discuss my use and/or possible misuse/abuse of controlled substances and alcohol, as appropriate, with any health care provider involved in my care, pharmacist, or legal authority. ? I authorize my provider and pharmacy to cooperate fully with law enforcement agencies (as permitted by law) in the investigation of any possible misuse, sale, or other diversion of my controlled substances. ? I agree to waive any applicable privilege or right of privacy or confidentiality with respect to these authorizations. 7. PROVIDERS RIGHT TO MONITOR FOR SAFETY: PRESCRIPTION MONITORING / DRUG TESTING 
? I consent to drug/toxicology screening and will submit to a drug screen upon my providers request to assure I am only taking the prescribed drugs for my safety monitoring. I understand that a drug screen is a laboratory test in which a sample of my urine, blood or saliva is checked to see what drugs I have been taking. This may entail an observed urine specimen, which means that a nurse or other health care provider may watch me provide urine, and I will cooperate if I am asked to provide an observed specimen. Lucian Priest (1940)             Page 3 of 4    Initials:_______ ? I understand that my provider will check a copy of my State Prescription Monitoring Program () Report in order to safely prescribe medications. ? Pill Counts: I consent to pill counts when requested. I may be asked to bring all my prescribed controlled substance medications, in their original bottles, to all of my scheduled appointments. In addition, my provider may ask me to come to the practice at any time for a random pill count. 8. TERMINATION OF THIS AGREEMENT ? For my safety, my prescriber has the right to stop prescribing controlled substance medications and may end this agreement. ? Conditions that may result in termination of this agreement: 
a. I do not show any improvement in pain, or my activity has not improved. b. I develop rapid tolerance or loss of improvement, as described in my treatment plan. 
c. I develop significant side effects from the medication. d. My behavior is not consistent with the responsibilities outlined above, thereby causing safety concerns to continue prescribing controlled substance medications. e. I fail to follow the terms of this agreement. f. Other:____________________________ UNDERSTANDING THIS MEDICATION AGREEMENT:   
I have read the above and have had all my questions answered. For chronic disease management, I know that my symptoms can be managed with many types of treatments. A chronic medication trial may be part of my treatment, but I must be an active participant in my care. Medication therapy is only one part of my symptom management plan. In some cases, there may be limited scientific evidence to support the chronic use of certain medications to improve symptoms and daily function. Furthermore, in certain circumstances, there may be scientific information that suggests that the use of chronic controlled substances may worsen my symptoms and increase my risk of unintentional death directly related to this medication therapy. I know that if my provider feels my risk from controlled medications is greater than my benefit, I will have my controlled substance medication(s) compassionately lowered or removed altogether. I further agree to allow this office to contact my HIPAA contact if there are concerns about my safety and use of the controlled medications. I have agreed to use the prescribed controlled substance medications to me as instructed by my provider and as stated in this Medication Agreement. My initial on each page and my signature below shows that I have read each page and I have had the opportunity to ask questions with answers provided by my provider. Patient Name (Printed): _____________________________________ Patient Signature:  ______________________   Date: _____________ Prescriber Name (Printed): ___________________________________ Manan Signature: _____________________  Date: _____________ Lynder Sever (1940)             Page 4 of 4

## 2021-02-17 NOTE — PROGRESS NOTES
HISTORY OF PRESENT ILLNESS  Tara Joseph is a 79 y.o. female. HPI   Follow up on chronic medical problems. Had cardiac catheterization was 12/2019 and was decided to medically treat the proximal RCA and distal second DIAG lesion. Had episode of SSS/CHB in May 2020 and had PM placement. EF at last cath was at 55-60%. Similar CAD as prior cath 12/2019. Cardiovascular Review:  The patient has coronary artery disease and hyperlipidemia. She was started on ranexa and crestor. Diet and Lifestyle: generally follows a low fat low cholesterol diet, generally follows a low sodium diet, exercises sporadically, we discussed increasing her exercise regimen. Home BP Monitoring: is not measured at home. Pertinent ROS: taking medications as instructed, no medication side effects noted, no TIA's, no chest pain on exertion, no dyspnea on exertion, no swelling of ankles, no palpitations. Hypertriglyceridemia follow up:  Compliant w/ meds, low fat, low cholesterol diet. She is on crestor. Tolerating well. Exercising some. No muscle nor abdominal pain, no skin discoloration. Osteoarthritis:  Patient has osteoarthritis. Has various joint aches but overall doing well. Symptoms onset: problem is longstanding. Rheumatological ROS: stable, mild-to-moderate joint symptoms intermittently, reasonably well controlled by PRN meds. Response to treatment plan: stable and intermittent. Glucose intolerance reveiw:  She has IGT. Diabetic ROS - further diabetic ROS: no polyuria or polydipsia, no chest pain, dyspnea or TIA's, no numbness, tingling or pain in extremities, no unusual visual symptoms. Lab review: orders written for new lab studies as appropriate; see orders.       Patient Active Problem List   Diagnosis Code    Hypertriglyceridemia E78.1    GERD (gastroesophageal reflux disease) K21.9    OA (osteoarthritis) M19.90    Vitamin D deficiency E55.9    Anxiety F41.9    Environmental allergies Z91.09    Encounter for medication monitoring Z51.81    IGT (impaired glucose tolerance) R73.02    [facility]ick sinus syndrome ([facility]) I49.5    Syncope R55    NSVT (nonsustained ventricular tachycardia) ([facility]) I47.2    AV block, 3rd degree ([facility]) I44.2    S/P placement of cardiac pacemaker Z95.0    S/P cardiac cath Z98.890       Current Outpatient Medications   Medication Sig Dispense Refill    LORazepam (ATIVAN) 1 mg tablet       metoprolol succinate (TOPROL-XL) 25 mg XL tablet Take 1 Tab by mouth nightly. 90 Tab 3    ranolazine ER (Ranexa) 500 mg SR tablet Take 1 Tab by mouth two (2) times a day. 180 Tab 3    rosuvastatin (CRESTOR) 20 mg tablet Take 1 Tab by mouth nightly. 90 Tab 3    aspirin delayed-release 81 mg tablet Take 1 Tab by mouth daily. 30 Tab 5    LORazepam (ATIVAN) 1 mg tablet Take 0.5-1 Tabs by mouth every eight (8) hours as needed for Anxiety. 90 Tab 1    pantoprazole (PROTONIX) 40 mg tablet Take 1 Tab by mouth daily. 90 Tab 3    cetirizine (ZYRTEC) 10 mg tablet Take 10 mg by mouth daily.  Cholecalciferol, Vitamin D3, (VITAMIN D3) 1,000 unit Cap Take 1 Cap by mouth daily.            Allergies   Allergen Reactions    Macrobid [Nitrofurantoin Monohyd/M-Cryst] Rash    Pcn [Penicillins] Hives         Past Medical History:   Diagnosis Date    AV block, 3rd degree ([facility] [address] 5/20/2020    GERD (gastroesophageal reflux disease) 2/23/2010    Hypertriglyceridemia 2/23/2010    Hypovitaminosis D     NSVT (nonsustained ventricular tachycardia) ([facility] [address] 5/20/2020    OA (osteoarthritis) 2/23/2010    Osteoarthritis     S/P cardiac cath 5/20/2020    S/P placement of cardiac pacemaker 5/20/2020 5/20/2020 Medtronic dual chamber pacemaker implant     Vitamin D deficiency 2/23/2010         Past Surgical History:   Procedure Laterality Date    HX ACL RECONSTRUCTION  1998    HX CATARACT REMOVAL  06/2018    right eye    HX HEENT  12/08    cervical neck stenosis s/p cervical release    HX HERNIA REPAIR 8109    umbilical    HX HIP REPLACEMENT  2007    right    HX HIP REPLACEMENT  2007    left    HX ORTHOPAEDIC  2008    cervical release    [place] COLONOSCOPY FLX DX W/COLLJ SPEC WHEN PFRMD  2006    [place] COLONOSCOPY FLX DX W/COLLJ SPEC WHEN PFRMD  2011    Dr. Hernandez President INS NEW/RPLCMT PRM PM W/TRANSV ELTRD ATRIAL&VENT N/A 2020    Insert Ppm Dual performed by Cesar Rousseau MD at [facility] CARDIAC C[facility] LAB         Family History   Problem Relation Age of Onset    Hypertension Mother     Heart Failure Mother     Cancer Father         colon    Lung Disease Brother     Arthritis-rheumatoid Brother     Heart Disease Brother        Social History     Tobacco Use    Smoking status: Former Smoker     Packs/day: 0.25     Years: 55.00     Pack years: 13.75     Quit date: 2019     Years since quittin.2    Smokeless tobacco: Never Used   Substance Use Topics    Alcohol use:  Yes     Alcohol/week: 0.0 standard drinks     Comment: 1 drink a month     Lab Results   Component Value Date/Time    WBC 9.3 2020 08:04 PM    HGB 12.7 2020 08:04 PM    HCT 39.2 2020 08:04 PM    PLATELET 043  08:04 PM    MCV 88.3 2020 08:04 PM     Lab Results   Component Value Date/Time    Cholesterol, total 171 2020 10:57 AM    HDL Cholesterol 49 2020 10:57 AM    LDL, calculated 70 2020 10:57 AM    LDL-C, External 134 2015 07:42 AM    Triglyceride 261 (H) 2020 10:57 AM    CHOL/HDL Ratio 3.3 2010 10:02 AM     Lab Results   Component Value Date/Time    TSH 0.196 (L) 10/02/2019 11:28 AM    T3 Uptake 20 (L) 10/02/2019 11:28 AM    T4, Free 1.15 10/02/2019 11:28 AM    T4, Total 6.3 10/02/2019 11:28 AM      Lab Results   Component Value Date/Time    Sodium 140 2020 10:57 AM    Potassium 4.8 2020 10:57 AM    Chloride 100 2020 10:57 AM    CO2 23 2020 10:57 AM    Anion gap 5 2020 05:11 AM    Glucose 107 (H) 2020 10:57 AM BUN 14 07/22/2020 10:57 AM    Creatinine 1.01 (H) 07/22/2020 10:57 AM    BUN/Creatinine ratio 14 07/22/2020 10:57 AM    GFR est AA 62 07/22/2020 10:57 AM    GFR est non-AA 53 (L) 07/22/2020 10:57 AM    Calcium 10.1 07/22/2020 10:57 AM    Bilirubin, total 0.5 07/22/2020 10:57 AM    ALT (SGPT) 18 07/22/2020 10:57 AM    Alk. phosphatase 114 07/22/2020 10:57 AM    Protein, total 6.6 07/22/2020 10:57 AM    Albumin 4.5 07/22/2020 10:57 AM    Globulin 3.0 05/19/2020 08:04 PM    A-G Ratio 2.1 07/22/2020 10:57 AM      Lab Results   Component Value Date/Time    Hemoglobin A1c 6.2 (H) 03/27/2013 08:49 AM    Hemoglobin A1c (POC) 5.8 10/02/2019 11:28 AM    Hemoglobin A1c, External 6.2 06/26/2015              Review of Systems   Constitutional: Negative. HENT: Negative for congestion. Eyes: Negative for blurred vision and double vision. Respiratory: Negative for cough and shortness of breath. Cardiovascular: Negative for chest pain and leg swelling. Gastrointestinal: Negative. Negative for abdominal pain, constipation and heartburn. Genitourinary: Negative. Musculoskeletal: Negative for back pain and joint pain. Skin: Negative. Neurological: Negative for dizziness, tingling and headaches. Endo/Heme/Allergies: Negative for environmental allergies. Psychiatric/Behavioral: Negative for depression. The patient does not have insomnia. Physical Exam  Vitals signs and nursing note reviewed. Constitutional:       Appearance: Normal appearance. She is well-developed. Comments: /78 (BP 1 Location: Left arm, BP Patient Position: Sitting)   Pulse 77   Temp 96.9 °F (36.1 °C) (Temporal)   Resp 18   Ht 5' 1\" (1.549 m)   Wt 182 lb 6.4 oz (82.7 kg)   SpO2 97%   BMI 34.46 kg/m²    HENT:      Right Ear: Tympanic membrane and ear canal normal.      Left Ear: Tympanic membrane and ear canal normal.      Nose: No mucosal edema or rhinorrhea.    Neck:      Musculoskeletal: Normal range of motion and neck supple.      Thyroid: No thyromegaly.   Cardiovascular:      Rate and Rhythm: Normal rate and regular rhythm.      Heart sounds: Normal heart sounds. No gallop.    Pulmonary:      Effort: Pulmonary effort is normal.      Breath sounds: Normal breath sounds.   Abdominal:      General: Bowel sounds are normal.      Palpations: Abdomen is soft. There is no mass.      Tenderness: There is no abdominal tenderness.   Musculoskeletal: Normal range of motion.         General: No swelling.      Right lower leg: No edema.      Left lower leg: No edema.   Lymphadenopathy:      Cervical: No cervical adenopathy.   Skin:     General: Skin is warm and dry.   Neurological:      General: No focal deficit present.      Mental Status: She is alert and oriented to person, place, and time.   Psychiatric:         Mood and Affect: Mood normal.         ASSESSMENT and PLAN  Diagnoses and all orders for this visit:    1. Mixed hyperlipidemia  -     LIPID PANEL    2. Coronary artery disease involving native coronary artery of native heart without angina pectoris//  3. SSS (sick sinus syndrome) (HCC)  4. Cardiac pacemaker in situ  Stable as per cardiology     5. IGT (impaired glucose tolerance)  -     AMB POC HEMOGLOBIN A1C    6. Primary osteoarthritis involving multiple joints  Overall stable     7. Gastroesophageal reflux disease without esophagitis  -    Refill pantoprazole (PROTONIX) 40 mg tablet; Take 1 Tab by mouth daily.    8. Anxiety  -     Refill LORazepam (ATIVAN) 1 mg tablet; Take 0.5-1 Tabs by mouth every eight (8) hours as needed for Anxiety.    9. Hypovitaminosis D  -     VITAMIN D, 25 HYDROXY    10. Encounter for medication monitoring  -     METABOLIC PANEL, COMPREHENSIVE  -     CBC W/O DIFF    11. Non morbid obesity  I have reviewed/discussed the above normal BMI with the patient.  I have recommended the following interventions: dietary management education, guidance, and counseling .     12. Need for hepatitis C screening  test  -     HEPATITIS C AB      Follow-up and Dispositions    · Return in about 6 months (around 8/17/2021) for medicare wellness exam.       reviewed diet, exercise and weight control  cardiovascular risk and specific lipid/LDL goals reviewed  reviewed medications and side effects in detail  specific diabetic recommendations: low cholesterol diet, weight control and daily exercise discussed and glycohemoglobin and other lab monitoring discussed

## 2021-02-17 NOTE — PROGRESS NOTES
Chief Complaint   Patient presents with    Cholesterol Problem     follow up       Mammogram 10/1/2018    Bone density 10/10/2016    Colonoscopy 12/14/2016 by Dr. Luz Ennis- repeat in 5 years    Eye exam 8/19/2019 by Dr. Anna Cruz. Patient states she had an eye exam 10/2020. Patient signed medical release. 1. Have you been to the ER, urgent care clinic since your last visit? Hospitalized since your last visit?no    2. Have you seen or consulted any other health care providers outside of the [address] [place] [address] since your last visit? Include any pap smears or colon screening.  No

## 2021-02-18 LAB
25(OH)D3+25(OH)D2 SERPL-MCNC: 33.4 NG/ML (ref 30–100)
ALBUMIN SERPL-MCNC: 4.5 G/DL (ref 3.7–4.7)
ALBUMIN/GLOB SERPL: 2 {RATIO} (ref 1.2–2.2)
ALP SERPL-CCNC: 115 IU/L (ref 39–117)
ALT SERPL-CCNC: 16 IU/L (ref 0–32)
AST SERPL-CCNC: 13 IU/L (ref 0–40)
BILIRUB SERPL-MCNC: 0.4 MG/DL (ref 0–1.2)
BUN SERPL-MCNC: 15 MG/DL (ref 8–27)
BUN/CREAT SERPL: 16 (ref 12–28)
CALCIUM SERPL-MCNC: 10.3 MG/DL (ref 8.7–10.3)
CHLORIDE SERPL-SCNC: 104 MMOL/L (ref 96–106)
CHOLEST SERPL-MCNC: 159 MG/DL (ref 100–199)
CO2 SERPL-SCNC: 25 MMOL/L (ref 20–29)
CREAT SERPL-MCNC: 0.93 MG/DL (ref 0.57–1)
ERYTHROCYTE [DISTWIDTH] IN BLOOD BY AUTOMATED COUNT: 11.9 % (ref 11.7–15.4)
GLOBULIN SER CALC-MCNC: 2.2 G/DL (ref 1.5–4.5)
GLUCOSE SERPL-MCNC: 108 MG/DL (ref 65–99)
HCT VFR BLD AUTO: 43.1 % (ref 34–46.6)
HCV AB S/CO SERPL IA: <0.1 S/CO RATIO (ref 0–0.9)
HDLC SERPL-MCNC: 50 MG/DL
HGB BLD-MCNC: 14.1 G/DL (ref 11.1–15.9)
INTERPRETATION, 910389: NORMAL
INTERPRETATION: NORMAL
LDLC SERPL CALC-MCNC: 68 MG/DL (ref 0–99)
MCH RBC QN AUTO: 29.5 PG (ref 26.6–33)
MCHC RBC AUTO-ENTMCNC: 32.7 G/DL (ref 31.5–35.7)
MCV RBC AUTO: 90 FL (ref 79–97)
PDF IMAGE, 910387: NORMAL
PLATELET # BLD AUTO: 326 X10E3/UL (ref 150–450)
POTASSIUM SERPL-SCNC: 5.3 MMOL/L (ref 3.5–5.2)
PROT SERPL-MCNC: 6.7 G/DL (ref 6–8.5)
RBC # BLD AUTO: 4.78 X10E6/UL (ref 3.77–5.28)
SODIUM SERPL-SCNC: 143 MMOL/L (ref 134–144)
TRIGL SERPL-MCNC: 252 MG/DL (ref 0–149)
VLDLC SERPL CALC-MCNC: 41 MG/DL (ref 5–40)
WBC # BLD AUTO: 8.5 X10E3/UL (ref 3.4–10.8)

## 2021-03-22 ENCOUNTER — OFFICE VISIT (OUTPATIENT)
Dept: CARDIOLOGY CLINIC | Age: 81
End: 2021-03-22
Payer: MEDICARE

## 2021-03-22 DIAGNOSIS — Z95.0 CARDIAC PACEMAKER IN SITU: Primary | ICD-10-CM

## 2021-03-22 DIAGNOSIS — I49.5 SICK SINUS SYNDROME (HCC): ICD-10-CM

## 2021-03-22 PROCEDURE — 93294 REM INTERROG EVL PM/LDLS PM: CPT | Performed by: INTERNAL MEDICINE

## 2021-03-22 PROCEDURE — 93296 REM INTERROG EVL PM/IDS: CPT | Performed by: INTERNAL MEDICINE

## 2021-06-21 ENCOUNTER — OFFICE VISIT (OUTPATIENT)
Dept: CARDIOLOGY CLINIC | Age: 81
End: 2021-06-21
Payer: MEDICARE

## 2021-06-21 DIAGNOSIS — Z95.0 CARDIAC PACEMAKER IN SITU: Primary | ICD-10-CM

## 2021-06-21 DIAGNOSIS — I49.5 SICK SINUS SYNDROME (HCC): ICD-10-CM

## 2021-06-21 PROCEDURE — 93294 REM INTERROG EVL PM/LDLS PM: CPT | Performed by: INTERNAL MEDICINE

## 2021-06-21 PROCEDURE — 93296 REM INTERROG EVL PM/IDS: CPT | Performed by: INTERNAL MEDICINE

## 2021-06-29 DIAGNOSIS — I47.29 NSVT (NONSUSTAINED VENTRICULAR TACHYCARDIA): ICD-10-CM

## 2021-06-29 DIAGNOSIS — I49.5 SICK SINUS SYNDROME (HCC): ICD-10-CM

## 2021-06-29 DIAGNOSIS — I25.10 CORONARY ARTERY DISEASE INVOLVING NATIVE CORONARY ARTERY OF NATIVE HEART WITHOUT ANGINA PECTORIS: ICD-10-CM

## 2021-06-29 DIAGNOSIS — E78.2 MIXED HYPERLIPIDEMIA: ICD-10-CM

## 2021-06-29 DIAGNOSIS — I10 ESSENTIAL HYPERTENSION: ICD-10-CM

## 2021-06-29 DIAGNOSIS — Z95.0 CARDIAC PACEMAKER IN SITU: ICD-10-CM

## 2021-07-27 ENCOUNTER — TELEPHONE (OUTPATIENT)
Dept: CARDIOLOGY CLINIC | Age: 81
End: 2021-07-27

## 2021-07-27 LAB
ALBUMIN SERPL-MCNC: 4.6 G/DL (ref 3.7–4.7)
ALBUMIN/GLOB SERPL: 2.2 {RATIO} (ref 1.2–2.2)
ALP SERPL-CCNC: 104 IU/L (ref 48–121)
ALT SERPL-CCNC: 14 IU/L (ref 0–32)
AST SERPL-CCNC: 22 IU/L (ref 0–40)
BILIRUB SERPL-MCNC: 0.4 MG/DL (ref 0–1.2)
BUN SERPL-MCNC: 15 MG/DL (ref 8–27)
BUN/CREAT SERPL: 14 (ref 12–28)
CALCIUM SERPL-MCNC: 9.9 MG/DL (ref 8.7–10.3)
CHLORIDE SERPL-SCNC: 104 MMOL/L (ref 96–106)
CHOLEST SERPL-MCNC: 157 MG/DL (ref 100–199)
CK SERPL-CCNC: 71 U/L (ref 32–182)
CO2 SERPL-SCNC: 26 MMOL/L (ref 20–29)
CREAT SERPL-MCNC: 1.08 MG/DL (ref 0.57–1)
GLOBULIN SER CALC-MCNC: 2.1 G/DL (ref 1.5–4.5)
GLUCOSE SERPL-MCNC: 124 MG/DL (ref 65–99)
HDLC SERPL-MCNC: 47 MG/DL
IMP & REVIEW OF LAB RESULTS: NORMAL
INTERPRETATION: NORMAL
LDLC SERPL CALC-MCNC: 68 MG/DL (ref 0–99)
POTASSIUM SERPL-SCNC: 4.5 MMOL/L (ref 3.5–5.2)
PROT SERPL-MCNC: 6.7 G/DL (ref 6–8.5)
SODIUM SERPL-SCNC: 143 MMOL/L (ref 134–144)
TRIGL SERPL-MCNC: 262 MG/DL (ref 0–149)
VLDLC SERPL CALC-MCNC: 42 MG/DL (ref 5–40)

## 2021-07-27 NOTE — TELEPHONE ENCOUNTER
Spoke with patient. Verified with two patient identifiers. Advised pt per Gina Perez NP, Sugar high is fasting at 124. Kidney function slightly strained. Be sure to drink plenty of water this time of year. Liver and other electrolytes normal. Cholesterol remains with good LDL 68. Trig are higher at 262. If she has not been taking fish oil supplement lets start there. Obtain one that is EPA/DHA combo. Take as mentioned on label. Typically 1000mg bid. Patient verbalized understanding.

## 2021-07-27 NOTE — TELEPHONE ENCOUNTER
----- Message from Cecille Jones NP sent at 7/27/2021  8:51 AM EDT -----  Sugar high is fasting at 124. Kidney function slightly strained. Be sure to drink plenty of water this time of year. Liver and other electrolytes normal. Cholesterol remains with good LDL 68. Trig are higher at 262. If she has not been taking fish oil supplement lets start there. Obtain one that is EPA/DHA combo. Take as mentioned on label. Typically 1000mg bid.

## 2021-07-27 NOTE — PROGRESS NOTES
Sugar high is fasting at 124. Kidney function slightly strained. Be sure to drink plenty of water this time of year. Liver and other electrolytes normal. Cholesterol remains with good LDL 68. Trig are higher at 262. If she has not been taking fish oil supplement lets start there. Obtain one that is EPA/DHA combo. Take as mentioned on label. Typically 1000mg bid.

## 2021-08-04 PROBLEM — I49.5 SICK SINUS SYNDROME (HCC): Status: RESOLVED | Noted: 2020-05-19 | Resolved: 2021-08-04

## 2021-08-04 PROBLEM — I25.10 CORONARY ARTERY DISEASE INVOLVING NATIVE CORONARY ARTERY OF NATIVE HEART WITHOUT ANGINA PECTORIS: Status: ACTIVE | Noted: 2021-08-04

## 2021-08-04 PROBLEM — I44.2 AV BLOCK, 3RD DEGREE (HCC): Status: RESOLVED | Noted: 2020-05-20 | Resolved: 2021-08-04

## 2021-08-04 PROBLEM — R55 SYNCOPE: Status: RESOLVED | Noted: 2020-05-19 | Resolved: 2021-08-04

## 2021-08-04 PROBLEM — E78.2 MIXED HYPERLIPIDEMIA: Status: ACTIVE | Noted: 2021-08-04

## 2021-08-04 NOTE — PROGRESS NOTES
Yasmin Soliman, [facility]    Subjective/HPI:     Chloe Mcginnis is a [de-identified] y.o. female is here for routine f/u. She has a PMHx of non-obstructive CAD, AV block s/p PPM, NSVT, and HLD. Doing well. Denies complaints of chest pains, dizziness, orthopnea, PND or edema. Denies palpitation symptoms. Has baseline shortness of breath with moderate exertion but this is unchanged for years. Pleased with recent cholesterol results. Current Outpatient Medications on File Prior to Visit   Medication Sig Dispense Refill    pantoprazole (PROTONIX) 40 mg tablet Take 1 Tab by mouth daily. 90 Tab 3    LORazepam (ATIVAN) 1 mg tablet Take 0.5-1 Tabs by mouth every eight (8) hours as needed for Anxiety. 90 Tab 1    metoprolol succinate (TOPROL-XL) 25 mg XL tablet Take 1 Tab by mouth nightly. 90 Tab 3    ranolazine ER (Ranexa) 500 mg SR tablet Take 1 Tab by mouth two (2) times a day. 180 Tab 3    rosuvastatin (CRESTOR) 20 mg tablet Take 1 Tab by mouth nightly. 90 Tab 3    aspirin delayed-release 81 mg tablet Take 1 Tab by mouth daily. 30 Tab 5    cetirizine (ZYRTEC) 10 mg tablet Take 10 mg by mouth daily.  Cholecalciferol, Vitamin D3, (VITAMIN D3) 1,000 unit Cap Take 1 Cap by mouth daily. No current facility-administered medications on file prior to visit. Review of Symptoms:    Review of Systems   Constitutional: Negative for chills, fever and weight loss. HENT: Negative for nosebleeds. Eyes: Negative for blurred vision and double vision. Respiratory: Positive for shortness of breath (unchanged). Negative for cough and wheezing. Cardiovascular: Negative for chest pain, palpitations, orthopnea, leg swelling and PND. Skin: Negative for rash. Neurological: Negative for dizziness and loss of consciousness.        Physical Exam:      General: Well developed, in no acute distress, cooperative and alert  Heart:  reg rate and rhythm; normal S1/S2; no murmurs, no gallops or rubs.   Respiratory: Clear bilaterally x 4, no wheezing or rales  Extremities:  Normal cap refill, no cyanosis, atraumatic. No edema. Vascular: 2+ pulses symmetric in all extremities    Vitals:    08/05/21 1326   BP: 138/80   BP 1 Location: Right arm   BP Patient Position: Sitting   BP Cuff Size: Large adult   Pulse: 86   Resp: 18   Height: 5' 1\" (1.549 m)   Weight: 183 lb 11.2 oz (83.3 kg)   SpO2: 96%       ECG done today shows V-paced rhythm     Assessment:       ICD-10-CM ICD-9-CM    1. Coronary artery disease involving native coronary artery of native heart without angina pectoris  I25.10 414.01 AMB POC EKG ROUTINE W/ 12 LEADS, INTER & REP   2. NSVT (nonsustained ventricular tachycardia) (HCC)  I47.2 427.1    3. Mixed hyperlipidemia  E78.2 272.2    4. S/P placement of cardiac pacemaker  Z95.0 V45.01         Plan:     1. Coronary artery disease involving native coronary artery of native heart without angina pectoris  Moderate non-obstructive disease of the LAD (20%) with 80% D1 lesion and 50% mid RCA stenosis, on cath in 5/2020  Without anginal or anginal equivalent symptoms  Continue BB, ASA, statin therapy    2. NSVT (nonsustained ventricular tachycardia) (HCC)  No recurrence; continue BB therapy    3. Mixed hyperlipidemia  LDL 68 in 7/2021  Continue statin therapy and low fat, low cholesterol diet  Lipids managed by PCP    4. S/P placement of cardiac pacemaker  Continue with routine device interrogation with Dr. Frank Townsend    F/u with Dr. Alvaro Horne in 6 months    Varsha Yates NP       [facility]             Patient seen, examined by me personally. Plan discussed as detailed. Agree with note as outlined by  NP with modifications as noted. My independent physical exam reveals : Physical Exam  Vitals and nursing note reviewed. Cardiovascular:      Rate and Rhythm: Normal rate and regular rhythm. Heart sounds: Normal heart sounds.    Pulmonary:      Effort: Pulmonary effort is normal. Breath sounds: Normal breath sounds. Musculoskeletal:      Cervical back: Neck supple. Right lower leg: No edema. Left lower leg: No edema. Skin:     General: Skin is dry. Neurological:      Mental Status: She is alert and oriented to person, place, and time. Psychiatric:         Mood and Affect: Mood normal.         Behavior: Behavior normal.          No additional findings noted. Agree with plan as outlined above with modifications as noted. Doing well. Asymptomatic. Encouraged physical activity.     Geoffrey Moscoso MD

## 2021-08-05 ENCOUNTER — OFFICE VISIT (OUTPATIENT)
Dept: CARDIOLOGY CLINIC | Age: 81
End: 2021-08-05
Payer: MEDICARE

## 2021-08-05 VITALS
SYSTOLIC BLOOD PRESSURE: 138 MMHG | WEIGHT: 183.7 LBS | HEART RATE: 86 BPM | RESPIRATION RATE: 18 BRPM | OXYGEN SATURATION: 96 % | HEIGHT: 61 IN | DIASTOLIC BLOOD PRESSURE: 80 MMHG | BODY MASS INDEX: 34.68 KG/M2

## 2021-08-05 DIAGNOSIS — Z95.0 S/P PLACEMENT OF CARDIAC PACEMAKER: ICD-10-CM

## 2021-08-05 DIAGNOSIS — I47.29 NSVT (NONSUSTAINED VENTRICULAR TACHYCARDIA): ICD-10-CM

## 2021-08-05 DIAGNOSIS — E78.2 MIXED HYPERLIPIDEMIA: ICD-10-CM

## 2021-08-05 DIAGNOSIS — I25.10 CORONARY ARTERY DISEASE INVOLVING NATIVE CORONARY ARTERY OF NATIVE HEART WITHOUT ANGINA PECTORIS: Primary | ICD-10-CM

## 2021-08-05 PROCEDURE — 93010 ELECTROCARDIOGRAM REPORT: CPT | Performed by: INTERNAL MEDICINE

## 2021-08-05 PROCEDURE — G8510 SCR DEP NEG, NO PLAN REQD: HCPCS | Performed by: INTERNAL MEDICINE

## 2021-08-05 PROCEDURE — G8754 DIAS BP LESS 90: HCPCS | Performed by: INTERNAL MEDICINE

## 2021-08-05 PROCEDURE — 93005 ELECTROCARDIOGRAM TRACING: CPT | Performed by: INTERNAL MEDICINE

## 2021-08-05 PROCEDURE — G0463 HOSPITAL OUTPT CLINIC VISIT: HCPCS | Performed by: INTERNAL MEDICINE

## 2021-08-05 PROCEDURE — G8417 CALC BMI ABV UP PARAM F/U: HCPCS | Performed by: INTERNAL MEDICINE

## 2021-08-05 PROCEDURE — G8536 NO DOC ELDER MAL SCRN: HCPCS | Performed by: INTERNAL MEDICINE

## 2021-08-05 PROCEDURE — G8427 DOCREV CUR MEDS BY ELIG CLIN: HCPCS | Performed by: INTERNAL MEDICINE

## 2021-08-05 PROCEDURE — 1090F PRES/ABSN URINE INCON ASSESS: CPT | Performed by: INTERNAL MEDICINE

## 2021-08-05 PROCEDURE — 99213 OFFICE O/P EST LOW 20 MIN: CPT | Performed by: INTERNAL MEDICINE

## 2021-08-05 PROCEDURE — G8752 SYS BP LESS 140: HCPCS | Performed by: INTERNAL MEDICINE

## 2021-08-05 PROCEDURE — 1101F PT FALLS ASSESS-DOCD LE1/YR: CPT | Performed by: INTERNAL MEDICINE

## 2021-08-05 PROCEDURE — G8399 PT W/DXA RESULTS DOCUMENT: HCPCS | Performed by: INTERNAL MEDICINE

## 2021-08-05 NOTE — LETTER
8/5/2021    Patient: Jose Katz   YOB: 1940   Date of Visit: 8/5/2021     Anna De Los Santos MD  [address] [place] [address]  Via In Basket    Dear Anna De Los Santos MD,      Thank you for referring Ms. Fredrick Rowley to [address] [place] [address] for evaluation. My notes for this consultation are attached. If you have questions, please do not hesitate to call me. I look forward to following your patient along with you.       Sincerely,    Abida Snider MD

## 2021-08-05 NOTE — PROGRESS NOTES
1. Have you been to the ER, urgent care clinic since your last visit? Hospitalized since your last visit? No    2. Have you seen or consulted any other health care providers outside of the [address] [place] [address] since your last visit? Include any pap smears or colon screening.     Dr. Pricilla Nixon, Dermatologist    Chief Complaint   Patient presents with    Coronary Artery Disease     6mo f/u; States SOB is the same as previous visit, denied other cardiac symptons

## 2021-08-17 ENCOUNTER — OFFICE VISIT (OUTPATIENT)
Dept: FAMILY MEDICINE CLINIC | Age: 81
End: 2021-08-17
Payer: MEDICARE

## 2021-08-17 VITALS
DIASTOLIC BLOOD PRESSURE: 69 MMHG | RESPIRATION RATE: 18 BRPM | HEART RATE: 74 BPM | OXYGEN SATURATION: 96 % | BODY MASS INDEX: 34.48 KG/M2 | HEIGHT: 61 IN | SYSTOLIC BLOOD PRESSURE: 131 MMHG | TEMPERATURE: 97.8 F | WEIGHT: 182.6 LBS

## 2021-08-17 DIAGNOSIS — Z95.0 CARDIAC PACEMAKER IN SITU: ICD-10-CM

## 2021-08-17 DIAGNOSIS — R73.02 IGT (IMPAIRED GLUCOSE TOLERANCE): ICD-10-CM

## 2021-08-17 DIAGNOSIS — I25.10 CORONARY ARTERY DISEASE INVOLVING NATIVE CORONARY ARTERY OF NATIVE HEART WITHOUT ANGINA PECTORIS: ICD-10-CM

## 2021-08-17 DIAGNOSIS — Z51.81 ENCOUNTER FOR MEDICATION MONITORING: ICD-10-CM

## 2021-08-17 DIAGNOSIS — F41.9 ANXIETY: ICD-10-CM

## 2021-08-17 DIAGNOSIS — M15.9 PRIMARY OSTEOARTHRITIS INVOLVING MULTIPLE JOINTS: ICD-10-CM

## 2021-08-17 DIAGNOSIS — K21.9 GASTROESOPHAGEAL REFLUX DISEASE WITHOUT ESOPHAGITIS: ICD-10-CM

## 2021-08-17 DIAGNOSIS — I49.5 SSS (SICK SINUS SYNDROME) (HCC): ICD-10-CM

## 2021-08-17 DIAGNOSIS — E78.2 MIXED HYPERLIPIDEMIA: ICD-10-CM

## 2021-08-17 DIAGNOSIS — Z00.00 MEDICARE ANNUAL WELLNESS VISIT, SUBSEQUENT: Primary | ICD-10-CM

## 2021-08-17 DIAGNOSIS — E55.9 HYPOVITAMINOSIS D: ICD-10-CM

## 2021-08-17 PROCEDURE — G8754 DIAS BP LESS 90: HCPCS | Performed by: FAMILY MEDICINE

## 2021-08-17 PROCEDURE — G8752 SYS BP LESS 140: HCPCS | Performed by: FAMILY MEDICINE

## 2021-08-17 PROCEDURE — 1101F PT FALLS ASSESS-DOCD LE1/YR: CPT | Performed by: FAMILY MEDICINE

## 2021-08-17 PROCEDURE — G0439 PPPS, SUBSEQ VISIT: HCPCS | Performed by: FAMILY MEDICINE

## 2021-08-17 PROCEDURE — 99214 OFFICE O/P EST MOD 30 MIN: CPT | Performed by: FAMILY MEDICINE

## 2021-08-17 PROCEDURE — G8536 NO DOC ELDER MAL SCRN: HCPCS | Performed by: FAMILY MEDICINE

## 2021-08-17 PROCEDURE — G8427 DOCREV CUR MEDS BY ELIG CLIN: HCPCS | Performed by: FAMILY MEDICINE

## 2021-08-17 PROCEDURE — G8399 PT W/DXA RESULTS DOCUMENT: HCPCS | Performed by: FAMILY MEDICINE

## 2021-08-17 PROCEDURE — G0463 HOSPITAL OUTPT CLINIC VISIT: HCPCS | Performed by: FAMILY MEDICINE

## 2021-08-17 PROCEDURE — 1090F PRES/ABSN URINE INCON ASSESS: CPT | Performed by: FAMILY MEDICINE

## 2021-08-17 PROCEDURE — G8510 SCR DEP NEG, NO PLAN REQD: HCPCS | Performed by: FAMILY MEDICINE

## 2021-08-17 PROCEDURE — G8417 CALC BMI ABV UP PARAM F/U: HCPCS | Performed by: FAMILY MEDICINE

## 2021-08-17 RX ORDER — LORAZEPAM 1 MG/1
.5-1 TABLET ORAL
Qty: 90 TABLET | Refills: 1 | Status: SHIPPED | OUTPATIENT
Start: 2021-08-17 | End: 2022-02-18 | Stop reason: SDUPTHER

## 2021-08-17 NOTE — PROGRESS NOTES
HISTORY OF PRESENT ILLNESS  María Cardenas is a [de-identified] y.o. female. HPI   Follow up on chronic medical problems. Had cardiac catheterization was 12/2019 and was decided to medically treat the proximal RCA and distal second DIAG lesion. Overall doing well. Had episode of SSS/CHB in May 2020 and had PM placement. EF at last cath was at 55-60%. Similar CAD as prior cath 12/2019. Had Mohls procedure for basal cell skin cancer on the right side of the nose. Overall has healed up well. Cardiovascular Review:  The patient has coronary artery disease and hyperlipidemia. She was started on ranexa and crestor. Cardiologist is Dr. Lynda Valencia. Diet and Lifestyle: generally follows a low fat low cholesterol diet, generally follows a low sodium diet, exercises sporadically, we discussed increasing her exercise regimen. Home BP Monitoring: is not measured at home. Pertinent ROS: taking medications as instructed, no medication side effects noted, no TIA's, no chest pain on exertion, no dyspnea on exertion, no swelling of ankles, no palpitations. Hypertriglyceridemia follow up:  Compliant w/ meds, low fat, low cholesterol diet. She is on crestor. Triglyceride are still slight elevated. Discussed adding trico is numbers. Tolerating well. Exercising some. No muscle nor abdominal pain, no skin discoloration. Osteoarthritis:  Patient has osteoarthritis. Has various joint aches but overall doing well. Symptoms onset: problem is longstanding. Rheumatological ROS: stable, mild-to-moderate joint symptoms intermittently, reasonably well controlled by PRN meds. Response to treatment plan: stable and intermittent. Glucose intolerance reveiw:  She has IGT. Diabetic ROS - further diabetic ROS: no polyuria or polydipsia, no chest pain, dyspnea or TIA's, no numbness, tingling or pain in extremities, no unusual visual symptoms. Lab review: orders written for new lab studies as appropriate; see orders. Patient Active Problem List   Diagnosis Code    Hypertriglyceridemia E78.1    GERD (gastroesophageal reflux disease) K21.9    OA (osteoarthritis) M19.90    Vitamin D deficiency E55.9    Anxiety F41.9    Environmental allergies Z91.09    Encounter for medication monitoring Z51.81    IGT (impaired glucose tolerance) R73.02    NSVT (nonsustained ventricular tachycardia) ([facility]) I47.2    S/P placement of cardiac pacemaker Z95.0    S/P cardiac cath Z98.890    Mixed hyperlipidemia E78.2    Coronary artery disease involving native coronary artery of native heart without angina pectoris I25.10       Current Outpatient Medications   Medication Sig Dispense Refill    pantoprazole (PROTONIX) 40 mg tablet Take 1 Tab by mouth daily. 90 Tab 3    LORazepam (ATIVAN) 1 mg tablet Take 0.5-1 Tabs by mouth every eight (8) hours as needed for Anxiety. 90 Tab 1    metoprolol succinate (TOPROL-XL) 25 mg XL tablet Take 1 Tab by mouth nightly. 90 Tab 3    ranolazine ER (Ranexa) 500 mg SR tablet Take 1 Tab by mouth two (2) times a day. 180 Tab 3    rosuvastatin (CRESTOR) 20 mg tablet Take 1 Tab by mouth nightly. 90 Tab 3    aspirin delayed-release 81 mg tablet Take 1 Tab by mouth daily. 30 Tab 5    cetirizine (ZYRTEC) 10 mg tablet Take 10 mg by mouth daily.  Cholecalciferol, Vitamin D3, (VITAMIN D3) 1,000 unit Cap Take 1 Cap by mouth daily.            Allergies   Allergen Reactions    Macrobid [Nitrofurantoin Monohyd/M-Cryst] Rash    Pcn [Penicillins] Hives       Past Medical History:   Diagnosis Date    AV block, 3rd degree ([address] 5/20/2020    GERD (gastroesophageal reflux disease) 2/23/2010    Hypertriglyceridemia 2/23/2010    Hypovitaminosis D     NSVT (nonsustained ventricular tachycardia) ([address] 5/20/2020    OA (osteoarthritis) 2/23/2010    Osteoarthritis     S/P cardiac cath 5/20/2020    S/P placement of cardiac pacemaker 5/20/2020 5/20/2020 Medtronic dual chamber pacemaker implant     Vitamin D deficiency 2010       Past Surgical History:   Procedure Laterality Date    HX ACL RECONSTRUCTION      HX CATARACT REMOVAL  2018    right eye    HX HEENT      cervical neck stenosis s/p cervical release    HX HERNIA REPAIR  2786    umbilical    HX HIP REPLACEMENT  2007    right    HX HIP REPLACEMENT  2007    left    HX ORTHOPAEDIC  2008    cervical release    [place] COLONOSCOPY FLX DX W/COLLJ SPEC WHEN PFRMD  2006    [place] COLONOSCOPY FLX DX W/COLLJ SPEC WHEN PFRMD  2011    Dr. Ferny Limon INS NEW/RPLCMT PRM PM W/TRANSV ELTRD ATRIAL&VENT N/A 2020    Insert Ppm Dual performed by Frederick Vitale MD at \[facility]\"" CARDIAC CATH LAB       Family History   Problem Relation Age of Onset    Hypertension Mother     Heart Failure Mother     Cancer Father         colon    Lung Disease Brother     Arthritis-rheumatoid Brother     Heart Disease Brother        Social History     Tobacco Use    Smoking status: Former Smoker     Packs/day: 0.25     Years: 55.00     Pack years: 13.75     Quit date: 2019     Years since quittin.7    Smokeless tobacco: Never Used   Substance Use Topics    Alcohol use:  Yes     Alcohol/week: 0.0 standard drinks     Comment: 1 drink a month        Lab Results   Component Value Date/Time    WBC 8.5 2021 12:25 PM    HGB 14.1 2021 12:25 PM    HCT 43.1 2021 12:25 PM    PLATELET 229 10/43/9634 12:25 PM    MCV 90 2021 12:25 PM     Lab Results   Component Value Date/Time    Cholesterol, total 157 2021 09:17 AM    HDL Cholesterol 47 2021 09:17 AM    LDL, calculated 68 2021 09:17 AM    LDL, calculated 70 2020 10:57 AM    LDL-C, External 134 2015 07:42 AM    Triglyceride 262 (H) 2021 09:17 AM    CHOL/HDL Ratio 3.3 2010 10:02 AM     Lab Results   Component Value Date/Time    TSH 0.196 (L) 10/02/2019 11:28 AM    T3 Uptake 20 (L) 10/02/2019 11:28 AM    T4, Free 1.15 10/02/2019 11:28 AM    T4, Total 6.3 10/02/2019 11:28 AM      Lab Results   Component Value Date/Time    Sodium 143 07/26/2021 09:17 AM    Potassium 4.5 07/26/2021 09:17 AM    Chloride 104 07/26/2021 09:17 AM    CO2 26 07/26/2021 09:17 AM    Anion gap 5 05/21/2020 05:11 AM    Glucose 124 (H) 07/26/2021 09:17 AM    BUN 15 07/26/2021 09:17 AM    Creatinine 1.08 (H) 07/26/2021 09:17 AM    BUN/Creatinine ratio 14 07/26/2021 09:17 AM    GFR est AA 56 (L) 07/26/2021 09:17 AM    GFR est non-AA 49 (L) 07/26/2021 09:17 AM    Calcium 9.9 07/26/2021 09:17 AM    Bilirubin, total 0.4 07/26/2021 09:17 AM    ALT (SGPT) 14 07/26/2021 09:17 AM    Alk. phosphatase 104 07/26/2021 09:17 AM    Protein, total 6.7 07/26/2021 09:17 AM    Albumin 4.6 07/26/2021 09:17 AM    Globulin 3.0 05/19/2020 08:04 PM    A-G Ratio 2.2 07/26/2021 09:17 AM      Lab Results   Component Value Date/Time    Hemoglobin A1c 6.2 (H) 03/27/2013 08:49 AM    Hemoglobin A1c (POC) 5.9 02/17/2021 12:25 PM    Hemoglobin A1c, External 6.2 06/26/2015 12:00 AM         Review of Systems   Constitutional: Negative for malaise/fatigue. HENT: Negative for congestion. Eyes: Negative for blurred vision. Respiratory: Negative for cough and shortness of breath. Cardiovascular: Negative for chest pain, palpitations and leg swelling. Gastrointestinal: Negative for abdominal pain, constipation and heartburn. Genitourinary: Negative for dysuria, frequency and urgency. Neurological: Negative for dizziness, tingling and headaches. Endo/Heme/Allergies: Negative for environmental allergies. Psychiatric/Behavioral: Negative for depression. The patient does not have insomnia. Physical Exam  Vitals and nursing note reviewed. Constitutional:       Appearance: Normal appearance. She is well-developed.       Comments: /69 (BP 1 Location: Left arm, BP Patient Position: Sitting)   Pulse 74   Temp 97.8 °F (36.6 °C) (Oral)   Resp 18   Ht 5' 1\" (1.549 m)   Wt 182 lb 9.6 oz (82.8 kg) SpO2 96%   BMI 34.50 kg/m²      HENT:      Right Ear: Tympanic membrane and ear canal normal.      Left Ear: Tympanic membrane and ear canal normal.      Nose: No mucosal edema. Neck:      Thyroid: No thyromegaly. Vascular: No carotid bruit. Cardiovascular:      Rate and Rhythm: Normal rate and regular rhythm. Pulses: Normal pulses. Heart sounds: Normal heart sounds. Pulmonary:      Effort: Pulmonary effort is normal.      Breath sounds: Normal breath sounds. Chest:      Breasts:         Right: Normal.         Left: Normal.   Abdominal:      General: Bowel sounds are normal.      Palpations: Abdomen is soft. There is no mass. Tenderness: There is no abdominal tenderness. Musculoskeletal:         General: No swelling. Normal range of motion. Cervical back: Normal range of motion and neck supple. Right lower leg: No edema. Left lower leg: No edema. Lymphadenopathy:      Cervical: No cervical adenopathy. Upper Body:      Right upper body: No supraclavicular, axillary or pectoral adenopathy. Left upper body: No supraclavicular, axillary or pectoral adenopathy. Skin:     General: Skin is warm and dry. Neurological:      General: No focal deficit present. Mental Status: She is alert and oriented to person, place, and time. Psychiatric:         Mood and Affect: Mood normal.         ASSESSMENT and PLAN  Diagnoses and all orders for this visit:    1. Medicare annual wellness visit, subsequent    2. Mixed hyperlipidemia  Continue to monitor. Work on diet and exercise. 3. Coronary artery disease involving native coronary artery of native heart without angina pectoris//  4. SSS (sick sinus syndrome) ([facility])//  5. Cardiac pacemaker in situ  Stable     6. IGT (impaired glucose tolerance)  -     HEMOGLOBIN A1C WITH EAG; Future    7. Primary osteoarthritis involving multiple joints  Stable     8. Gastroesophageal reflux disease without esophagitis  Stable     9. Anxiety  -     Refill LORazepam (ATIVAN) 1 mg tablet; Take 0.5-1 Tablets by mouth every eight (8) hours as needed for Anxiety. 10. Hypovitaminosis D  -     VITAMIN D, 25 HYDROXY; Future    11. Encounter for medication monitoring  -     URINALYSIS W/ REFLEX CULTURE; Future  -     CBC W/O DIFF; Future      Follow-up and Dispositions    · Return in about 6 months (around 2/17/2022). reviewed diet, exercise and weight control  cardiovascular risk and specific lipid/LDL goals reviewed  reviewed medications and side effects in detail  specific diabetic recommendations: low cholesterol diet, weight control and daily exercise discussed and glycohemoglobin and other lab monitoring discussed     I have discussed diagnosis listed in this note with pt and/or family. I have discussed treatment plans and options and the risk/benefit analysis of those options, including safe use of medications and possible medication side effects. Through the use of shared decision making we have agreed to the above plan. The patient has received an after-visit summary and questions were answered concerning future plans and follow up. Advise pt of any urgent changes then to proceed to the ER.

## 2021-08-17 NOTE — PROGRESS NOTES
Chief Complaint   Patient presents with    Annual Wellness Visit     Medicare Wellness       Mammogram 10/1/2018      Colonoscopy 12/14/2016 by Dr. Ana Paula Huitron- repeat in 5 years      1. Have you been to the ER, urgent care clinic since your last visit? Hospitalized since your last visit?no    2. Have you seen or consulted any other health care providers outside of the [address] [place] [address] since your last visit? Include any pap smears or colon screening.  No

## 2021-08-17 NOTE — PATIENT INSTRUCTIONS
Medicare Wellness Visit, Female     The best way to live healthy is to have a lifestyle where you eat a well-balanced diet, exercise regularly, limit alcohol use, and quit all forms of tobacco/nicotine, if applicable. Regular preventive services are another way to keep healthy. Preventive services (vaccines, screening tests, monitoring & exams) can help personalize your care plan, which helps you manage your own care. Screening tests can find health problems at the earliest stages, when they are easiest to treat. [facility] follows the current, evidence-based guidelines published by the [facility] ([facility]) when recommending preventive services for our patients. Because we follow these guidelines, sometimes recommendations change over time as research supports it. (For example, mammograms used to be recommended annually. Even though Medicare will still pay for an annual mammogram, the newer guidelines recommend a mammogram every two years for women of average risk). Of course, you and your doctor may decide to screen more often for some diseases, based on your risk and your co-morbidities (chronic disease you are already diagnosed with). Preventive services for you include:  - Medicare offers their members a free annual wellness visit, which is time for you and your primary care provider to discuss and plan for your preventive service needs. Take advantage of this benefit every year!  -All adults over the age of 72 should receive the recommended pneumonia vaccines. Current USPSTF guidelines recommend a series of two vaccines for the best pneumonia protection.   -All adults should have a flu vaccine yearly and a tetanus vaccine every 10 years.   -All adults age 48 and older should receive the shingles vaccines (series of two vaccines).       -All adults age 38-68 who are overweight should have a diabetes screening test once every three years.   -All adults born between [birth year] and 1964 should be screened once for Hepatitis C.  -Other screening tests and preventive services for persons with diabetes include: an eye exam to screen for diabetic retinopathy, a kidney function test, a foot exam, and stricter control over your cholesterol.   -Cardiovascular screening for adults with routine risk involves an electrocardiogram (ECG) at intervals determined by your doctor.   -Colorectal cancer screenings should be done for adults age 54-65 with no increased risk factors for colorectal cancer. There are a number of acceptable methods of screening for this type of cancer. Each test has its own benefits and drawbacks. Discuss with your doctor what is most appropriate for you during your annual wellness visit. The different tests include: colonoscopy (considered the best screening method), a fecal occult blood test, a fecal DNA test, and sigmoidoscopy.    -A bone mass density test is recommended when a woman turns 65 to screen for osteoporosis. This test is only recommended one time, as a screening. Some providers will use this same test as a disease monitoring tool if you already have osteoporosis. -Breast cancer screenings are recommended every other year for women of normal risk, age 54-69.  -Cervical cancer screenings for women over age 72 are only recommended with certain risk factors. Here is a list of your current Health Maintenance items (your personalized list of preventive services) with a due date: There are no preventive care reminders to display for this patient.

## 2021-08-17 NOTE — PROGRESS NOTES
This is a Subsequent Medicare Annual Wellness Exam (AWV) (Performed 12 months after IPPE or effective date of Medicare Part B enrollment)    I have reviewed the patient's medical history in detail and updated the computerized patient record. History     Patient Active Problem List   Diagnosis Code    Hypertriglyceridemia E78.1    GERD (gastroesophageal reflux disease) K21.9    OA (osteoarthritis) M19.90    Vitamin D deficiency E55.9    Anxiety F41.9    Environmental allergies Z91.09    Encounter for medication monitoring Z51.81    IGT (impaired glucose tolerance) R73.02    NSVT (nonsustained ventricular tachycardia) ([facility]) I47.2    S/P placement of cardiac pacemaker Z95.0    S/P cardiac cath Z98.890    Mixed hyperlipidemia E78.2    Coronary artery disease involving native coronary artery of native heart without angina pectoris I25.10       Current Outpatient Medications   Medication Sig Dispense Refill    pantoprazole (PROTONIX) 40 mg tablet Take 1 Tab by mouth daily. 90 Tab 3    LORazepam (ATIVAN) 1 mg tablet Take 0.5-1 Tabs by mouth every eight (8) hours as needed for Anxiety. 90 Tab 1    metoprolol succinate (TOPROL-XL) 25 mg XL tablet Take 1 Tab by mouth nightly. 90 Tab 3    ranolazine ER (Ranexa) 500 mg SR tablet Take 1 Tab by mouth two (2) times a day. 180 Tab 3    rosuvastatin (CRESTOR) 20 mg tablet Take 1 Tab by mouth nightly. 90 Tab 3    aspirin delayed-release 81 mg tablet Take 1 Tab by mouth daily. 30 Tab 5    cetirizine (ZYRTEC) 10 mg tablet Take 10 mg by mouth daily.  Cholecalciferol, Vitamin D3, (VITAMIN D3) 1,000 unit Cap Take 1 Cap by mouth daily.            Allergies   Allergen Reactions    Macrobid [Nitrofurantoin Monohyd/M-Cryst] Rash    Pcn [Penicillins] Hives       Past Medical History:   Diagnosis Date    AV block, 3rd degree ([address] 5/20/2020    GERD (gastroesophageal reflux disease) 2/23/2010    Hypertriglyceridemia 2/23/2010    Hypovitaminosis D     [facility]ust[facility] 2020    OA (osteoarthritis) 2010    Osteoarthritis     S/P cardiac cath 2020    S/P placement of cardiac pacemaker 2020 Medtronic dual chamber pacemaker implant     Vitamin D deficiency 2010       Past Surgical History:   Procedure Laterality Date    HX ACL RECONSTRUCTION  1998    HX CATARACT REMOVAL  2018    right eye    HX HEENT      cervical neck stenosis s/p cervical release    HX HERNIA REPAIR  6237    umbilical    HX HIP REPLACEMENT  2007    right    HX HIP REPLACEMENT  2007    left    HX ORTHOPAEDIC  2008    cervical release    [place] COLONOSCOPY FLX DX W/COLLJ SPEC WHEN PFRMD  2006    [place] COLONOSCOPY FLX DX W/COLLJ SPEC WHEN PFRMD  2011    Dr. Brody Walters INS NEW/RPLCMT PRM PM W/TRANSV ELTRD ATRIAL&VENT N/A 2020    Insert Ppm Dual performed by Cammy Vigil MD at [facility] CARDIAC CATH LAB       Family History   Problem Relation Age of Onset    Hypertension Mother     Heart Failure Mother     Cancer Father         colon    Lung Disease Brother     Arthritis-rheumatoid Brother     Heart Disease Brother        Social History     Tobacco Use    Smoking status: Former Smoker     Packs/day: 0.25     Years: 55.00     Pack years: 13.75     Quit date: 2019     Years since quittin.7    Smokeless tobacco: Never Used   Substance Use Topics    Alcohol use: Yes     Alcohol/week: 0.0 standard drinks     Comment: 1 drink a month         Depression Risk Factor Screening:     PHQ over the last two weeks    Little interest or pleasure in doing things Not at all   Feeling down, depressed or hopeless Not at all   Total Score PHQ 2 0     Alcohol Risk Factor Screening: You do not drink alcohol or very rarely. Functional Ability and Level of Safety:   Hearing Loss  Hearing is good. Activities of Daily Living  The home contains: discussed safety equipment.   Patient does total self care    Fall RiskFall Risk Assessment, last 12 mths    Able to walk? Yes   Fall in past 12 months? No     Functional Ability:   Does the patient exhibit a steady gait? yes    How long did it take the patient to get up and walk from a sitting position? seconds    Is the patient self reliant? (ie can do own laundry, meals, household chores)  yes   Does the patient handle his/her own medications? yes   Does the patient handle his/her own money? yes   Is the patients home safe (ie good lighting, handrails on stairs and bath, etc.)? yes   Did you notice or did patient express any hearing difficulties? no   Did you notice or did patient express any vision difficulties? no        Advance Care Planning:   Patient was offered the opportunity to discuss advance care planning:  yes    Does patient have an Advance Directive:  no   If no, did you provide information on Caring Connections? yes      Abuse Screen  Patient is not abused    Cognitive Screening   Evaluation of Cognitive Function:  Has your family/caregiver stated any concerns about your memory: no      Patient Care Team   Patient Care Team:  Roland Flores MD as PCP - General    Assessment/Plan   Education and counseling provided:  Are appropriate based on today's review and evaluation  End-of-Life planning (with patient's consent)    ASSESSMENT and PLAN    Medicare Annual Wellness  Continue current treatment plan. Continue annual follow up. I have discussed diagnosis listed in this note with pt and/or family. I have discussed treatment plans and options and the risk/benefit analysis of those options, including safe use of medications and possible medication side effects. Through the use of shared decision making we have agreed to the above plan. The patient has received an after-visit summary and questions were answered concerning future plans and follow up. Advise pt of any urgent changes then to proceed to the ER.

## 2021-08-19 LAB
25(OH)D3 SERPL-MCNC: 39 NG/ML (ref 30–100)
ERYTHROCYTE [DISTWIDTH] IN BLOOD BY AUTOMATED COUNT: 12.9 % (ref 11.5–14.5)
EST. AVERAGE GLUCOSE BLD GHB EST-MCNC: 131 MG/DL
HBA1C MFR BLD: 6.2 % (ref 4–5.6)
HCT VFR BLD AUTO: 44.8 % (ref 35–47)
HGB BLD-MCNC: 13.6 G/DL (ref 11.5–16)
MCH RBC QN AUTO: 29.5 PG (ref 26–34)
MCHC RBC AUTO-ENTMCNC: 30.4 G/DL (ref 30–36.5)
MCV RBC AUTO: 97.2 FL (ref 80–99)
NRBC # BLD: 0 K/UL (ref 0–0.01)
NRBC BLD-RTO: 0 PER 100 WBC
PLATELET # BLD AUTO: 307 K/UL (ref 150–400)
PMV BLD AUTO: 10.9 FL (ref 8.9–12.9)
RBC # BLD AUTO: 4.61 M/UL (ref 3.8–5.2)
WBC # BLD AUTO: 7.8 K/UL (ref 3.6–11)

## 2021-10-04 NOTE — PROGRESS NOTES
ELECTROPHYSIOLOGY        Subjective:      Gina Vasquez is a [de-identified] y.o. female is here for EP follow up. The patient denies chest pain/ shortness of breath, orthopnea, PND, LE edema, palpitations, syncope, presyncope or fatigue. Volunteers at [facility].     Patient Active Problem List    Diagnosis Date Noted    Mixed hyperlipidemia 08/04/2021    Coronary artery disease involving native coronary artery of native heart without angina pectoris 08/04/2021    NSVT (nonsustained ventricular tachycardia) ([address] 05/20/2020    S/P placement of cardiac pacemaker 05/20/2020    S/P cardiac cath 05/20/2020    IGT (impaired glucose tolerance) 03/28/2017    Encounter for medication monitoring 09/28/2016    Environmental allergies 09/21/2011    Anxiety 03/24/2011    Hypertriglyceridemia 02/23/2010    GERD (gastroesophageal reflux disease) 02/23/2010    OA (osteoarthritis) 02/23/2010    Vitamin D deficiency 02/23/2010      Louie Baez MD  Past Medical History:   Diagnosis Date    AV block, 3rd degree ([address] 5/20/2020    Cancer ([address] 07/2021    basal cell- nose    GERD (gastroesophageal reflux disease) 2/23/2010    Hypertriglyceridemia 2/23/2010    Hypovitaminosis D     NSVT (nonsustained ventricular tachycardia) ([address] 5/20/2020    OA (osteoarthritis) 2/23/2010    Osteoarthritis     Pacemaker     S/P cardiac cath 5/20/2020    S/P placement of cardiac pacemaker 5/20/2020 5/20/2020 Medtronic dual chamber pacemaker implant     Vitamin D deficiency 2/23/2010      Past Surgical History:   Procedure Laterality Date    HX ACL RECONSTRUCTION  1998    HX CATARACT REMOVAL  06/2018    right eye    HX HEENT  12/08    cervical neck stenosis s/p cervical release    HX HERNIA REPAIR  6924    umbilical    HX HIP REPLACEMENT  6/2007    right    HX HIP REPLACEMENT  12/2007    left    HX ORTHOPAEDIC  12/2008    cervical release    HX SKIN BIOPSY  07/2021    nose    SC COLONOSCOPY FLX DX W/COLLJ SPEC WHEN PFRMD  2006    CT COLONOSCOPY FLX DX W/COLLJ SPEC WHEN PFRMD  2011    Dr. Shawn Washington INS NEW/RPLCMT PRM PM W/TRANSV ELTRD ATRIAL&VENT N/A 2020    Insert Ppm Dual performed by Ana María Hutton MD at [facility] CARDIAC CATH LAB     Allergies   Allergen Reactions    Macrobid [Nitrofurantoin Monohyd/M-Cryst] Rash    Pcn [Penicillins] Hives      Family History   Problem Relation Age of Onset    Hypertension Mother     Heart Failure Mother     Cancer Father         colon    Lung Disease Brother     Arthritis-rheumatoid Brother     Heart Disease Brother     negative for cardiac disease  Social History     Socioeconomic History    Marital status:      Spouse name: Not on file    Number of children: Not on file    Years of education: Not on file    Highest education level: Not on file   Tobacco Use    Smoking status: Former Smoker     Packs/day: 0.25     Years: 55.00     Pack years: 13.75     Quit date: 2019     Years since quittin.8    Smokeless tobacco: Never Used   Vaping Use    Vaping Use: Never used   Substance and Sexual Activity    Alcohol use: Not Currently     Alcohol/week: 0.0 standard drinks    Drug use: No    Sexual activity: Not Currently     Social Determinants of Health     Financial Resource Strain:     Difficulty of Paying Living Expenses:    Food Insecurity:     Worried About Running Out of Food in the Last Year:     Ran Out of Food in the Last Year:    Transportation Needs:     Lack of Transportation (Medical):      Lack of Transportation (Non-Medical):    Physical Activity:     Days of Exercise per Week:     Minutes of Exercise per Session:    Stress:     Feeling of Stress :    Social Connections:     Frequency of Communication with Friends and Family:     Frequency of Social Gatherings with Friends and Family:     Attends [employer] Services:     Active Member of Clubs or Organizations:     Attends Club or Organization Meetings:     Marital Status:      Current Outpatient Medications   Medication Sig    LORazepam (ATIVAN) 1 mg tablet Take 0.5-1 Tablets by mouth every eight (8) hours as needed for Anxiety.  pantoprazole (PROTONIX) 40 mg tablet Take 1 Tab by mouth daily.  metoprolol succinate (TOPROL-XL) 25 mg XL tablet Take 1 Tab by mouth nightly.  ranolazine ER (Ranexa) 500 mg SR tablet Take 1 Tab by mouth two (2) times a day.  rosuvastatin (CRESTOR) 20 mg tablet Take 1 Tab by mouth nightly.  aspirin delayed-release 81 mg tablet Take 1 Tab by mouth daily.  cetirizine (ZYRTEC) 10 mg tablet Take 10 mg by mouth daily.  Cholecalciferol, Vitamin D3, (VITAMIN D3) 1,000 unit Cap Take 1 Cap by mouth daily. No current facility-administered medications for this visit. Vitals:    10/06/21 1036 10/06/21 1051   BP: (!) 144/82 (!) 146/82   Pulse: 74    Resp: 18    SpO2: 97%    Weight: 183 lb 6.4 oz (83.2 kg)    Height: 5' 1\" (1.549 m)        I have reviewed the nurses notes, vitals, problem list, allergy list, medical history, family, social history and medications. Review of Symptoms:  11 systems reviewed, negative other than as stated in the HPI      Physical Exam:      General: Well developed, in no acute distress. HEENT: Eyes - PERRL  Heart:  Normal S1/S2 negative S3 or S4. Regular, no murmur  Respiratory: Clear bilaterally x 4, no wheezing or rales  Extremities:  No edema, no cyanosis. Musculoskeletal: No clubbing  Neuro: A&Ox3, speech clear  Skin: No visible rashes or lesions. Non diaphoretic.  No visible ulcers  Vascular: 2+ pulses symmetric in all extremities  Psych - judgement intact and orientation is wnl       Cardiographics    Results for orders placed or performed during the hospital encounter of 05/19/20   EKG, 12 LEAD, INITIAL   Result Value Ref Range    Ventricular Rate 132 BPM    Atrial Rate 133 BPM    QRS Duration 144 ms    Q-T Interval 372 ms    QTC Calculation (Bezet) 551 ms Calculated R Axis -65 degrees    Calculated T Axis 86 degrees    Diagnosis       Wide QRS tachycardia  Left axis deviation  Left bundle branch block    Confirmed by Lashawn Hurt ([id number]) on 5/20/2020 9:13:03 AM           Lab Results   Component Value Date/Time    WBC 7.8 08/17/2021 11:17 AM    HGB 13.6 08/17/2021 11:17 AM    HCT 44.8 08/17/2021 11:17 AM    PLATELET 482 17/56/2618 11:17 AM    MCV 97.2 08/17/2021 11:17 AM      Lab Results   Component Value Date/Time    Sodium 143 07/26/2021 09:17 AM    Potassium 4.5 07/26/2021 09:17 AM    Chloride 104 07/26/2021 09:17 AM    CO2 26 07/26/2021 09:17 AM    Anion gap 5 05/21/2020 05:11 AM    Glucose 124 (H) 07/26/2021 09:17 AM    BUN 15 07/26/2021 09:17 AM    Creatinine 1.08 (H) 07/26/2021 09:17 AM    BUN/Creatinine ratio 14 07/26/2021 09:17 AM    GFR est AA 56 (L) 07/26/2021 09:17 AM    GFR est non-AA 49 (L) 07/26/2021 09:17 AM    Calcium 9.9 07/26/2021 09:17 AM    Bilirubin, total 0.4 07/26/2021 09:17 AM    Alk. phosphatase 104 07/26/2021 09:17 AM    Protein, total 6.7 07/26/2021 09:17 AM    Albumin 4.6 07/26/2021 09:17 AM    Globulin 3.0 05/19/2020 08:04 PM    A-G Ratio 2.2 07/26/2021 09:17 AM    ALT (SGPT) 14 07/26/2021 09:17 AM      Lab Results   Component Value Date/Time    TSH 0.196 (L) 10/02/2019 11:28 AM           Assessment:           ICD-10-CM ICD-9-CM    1. Sick sinus syndrome (HCC)  I49.5 427.81 ECHO ADULT COMPLETE   2. AV block, 3rd degree (HCC)  I44.2 426.0 ECHO ADULT COMPLETE   3. LBBB (left bundle branch block)  I44.7 426.3 ECHO ADULT COMPLETE   4. Cardiac pacemaker in situ  Z95.0 V45.01 ECHO ADULT COMPLETE   5. NSVT (nonsustained ventricular tachycardia) ([facility])  I47.2 427.1 ECHO ADULT COMPLETE     No orders of the defined types were placed in this encounter. Plan:     Ms Merly Posada is here for annual follow up and device interrogation. She is A paced 44% for sick sinus, 44% RVP for CHB and asymptomatic.  Cardiac cath in 2020 with mod CAD and normal EF. One NSVT episode for 7 beats. With her RVP, I will repeat echo. During this visit,  the patient and I had a comprehensive discussion of device management using principles of shared decision making. we reviewed device therapy, including the potential risks and benefits of device management. These risks include death, myocardial infarction, stroke, cardiac perforation, vascular injury, injury, pacing induced cardiomyopathy, inappropriate shocks (defibrillator) and other less severe complications. The patient demonstrated a clear understanding of these issues during out discussion. Our plans, determined together after thorough consideration, are outlined else where in this note. Enrolled in remote monitoring and she will return in 1 year. Addressed all patient questions and concerns at this visit. Continue medical management for ASHD. Discussed side effects, efficacy and good medication compliance with pt re: ranexa, bb, statin. Thank you for allowing me to participate in Katherleen Baumgarten 's care. Columba Holden NP     Patient was made aware during visit today that all testing completed would be instantaneously available on their MyChart for review. Discussed that these results will be made available to the provider at the same time. They were advised to wait at least 3 business days to allow for provider's interpretation of results with follow-up before calling our office with concerns about their results.

## 2021-10-06 ENCOUNTER — OFFICE VISIT (OUTPATIENT)
Dept: CARDIOLOGY CLINIC | Age: 81
End: 2021-10-06
Payer: MEDICARE

## 2021-10-06 VITALS
RESPIRATION RATE: 18 BRPM | OXYGEN SATURATION: 97 % | SYSTOLIC BLOOD PRESSURE: 146 MMHG | HEART RATE: 74 BPM | DIASTOLIC BLOOD PRESSURE: 82 MMHG | HEIGHT: 61 IN | WEIGHT: 183.4 LBS | BODY MASS INDEX: 34.63 KG/M2

## 2021-10-06 DIAGNOSIS — I49.5 SICK SINUS SYNDROME (HCC): Primary | ICD-10-CM

## 2021-10-06 DIAGNOSIS — I47.29 NSVT (NONSUSTAINED VENTRICULAR TACHYCARDIA): ICD-10-CM

## 2021-10-06 DIAGNOSIS — I44.2 AV BLOCK, 3RD DEGREE (HCC): ICD-10-CM

## 2021-10-06 DIAGNOSIS — Z95.0 CARDIAC PACEMAKER IN SITU: ICD-10-CM

## 2021-10-06 DIAGNOSIS — I49.5 SICK SINUS SYNDROME (HCC): ICD-10-CM

## 2021-10-06 DIAGNOSIS — Z95.0 CARDIAC PACEMAKER IN SITU: Primary | ICD-10-CM

## 2021-10-06 DIAGNOSIS — I44.7 LBBB (LEFT BUNDLE BRANCH BLOCK): ICD-10-CM

## 2021-10-06 PROCEDURE — 1101F PT FALLS ASSESS-DOCD LE1/YR: CPT | Performed by: NURSE PRACTITIONER

## 2021-10-06 PROCEDURE — G8432 DEP SCR NOT DOC, RNG: HCPCS | Performed by: NURSE PRACTITIONER

## 2021-10-06 PROCEDURE — G8417 CALC BMI ABV UP PARAM F/U: HCPCS | Performed by: NURSE PRACTITIONER

## 2021-10-06 PROCEDURE — 99214 OFFICE O/P EST MOD 30 MIN: CPT | Performed by: NURSE PRACTITIONER

## 2021-10-06 PROCEDURE — G8427 DOCREV CUR MEDS BY ELIG CLIN: HCPCS | Performed by: NURSE PRACTITIONER

## 2021-10-06 PROCEDURE — G0463 HOSPITAL OUTPT CLINIC VISIT: HCPCS | Performed by: NURSE PRACTITIONER

## 2021-10-06 PROCEDURE — 1090F PRES/ABSN URINE INCON ASSESS: CPT | Performed by: NURSE PRACTITIONER

## 2021-10-06 PROCEDURE — G8754 DIAS BP LESS 90: HCPCS | Performed by: NURSE PRACTITIONER

## 2021-10-06 PROCEDURE — G8753 SYS BP > OR = 140: HCPCS | Performed by: NURSE PRACTITIONER

## 2021-10-06 PROCEDURE — G8536 NO DOC ELDER MAL SCRN: HCPCS | Performed by: NURSE PRACTITIONER

## 2021-10-06 PROCEDURE — G8399 PT W/DXA RESULTS DOCUMENT: HCPCS | Performed by: NURSE PRACTITIONER

## 2021-10-06 PROCEDURE — 93280 PM DEVICE PROGR EVAL DUAL: CPT | Performed by: INTERNAL MEDICINE

## 2021-10-06 NOTE — PROGRESS NOTES
1. Have you been to the ER, urgent care clinic since your last visit? Hospitalized since your last visit? No    2. Have you seen or consulted any other health care providers outside of the [address] [place] [address] since your last visit? Include any pap smears or colon screening.  No       Chief Complaint   Patient presents with    Follow-up     Pt denies cardiac symptoms

## 2021-10-20 ENCOUNTER — TELEPHONE (OUTPATIENT)
Dept: CARDIOLOGY CLINIC | Age: 81
End: 2021-10-20

## 2021-10-20 NOTE — TELEPHONE ENCOUNTER
[facility] requesting cardiac clearance     Procedure: Colonoscopy/Endoscopy     Physician: Dr. Philomena Nelson     Date: TBD     Requesting recommendations for managing patient's pacemaker. Requesting device model type; verify if a magnet can be used during the procedure (if applicable).  Is the patient being treated for any comorbidities that would prevent them from being sedated by Propfol in a free standing endoscopy center?

## 2021-10-20 NOTE — TELEPHONE ENCOUNTER
[facility] requesting cardiac clearance    Procedure: Colonoscopy/Endoscopy    Physician: Dr. Enma Pal    Date: TBD    Requesting recommendations for managing patient's pacemaker. Requesting device model type; verify if a magnet can be used during the procedure (if applicable). Is the patient being treated for any comorbidities that would prevent them from being sedated by Propfol in a free standing endoscopy center?

## 2021-11-09 ENCOUNTER — ANCILLARY PROCEDURE (OUTPATIENT)
Dept: CARDIOLOGY CLINIC | Age: 81
End: 2021-11-09
Payer: MEDICARE

## 2021-11-09 VITALS
DIASTOLIC BLOOD PRESSURE: 82 MMHG | HEIGHT: 61 IN | BODY MASS INDEX: 34.55 KG/M2 | SYSTOLIC BLOOD PRESSURE: 146 MMHG | WEIGHT: 183 LBS

## 2021-11-09 DIAGNOSIS — I44.2 AV BLOCK, 3RD DEGREE (HCC): ICD-10-CM

## 2021-11-09 DIAGNOSIS — I44.7 LBBB (LEFT BUNDLE BRANCH BLOCK): ICD-10-CM

## 2021-11-09 DIAGNOSIS — I47.29 NSVT (NONSUSTAINED VENTRICULAR TACHYCARDIA): ICD-10-CM

## 2021-11-09 DIAGNOSIS — Z95.0 CARDIAC PACEMAKER IN SITU: ICD-10-CM

## 2021-11-09 DIAGNOSIS — I49.5 SICK SINUS SYNDROME (HCC): ICD-10-CM

## 2021-11-09 LAB
ECHO AO ASC DIAM: 3.64 CM
ECHO AO ROOT DIAM: 3.12 CM
ECHO AV AREA PEAK VELOCITY: 1.51 CM2
ECHO AV AREA VTI: 1.36 CM2
ECHO AV AREA/BSA PEAK VELOCITY: 0.8 CM2/M2
ECHO AV AREA/BSA VTI: 0.7 CM2/M2
ECHO AV MEAN GRADIENT: 6.91 MMHG
ECHO AV PEAK GRADIENT: 13.77 MMHG
ECHO AV PEAK VELOCITY: 185.53 CM/S
ECHO AV VTI: 38.5 CM
ECHO EST RA PRESSURE: 3 MMHG
ECHO LA AREA 4C: 19.72 CM2
ECHO LA MAJOR AXIS: 4.16 CM
ECHO LA MINOR AXIS: 2.29 CM
ECHO LA VOL 2C: 28.56 ML (ref 22–52)
ECHO LA VOL 4C: 51.06 ML (ref 22–52)
ECHO LA VOL BP: 44.6 ML (ref 22–52)
ECHO LA VOL/BSA BIPLANE: 24.51 ML/M2 (ref 16–28)
ECHO LA VOLUME INDEX A2C: 15.69 ML/M2 (ref 16–28)
ECHO LA VOLUME INDEX A4C: 28.05 ML/M2 (ref 16–28)
ECHO LV E' SEPTAL VELOCITY: 3.74 CM/S
ECHO LV EDV A2C: 68.1 ML
ECHO LV EDV A4C: 92.27 ML
ECHO LV EDV BP: 79.09 ML (ref 56–104)
ECHO LV EDV INDEX A4C: 50.7 ML/M2
ECHO LV EDV INDEX BP: 43.5 ML/M2
ECHO LV EDV NDEX A2C: 37.4 ML/M2
ECHO LV EJECTION FRACTION A2C: 48 PERCENT
ECHO LV EJECTION FRACTION A4C: 32 PERCENT
ECHO LV EJECTION FRACTION BIPLANE: 39.5 PERCENT (ref 55–100)
ECHO LV ESV A2C: 35.08 ML
ECHO LV ESV A4C: 62.77 ML
ECHO LV ESV BP: 47.84 ML (ref 19–49)
ECHO LV ESV INDEX A2C: 19.3 ML/M2
ECHO LV ESV INDEX A4C: 34.5 ML/M2
ECHO LV ESV INDEX BP: 26.3 ML/M2
ECHO LV INTERNAL DIMENSION DIASTOLIC: 5.07 CM (ref 3.9–5.3)
ECHO LV INTERNAL DIMENSION SYSTOLIC: 3.97 CM
ECHO LV ISOVOLUMETRIC RELAXATION TIME (IVRT): 57.09 MS
ECHO LV IVSD: 1.16 CM (ref 0.6–0.9)
ECHO LV MASS 2D: 237.6 G (ref 67–162)
ECHO LV MASS INDEX 2D: 130.5 G/M2 (ref 43–95)
ECHO LV POSTERIOR WALL DIASTOLIC: 1.23 CM (ref 0.6–0.9)
ECHO LVOT DIAM: 2.33 CM
ECHO LVOT PEAK GRADIENT: 1.75 MMHG
ECHO LVOT PEAK VELOCITY: 66.12 CM/S
ECHO LVOT SV: 52.4 ML
ECHO LVOT VTI: 12.34 CM
ECHO MV "A" WAVE DURATION: 176.97 MS
ECHO MV A VELOCITY: 102.81 CM/S
ECHO MV AREA PHT: 4.19 CM2
ECHO MV E DECELERATION TIME (DT): 181.19 MS
ECHO MV E VELOCITY: 37.3 CM/S
ECHO MV E/A RATIO: 0.36
ECHO MV E/E' SEPTAL: 9.97
ECHO MV PRESSURE HALF TIME (PHT): 52.54 MS
ECHO PVEIN A VELOCITY: 30.14 CM/S
ECHO RIGHT VENTRICULAR SYSTOLIC PRESSURE (RVSP): 19.88 MMHG
ECHO RV TAPSE: 1.72 CM (ref 1.5–2)
ECHO TV REGURGITANT MAX VELOCITY: 205.4 CM/S
ECHO TV REGURGITANT PEAK GRADIENT: 16.88 MMHG

## 2021-11-09 PROCEDURE — 93306 TTE W/DOPPLER COMPLETE: CPT | Performed by: INTERNAL MEDICINE

## 2021-11-15 ENCOUNTER — TELEPHONE (OUTPATIENT)
Dept: CARDIOLOGY CLINIC | Age: 81
End: 2021-11-15

## 2021-11-15 NOTE — TELEPHONE ENCOUNTER
Spoke with patient. Explained test results and that our  would be calling her to make appointment with Dr. Danell Dance.  Patient verbalized understanding.

## 2021-11-15 NOTE — TELEPHONE ENCOUNTER
----- Message from ADELINE Moreno sent at 11/15/2021  1:12 PM EST -----  Pls let pt know that her heart function is lower than normal. Per her last cath, she was normal. I would like her to return to discuss with Dr Koleen Boxer.

## 2021-11-15 NOTE — PROGRESS NOTES
Pls let pt know that her heart function is lower than normal. Per her last cath, she was normal. I would like her to return to discuss with Dr Kirk Colon.

## 2021-11-30 ENCOUNTER — OFFICE VISIT (OUTPATIENT)
Dept: CARDIOLOGY CLINIC | Age: 81
End: 2021-11-30
Payer: MEDICARE

## 2021-11-30 VITALS
HEART RATE: 86 BPM | OXYGEN SATURATION: 97 % | HEIGHT: 61 IN | RESPIRATION RATE: 16 BRPM | WEIGHT: 186 LBS | DIASTOLIC BLOOD PRESSURE: 80 MMHG | BODY MASS INDEX: 35.12 KG/M2 | SYSTOLIC BLOOD PRESSURE: 130 MMHG

## 2021-11-30 DIAGNOSIS — E78.2 MIXED HYPERLIPIDEMIA: ICD-10-CM

## 2021-11-30 DIAGNOSIS — I10 ESSENTIAL HYPERTENSION: ICD-10-CM

## 2021-11-30 DIAGNOSIS — I44.2 AV BLOCK, 3RD DEGREE (HCC): ICD-10-CM

## 2021-11-30 DIAGNOSIS — I47.29 NSVT (NONSUSTAINED VENTRICULAR TACHYCARDIA): Primary | ICD-10-CM

## 2021-11-30 DIAGNOSIS — I25.10 CORONARY ARTERY DISEASE INVOLVING NATIVE CORONARY ARTERY OF NATIVE HEART WITHOUT ANGINA PECTORIS: ICD-10-CM

## 2021-11-30 DIAGNOSIS — I44.7 LBBB (LEFT BUNDLE BRANCH BLOCK): ICD-10-CM

## 2021-11-30 DIAGNOSIS — Z95.0 CARDIAC PACEMAKER IN SITU: ICD-10-CM

## 2021-11-30 DIAGNOSIS — Z95.0 S/P PLACEMENT OF CARDIAC PACEMAKER: ICD-10-CM

## 2021-11-30 PROCEDURE — 1090F PRES/ABSN URINE INCON ASSESS: CPT | Performed by: INTERNAL MEDICINE

## 2021-11-30 PROCEDURE — G8399 PT W/DXA RESULTS DOCUMENT: HCPCS | Performed by: INTERNAL MEDICINE

## 2021-11-30 PROCEDURE — 99215 OFFICE O/P EST HI 40 MIN: CPT | Performed by: INTERNAL MEDICINE

## 2021-11-30 PROCEDURE — 1101F PT FALLS ASSESS-DOCD LE1/YR: CPT | Performed by: INTERNAL MEDICINE

## 2021-11-30 PROCEDURE — G8754 DIAS BP LESS 90: HCPCS | Performed by: INTERNAL MEDICINE

## 2021-11-30 PROCEDURE — G8510 SCR DEP NEG, NO PLAN REQD: HCPCS | Performed by: INTERNAL MEDICINE

## 2021-11-30 PROCEDURE — G8427 DOCREV CUR MEDS BY ELIG CLIN: HCPCS | Performed by: INTERNAL MEDICINE

## 2021-11-30 PROCEDURE — 93005 ELECTROCARDIOGRAM TRACING: CPT | Performed by: INTERNAL MEDICINE

## 2021-11-30 PROCEDURE — G0463 HOSPITAL OUTPT CLINIC VISIT: HCPCS | Performed by: INTERNAL MEDICINE

## 2021-11-30 PROCEDURE — G8536 NO DOC ELDER MAL SCRN: HCPCS | Performed by: INTERNAL MEDICINE

## 2021-11-30 PROCEDURE — 93010 ELECTROCARDIOGRAM REPORT: CPT | Performed by: INTERNAL MEDICINE

## 2021-11-30 PROCEDURE — G8417 CALC BMI ABV UP PARAM F/U: HCPCS | Performed by: INTERNAL MEDICINE

## 2021-11-30 PROCEDURE — G8752 SYS BP LESS 140: HCPCS | Performed by: INTERNAL MEDICINE

## 2021-11-30 RX ORDER — LISINOPRIL 2.5 MG/1
2.5 TABLET ORAL DAILY
Qty: 90 TABLET | Refills: 0 | Status: SHIPPED | OUTPATIENT
Start: 2021-11-30 | End: 2022-02-21 | Stop reason: SDUPTHER

## 2021-11-30 NOTE — H&P (VIEW-ONLY)
ELECTROPHYSIOLOGY        Subjective:      Giacomo Phelps is a [de-identified] y.o. female is here for EP follow up. Pt reports shortness of breath on exertion. Denies chest pain.        Patient Active Problem List    Diagnosis Date Noted    Mixed hyperlipidemia 08/04/2021    Coronary artery disease involving native coronary artery of native heart without angina pectoris 08/04/2021    NSVT (nonsustained ventricular tachycardia) ([address] 05/20/2020    S/P placement of cardiac pacemaker 05/20/2020    S/P cardiac cath 05/20/2020    IGT (impaired glucose tolerance) 03/28/2017    Encounter for medication monitoring 09/28/2016    Environmental allergies 09/21/2011    Anxiety 03/24/2011    Hypertriglyceridemia 02/23/2010    GERD (gastroesophageal reflux disease) 02/23/2010    OA (osteoarthritis) 02/23/2010    Vitamin D deficiency 02/23/2010      Rosa M Brown MD  Past Medical History:   Diagnosis Date    AV block, 3rd degree ([address] 5/20/2020    CAD (coronary artery disease)     Cancer ([address] 07/2021    basal cell- nose    GERD (gastroesophageal reflux disease) 2/23/2010    Hypertriglyceridemia 2/23/2010    Hypovitaminosis D     Long term current use of anticoagulant therapy     ASA 81 mg    NSVT (nonsustained ventricular tachycardia) ([address] 5/20/2020    OA (osteoarthritis) 2/23/2010    Osteoarthritis     Pacemaker     S/P cardiac cath 5/20/2020    S/P placement of cardiac pacemaker 5/20/2020 5/20/2020 Medtronic dual chamber pacemaker implant     Syncope     Vitamin D deficiency 2/23/2010      Past Surgical History:   Procedure Laterality Date    HX ACL RECONSTRUCTION  1998    HX CATARACT REMOVAL  06/2018    right eye    HX HEART CATHETERIZATION      HX HEENT  12/08    cervical neck stenosis s/p cervical release    HX HERNIA REPAIR  1851    umbilical    HX HIP REPLACEMENT  6/2007    right    HX HIP REPLACEMENT  12/2007    left    HX ORTHOPAEDIC  12/2008    cervical release    HX SKIN BIOPSY  2021    nose    [facility] COLONOSCOPY FLX DX W/COLLJ SPEC WHEN PFRMD  2006    VA COLONOSCOPY FLX DX W/COLLJ SPEC WHEN PFRMD  2011    Dr. Nataly Torres INS NEW/RPLCMT PRM PM W/TRANSV ELTRD ATRIAL&VENT N/A 2020    Insert Ppm Dual performed by Rylie Garcia MD at [facility] CARDIAC CATH LAB     Allergies   Allergen Reactions    Macrobid [Nitrofurantoin Monohyd/M-Cryst] Rash    Pcn [Penicillins] Hives      Family History   Problem Relation Age of Onset    Hypertension Mother     Heart Failure Mother     Cancer Father         colon    Lung Disease Brother     Arthritis-rheumatoid Brother     Heart Disease Brother     negative for cardiac disease  Social History     Socioeconomic History    Marital status:    Tobacco Use    Smoking status: Former Smoker     Packs/day: 0.25     Years: 55.00     Pack years: 13.75     Quit date: 2019     Years since quittin.0    Smokeless tobacco: Never Used   Vaping Use    Vaping Use: Never used   Substance and Sexual Activity    Alcohol use: Not Currently     Alcohol/week: 0.0 standard drinks    Drug use: No    Sexual activity: Not Currently     Current Outpatient Medications   Medication Sig    LORazepam (ATIVAN) 1 mg tablet Take 0.5-1 Tablets by mouth every eight (8) hours as needed for Anxiety.  pantoprazole (PROTONIX) 40 mg tablet Take 1 Tab by mouth daily.  metoprolol succinate (TOPROL-XL) 25 mg XL tablet Take 1 Tab by mouth nightly.  ranolazine ER (Ranexa) 500 mg SR tablet Take 1 Tab by mouth two (2) times a day.  rosuvastatin (CRESTOR) 20 mg tablet Take 1 Tab by mouth nightly.  aspirin delayed-release 81 mg tablet Take 1 Tab by mouth daily.  cetirizine (ZYRTEC) 10 mg tablet Take 10 mg by mouth daily.  Cholecalciferol, Vitamin D3, (VITAMIN D3) 1,000 unit Cap Take 1 Cap by mouth daily. No current facility-administered medications for this visit.       Vitals:    21 1308   BP: 130/80   Pulse: 86 Resp: 16   SpO2: 97%   Weight: 186 lb (84.4 kg)   Height: 5' 1\" (1.549 m)       I have reviewed the nurses notes, vitals, problem list, allergy list, medical history, family, social history and medications. Review of Symptoms:  11 systems reviewed, negative other than as stated in the HPI      Physical Exam:      General: Well developed, in no acute distress. HEENT: Eyes - PERRL  Heart:  Normal S1/S2 negative S3 or S4. Regular, no murmur  Respiratory: Clear bilaterally x 4, no wheezing or rales  Extremities:  No edema, no cyanosis. Musculoskeletal: No clubbing  Neuro: A&Ox3, speech clear  Skin: No visible rashes or lesions. Non diaphoretic.  No visible ulcers  Vascular: 2+ pulses symmetric in all extremities  Psych - judgement intact and orientation is wnl       Cardiographics    Results for orders placed or performed during the hospital encounter of 05/19/20   EKG, 12 LEAD, INITIAL   Result Value Ref Range    Ventricular Rate 132 BPM    Atrial Rate 133 BPM    QRS Duration 144 ms    Q-T Interval 372 ms    QTC Calculation (Bezet) 551 ms    Calculated R Axis -65 degrees    Calculated T Axis 86 degrees    Diagnosis       Wide QRS tachycardia  Left axis deviation  Left bundle branch block    Confirmed by Nhung Lopez ([id number]) on 5/20/2020 9:13:03 AM           Lab Results   Component Value Date/Time    WBC 7.8 08/17/2021 11:17 AM    HGB 13.6 08/17/2021 11:17 AM    HCT 44.8 08/17/2021 11:17 AM    PLATELET 891 65/58/0480 11:17 AM    MCV 97.2 08/17/2021 11:17 AM      Lab Results   Component Value Date/Time    Sodium 143 07/26/2021 09:17 AM    Potassium 4.5 07/26/2021 09:17 AM    Chloride 104 07/26/2021 09:17 AM    CO2 26 07/26/2021 09:17 AM    Anion gap 5 05/21/2020 05:11 AM    Glucose 124 (H) 07/26/2021 09:17 AM    BUN 15 07/26/2021 09:17 AM    Creatinine 1.08 (H) 07/26/2021 09:17 AM    BUN/Creatinine ratio 14 07/26/2021 09:17 AM    GFR est AA 56 (L) 07/26/2021 09:17 AM    GFR est non-AA 49 (L) 07/26/2021 09:17 AM    Calcium 9.9 07/26/2021 09:17 AM    Bilirubin, total 0.4 07/26/2021 09:17 AM    Alk. phosphatase 104 07/26/2021 09:17 AM    Protein, total 6.7 07/26/2021 09:17 AM    Albumin 4.6 07/26/2021 09:17 AM    Globulin 3.0 05/19/2020 08:04 PM    A-G Ratio 2.2 07/26/2021 09:17 AM    ALT (SGPT) 14 07/26/2021 09:17 AM      Lab Results   Component Value Date/Time    TSH 0.196 (L) 10/02/2019 11:28 AM      Echo: 11/21  · LV: Estimated LVEF is 45 - 50%. Normal cavity size and wall thickness. Abnormal left ventricular septal motion consistent with right ventricular pacing. Mild (grade 1) left ventricular diastolic dysfunction. [facility] 5/20/20:  Left Main   The vessel was visualized by angiography. The vessel is angiographically normal.   Left Anterior Descending   Mid LAD lesion, 20% stenosed. First Diagonal Branch   1st Diag lesion, 80% stenosed. Similar to 12/19. Left Circumflex   The vessel was visualized by angiography. The vessel is angiographically normal.   Right Coronary Artery   Mid RCA lesion, 50% stenosed. Assessment:           ICD-10-CM ICD-9-CM    1. NSVT (nonsustained ventricular tachycardia) ([facility])  I47.2 427.1 AMB POC EKG ROUTINE W/ 12 LEADS, INTER & REP   2. Coronary artery disease involving native coronary artery of native heart without angina pectoris  I25.10 414.01    3. AV block, 3rd degree ([facility])  I44.2 426.0    4. LBBB (left bundle branch block)  I44.7 426.3    5. S/P placement of cardiac pacemaker  Z95.0 V45.01    6. Cardiac pacemaker in situ  Z95.0 V45.01      Orders Placed This Encounter    AMB POC EKG ROUTINE W/ 12 LEADS, INTER & REP     Order Specific Question:   Reason for Exam:     Answer:   routine        Plan:     Ms Derek Cochran is here to follow up on echo results. Echo ordered for new NSVT episode and RVP of 44%. Her Ef has declined from 55-60%(5/20) to 45-50%. She is experiencing MALCOLM on ranexa and bb. Will add low dose ACE. Schedule for repeat left heart cath.  I discussed the risks/benefits/alternatives of the procedure with the patient. Risks include (but are not limited to) bleeding, heart block, infection, cva/mi/tamponade/death. If no significant CAD, will plan on repeat echo in 3 mo after starting and titration of ACE. Addressed all patient questions and concerns at this visit. Thank you for allowing me to participate in Lucian Priest 's care. Brian Ryan NP    Patient seen and examined by me with nurse practitioner. I personally performed all components of the history, physical, and medical decision making and agree with the assessment and plan with minor modifications as noted. Pt with NSVT on pacer and drop in lvef. She is c/o of worsening solorio. She has known cad and is on anti-anginal therapy. Candidate for lhc with dr Homar Francis. Will start her on ace-inh. Will repeat echo in 3 months - if lvef still less than 50%, then will consider upgrade to biv ppm. I discussed the risks/benefits/alternatives of the procedure with the patient. Risks include (but are not limited to) bleeding, heart block, infection, cva/mi/tamponade/death. The patient understands and agrees to proceed. Thank you for this interesting consultation.       Jan Hall MD, Sebastien Duncan

## 2021-11-30 NOTE — PROGRESS NOTES
ELECTROPHYSIOLOGY        Subjective:      Tacho Garcia is a [de-identified] y.o. female is here for EP follow up. Pt reports shortness of breath on exertion. Denies chest pain.        Patient Active Problem List    Diagnosis Date Noted    Mixed hyperlipidemia 08/04/2021    Coronary artery disease involving native coronary artery of native heart without angina pectoris 08/04/2021    NSVT (nonsustained ventricular tachycardia) ([address] 05/20/2020    S/P placement of cardiac pacemaker 05/20/2020    S/P cardiac cath 05/20/2020    IGT (impaired glucose tolerance) 03/28/2017    Encounter for medication monitoring 09/28/2016    Environmental allergies 09/21/2011    Anxiety 03/24/2011    Hypertriglyceridemia 02/23/2010    GERD (gastroesophageal reflux disease) 02/23/2010    OA (osteoarthritis) 02/23/2010    Vitamin D deficiency 02/23/2010      Edmund Schwartz MD  Past Medical History:   Diagnosis Date    AV block, 3rd degree ([address] 5/20/2020    CAD (coronary artery disease)     Cancer ([address] 07/2021    basal cell- nose    GERD (gastroesophageal reflux disease) 2/23/2010    Hypertriglyceridemia 2/23/2010    Hypovitaminosis D     Long term current use of anticoagulant therapy     ASA 81 mg    NSVT (nonsustained ventricular tachycardia) ([address] 5/20/2020    OA (osteoarthritis) 2/23/2010    Osteoarthritis     Pacemaker     S/P cardiac cath 5/20/2020    S/P placement of cardiac pacemaker 5/20/2020 5/20/2020 Medtronic dual chamber pacemaker implant     Syncope     Vitamin D deficiency 2/23/2010      Past Surgical History:   Procedure Laterality Date    HX ACL RECONSTRUCTION  1998    HX CATARACT REMOVAL  06/2018    right eye    HX HEART CATHETERIZATION      HX HEENT  12/08    cervical neck stenosis s/p cervical release    HX HERNIA REPAIR  7010    umbilical    HX HIP REPLACEMENT  6/2007    right    HX HIP REPLACEMENT  12/2007    left    HX ORTHOPAEDIC  12/2008    cervical release    HX SKIN BIOPSY  2021    nose    NM COLONOSCOPY FLX DX W/COLLJ SPEC WHEN PFRMD  2006    NM COLONOSCOPY FLX DX W/COLLJ SPEC WHEN PFRMD  2011    Dr. Sharlene Wilde INS NEW/RPLCMT PRM PM W/TRANSV ELTRD ATRIAL&VENT N/A 2020    Insert Ppm Dual performed by Peyman Renee MD at [facility] CARDIAC CATH LAB     Allergies   Allergen Reactions    Macrobid [Nitrofurantoin Monohyd/M-Cryst] Rash    Pcn [Penicillins] Hives      Family History   Problem Relation Age of Onset    Hypertension Mother     Heart Failure Mother     Cancer Father         colon    Lung Disease Brother     Arthritis-rheumatoid Brother     Heart Disease Brother     negative for cardiac disease  Social History     Socioeconomic History    Marital status:    Tobacco Use    Smoking status: Former Smoker     Packs/day: 0.25     Years: 55.00     Pack years: 13.75     Quit date: 2019     Years since quittin.0    Smokeless tobacco: Never Used   Vaping Use    Vaping Use: Never used   Substance and Sexual Activity    Alcohol use: Not Currently     Alcohol/week: 0.0 standard drinks    Drug use: No    Sexual activity: Not Currently     Current Outpatient Medications   Medication Sig    LORazepam (ATIVAN) 1 mg tablet Take 0.5-1 Tablets by mouth every eight (8) hours as needed for Anxiety.  pantoprazole (PROTONIX) 40 mg tablet Take 1 Tab by mouth daily.  metoprolol succinate (TOPROL-XL) 25 mg XL tablet Take 1 Tab by mouth nightly.  ranolazine ER (Ranexa) 500 mg SR tablet Take 1 Tab by mouth two (2) times a day.  rosuvastatin (CRESTOR) 20 mg tablet Take 1 Tab by mouth nightly.  aspirin delayed-release 81 mg tablet Take 1 Tab by mouth daily.  cetirizine (ZYRTEC) 10 mg tablet Take 10 mg by mouth daily.  Cholecalciferol, Vitamin D3, (VITAMIN D3) 1,000 unit Cap Take 1 Cap by mouth daily. No current facility-administered medications for this visit.       Vitals:    21 1308   BP: 130/80   Pulse: 86 Resp: 16   SpO2: 97%   Weight: 186 lb (84.4 kg)   Height: 5' 1\" (1.549 m)       I have reviewed the nurses notes, vitals, problem list, allergy list, medical history, family, social history and medications. Review of Symptoms:  11 systems reviewed, negative other than as stated in the HPI      Physical Exam:      General: Well developed, in no acute distress. HEENT: Eyes - PERRL  Heart:  Normal S1/S2 negative S3 or S4. Regular, no murmur  Respiratory: Clear bilaterally x 4, no wheezing or rales  Extremities:  No edema, no cyanosis. Musculoskeletal: No clubbing  Neuro: A&Ox3, speech clear  Skin: No visible rashes or lesions. Non diaphoretic.  No visible ulcers  Vascular: 2+ pulses symmetric in all extremities  Psych - judgement intact and orientation is wnl       Cardiographics    Results for orders placed or performed during the hospital encounter of 05/19/20   EKG, 12 LEAD, INITIAL   Result Value Ref Range    Ventricular Rate 132 BPM    Atrial Rate 133 BPM    QRS Duration 144 ms    Q-T Interval 372 ms    QTC Calculation (Bezet) 551 ms    Calculated R Axis -65 degrees    Calculated T Axis 86 degrees    Diagnosis       Wide QRS tachycardia  Left axis deviation  Left bundle branch block    Confirmed by Benedicto Sprniger ([id number]) on 5/20/2020 9:13:03 AM           Lab Results   Component Value Date/Time    WBC 7.8 08/17/2021 11:17 AM    HGB 13.6 08/17/2021 11:17 AM    HCT 44.8 08/17/2021 11:17 AM    PLATELET 101 67/21/3297 11:17 AM    MCV 97.2 08/17/2021 11:17 AM      Lab Results   Component Value Date/Time    Sodium 143 07/26/2021 09:17 AM    Potassium 4.5 07/26/2021 09:17 AM    Chloride 104 07/26/2021 09:17 AM    CO2 26 07/26/2021 09:17 AM    Anion gap 5 05/21/2020 05:11 AM    Glucose 124 (H) 07/26/2021 09:17 AM    BUN 15 07/26/2021 09:17 AM    Creatinine 1.08 (H) 07/26/2021 09:17 AM    BUN/Creatinine ratio 14 07/26/2021 09:17 AM    GFR est AA 56 (L) 07/26/2021 09:17 AM    GFR est non-AA 49 (L) 07/26/2021 09:17 AM    Calcium 9.9 07/26/2021 09:17 AM    Bilirubin, total 0.4 07/26/2021 09:17 AM    Alk. phosphatase 104 07/26/2021 09:17 AM    Protein, total 6.7 07/26/2021 09:17 AM    Albumin 4.6 07/26/2021 09:17 AM    Globulin 3.0 05/19/2020 08:04 PM    A-G Ratio 2.2 07/26/2021 09:17 AM    ALT (SGPT) 14 07/26/2021 09:17 AM      Lab Results   Component Value Date/Time    TSH 0.196 (L) 10/02/2019 11:28 AM      Echo: 11/21  · LV: Estimated LVEF is 45 - 50%. Normal cavity size and wall thickness. Abnormal left ventricular septal motion consistent with right ventricular pacing. Mild (grade 1) left ventricular diastolic dysfunction. [facility] 5/20/20:  Left Main   The vessel was visualized by angiography. The vessel is angiographically normal.   Left Anterior Descending   Mid LAD lesion, 20% stenosed. First Diagonal Branch   1st Diag lesion, 80% stenosed. Similar to 12/19. Left Circumflex   The vessel was visualized by angiography. The vessel is angiographically normal.   Right Coronary Artery   Mid RCA lesion, 50% stenosed. Assessment:           ICD-10-CM ICD-9-CM    1. NSVT (nonsustained ventricular tachycardia) ([facility])  I47.2 427.1 AMB POC EKG ROUTINE W/ 12 LEADS, INTER & REP   2. Coronary artery disease involving native coronary artery of native heart without angina pectoris  I25.10 414.01    3. AV block, 3rd degree ([facility])  I44.2 426.0    4. LBBB (left bundle branch block)  I44.7 426.3    5. S/P placement of cardiac pacemaker  Z95.0 V45.01    6. Cardiac pacemaker in situ  Z95.0 V45.01      Orders Placed This Encounter    AMB POC EKG ROUTINE W/ 12 LEADS, INTER & REP     Order Specific Question:   Reason for Exam:     Answer:   routine        Plan:     Ms Dacia Rockwell is here to follow up on echo results. Echo ordered for new NSVT episode and RVP of 44%. Her Ef has declined from 55-60%(5/20) to 45-50%. She is experiencing MALCOLM on ranexa and bb. Will add low dose ACE. Schedule for repeat left heart cath.  I discussed the risks/benefits/alternatives of the procedure with the patient. Risks include (but are not limited to) bleeding, heart block, infection, cva/mi/tamponade/death. If no significant CAD, will plan on repeat echo in 3 mo after starting and titration of ACE. Addressed all patient questions and concerns at this visit. Thank you for allowing me to participate in Edy Enriquez 's care. Davide Marin NP    Patient seen and examined by me with nurse practitioner. I personally performed all components of the history, physical, and medical decision making and agree with the assessment and plan with minor modifications as noted. Pt with NSVT on pacer and drop in lvef. She is c/o of worsening solorio. She has known cad and is on anti-anginal therapy. Candidate for lhc with dr Dank Gaytan. Will start her on ace-inh. Will repeat echo in 3 months - if lvef still less than 50%, then will consider upgrade to biv ppm. I discussed the risks/benefits/alternatives of the procedure with the patient. Risks include (but are not limited to) bleeding, heart block, infection, cva/mi/tamponade/death. The patient understands and agrees to proceed. Thank you for this interesting consultation.       Venetta Spatz, MD, Ely Carrel

## 2021-11-30 NOTE — PROGRESS NOTES
Chief Complaint   Patient presents with    Follow-up    Results     Echocardiogram    Irregular Heart Beat     1. Have you been to the ER, urgent care clinic since your last visit? No Hospitalized since your last visit? No  2. Have you seen or consulted any other health care providers outside of the [address] [place] [address] since your last visit? Yes Dermatologist & Dentist (oral surgeon)  Include any pap smears or colon screening.  NO

## 2021-11-30 NOTE — LETTER
11/30/2021    Patient: Ezio Torres   YOB: 1940   Date of Visit: 11/30/2021     López Malone MD  [address] [place] [address]  Via In Basket    Dear López Malone MD,      Thank you for referring Ms. Scarlett Landis to [address] [place] [address] [place] for evaluation. My notes for this consultation are attached. If you have questions, please do not hesitate to call me. I look forward to following your patient along with you.       Sincerely,    Jerald Valdez MD

## 2021-12-01 LAB
ALBUMIN SERPL-MCNC: 4.6 G/DL (ref 3.7–4.7)
ALBUMIN/GLOB SERPL: 2.3 {RATIO} (ref 1.2–2.2)
ALP SERPL-CCNC: 103 IU/L (ref 44–121)
ALT SERPL-CCNC: 10 IU/L (ref 0–32)
AST SERPL-CCNC: 15 IU/L (ref 0–40)
BILIRUB SERPL-MCNC: 0.3 MG/DL (ref 0–1.2)
BUN SERPL-MCNC: 12 MG/DL (ref 8–27)
BUN/CREAT SERPL: 13 (ref 12–28)
CALCIUM SERPL-MCNC: 9.7 MG/DL (ref 8.7–10.3)
CHLORIDE SERPL-SCNC: 104 MMOL/L (ref 96–106)
CO2 SERPL-SCNC: 27 MMOL/L (ref 20–29)
CREAT SERPL-MCNC: 0.91 MG/DL (ref 0.57–1)
ERYTHROCYTE [DISTWIDTH] IN BLOOD BY AUTOMATED COUNT: 12 % (ref 11.7–15.4)
GLOBULIN SER CALC-MCNC: 2 G/DL (ref 1.5–4.5)
GLUCOSE SERPL-MCNC: 97 MG/DL (ref 65–99)
HCT VFR BLD AUTO: 42.1 % (ref 34–46.6)
HGB BLD-MCNC: 13.7 G/DL (ref 11.1–15.9)
INR PPP: 1 (ref 0.9–1.2)
MCH RBC QN AUTO: 29.8 PG (ref 26.6–33)
MCHC RBC AUTO-ENTMCNC: 32.5 G/DL (ref 31.5–35.7)
MCV RBC AUTO: 92 FL (ref 79–97)
PLATELET # BLD AUTO: 286 X10E3/UL (ref 150–450)
POTASSIUM SERPL-SCNC: 4.5 MMOL/L (ref 3.5–5.2)
PROT SERPL-MCNC: 6.6 G/DL (ref 6–8.5)
PROTHROMBIN TIME: 10.3 SEC (ref 9.1–12)
RBC # BLD AUTO: 4.59 X10E6/UL (ref 3.77–5.28)
SODIUM SERPL-SCNC: 143 MMOL/L (ref 134–144)
WBC # BLD AUTO: 8.8 X10E3/UL (ref 3.4–10.8)

## 2021-12-01 NOTE — PERIOP NOTES
Patient denied any COVID-19 signs, symptoms or possible exposure in the last 30 days. Patient is fully vaccinated & vaccination history is documented in Epic/CC.

## 2021-12-10 ENCOUNTER — HOSPITAL ENCOUNTER (OUTPATIENT)
Age: 81
Discharge: HOME | End: 2021-12-10
Attending: INTERNAL MEDICINE | Admitting: INTERNAL MEDICINE
Payer: MEDICARE

## 2021-12-10 VITALS
HEIGHT: 61 IN | HEART RATE: 74 BPM | OXYGEN SATURATION: 95 % | DIASTOLIC BLOOD PRESSURE: 43 MMHG | SYSTOLIC BLOOD PRESSURE: 108 MMHG | TEMPERATURE: 97.6 F | WEIGHT: 178 LBS | RESPIRATION RATE: 16 BRPM | BODY MASS INDEX: 33.61 KG/M2

## 2021-12-10 DIAGNOSIS — I25.10 CORONARY ARTERY DISEASE INVOLVING NATIVE CORONARY ARTERY OF NATIVE HEART WITHOUT ANGINA PECTORIS: ICD-10-CM

## 2021-12-10 DIAGNOSIS — I47.29 NSVT (NONSUSTAINED VENTRICULAR TACHYCARDIA): ICD-10-CM

## 2021-12-10 DIAGNOSIS — R07.9 CHEST PAIN, UNSPECIFIED TYPE: ICD-10-CM

## 2021-12-10 PROCEDURE — 99152 MOD SED SAME PHYS/QHP 5/>YRS: CPT | Performed by: INTERNAL MEDICINE

## 2021-12-10 PROCEDURE — 77030008543 HC TBNG MON PRSS MRTM -A: Performed by: INTERNAL MEDICINE

## 2021-12-10 PROCEDURE — 74011250636 HC RX REV CODE- 250/636: Performed by: INTERNAL MEDICINE

## 2021-12-10 PROCEDURE — 77030004549 HC CATH ANGI DX PRF MRTM -A: Performed by: INTERNAL MEDICINE

## 2021-12-10 PROCEDURE — C1894 INTRO/SHEATH, NON-LASER: HCPCS | Performed by: INTERNAL MEDICINE

## 2021-12-10 PROCEDURE — 77030010221 HC SPLNT WR POS TELE -B: Performed by: INTERNAL MEDICINE

## 2021-12-10 PROCEDURE — 74011000250 HC RX REV CODE- 250: Performed by: INTERNAL MEDICINE

## 2021-12-10 PROCEDURE — 93458 L HRT ARTERY/VENTRICLE ANGIO: CPT | Performed by: INTERNAL MEDICINE

## 2021-12-10 PROCEDURE — C1769 GUIDE WIRE: HCPCS | Performed by: INTERNAL MEDICINE

## 2021-12-10 PROCEDURE — 77030042317 HC BND COMPR HEMSTAT -B: Performed by: INTERNAL MEDICINE

## 2021-12-10 PROCEDURE — 74011000636 HC RX REV CODE- 636: Performed by: INTERNAL MEDICINE

## 2021-12-10 PROCEDURE — 77030030195 HC CATH ANGI DX PRF4 MRTM -A: Performed by: INTERNAL MEDICINE

## 2021-12-10 PROCEDURE — 99153 MOD SED SAME PHYS/QHP EA: CPT | Performed by: INTERNAL MEDICINE

## 2021-12-10 PROCEDURE — 77030015766: Performed by: INTERNAL MEDICINE

## 2021-12-10 RX ORDER — VERAPAMIL HYDROCHLORIDE 2.5 MG/ML
INJECTION, SOLUTION INTRAVENOUS AS NEEDED
Status: DISCONTINUED | OUTPATIENT
Start: 2021-12-10 | End: 2021-12-10 | Stop reason: HOSPADM

## 2021-12-10 RX ORDER — MIDAZOLAM HYDROCHLORIDE 1 MG/ML
INJECTION, SOLUTION INTRAMUSCULAR; INTRAVENOUS AS NEEDED
Status: DISCONTINUED | OUTPATIENT
Start: 2021-12-10 | End: 2021-12-10 | Stop reason: HOSPADM

## 2021-12-10 RX ORDER — HEPARIN SODIUM 200 [USP'U]/100ML
INJECTION, SOLUTION INTRAVENOUS
Status: COMPLETED | OUTPATIENT
Start: 2021-12-10 | End: 2021-12-10

## 2021-12-10 RX ORDER — HEPARIN SODIUM 1000 [USP'U]/ML
INJECTION, SOLUTION INTRAVENOUS; SUBCUTANEOUS AS NEEDED
Status: DISCONTINUED | OUTPATIENT
Start: 2021-12-10 | End: 2021-12-10 | Stop reason: HOSPADM

## 2021-12-10 RX ORDER — FENTANYL CITRATE 50 UG/ML
INJECTION, SOLUTION INTRAMUSCULAR; INTRAVENOUS AS NEEDED
Status: DISCONTINUED | OUTPATIENT
Start: 2021-12-10 | End: 2021-12-10 | Stop reason: HOSPADM

## 2021-12-10 RX ORDER — LIDOCAINE HYDROCHLORIDE 10 MG/ML
INJECTION, SOLUTION EPIDURAL; INFILTRATION; INTRACAUDAL; PERINEURAL AS NEEDED
Status: DISCONTINUED | OUTPATIENT
Start: 2021-12-10 | End: 2021-12-10 | Stop reason: HOSPADM

## 2021-12-10 NOTE — CARDIO/PULMONARY
Cardiac Rehab Note: chart review/referral     Cardiac cath without intervention 12/10/21    Smoking history assessed. Patient is a former smoker. Smoking Cessation Program link has not been added to the AVS.     Patient not seen at this time due to current condition as indicated in chart.

## 2021-12-10 NOTE — INTERVAL H&P NOTE
Update History & Physical    The Patient's History and Physical of November 30, 2021 was reviewed with the patient and I examined the patient. There was no change. The surgical site was confirmed by the patient and me. Plan:  The risk, benefits, expected outcome, and alternative to the recommended procedure have been discussed with the patient. Patient understands and wants to proceed with the procedure.     Electronically signed by Miriam [place] MD on 12/10/2021 at 12:18 PM

## 2021-12-10 NOTE — DISCHARGE INSTRUCTIONS
[address] [place] [address], [address] [facility]  [phone]        Patient ID:  Chevy Medina  [id number]  34 y.o.  1940    Admit Date: 12/10/2021    Discharge Date: 12/10/2021     Admitting Physician: Elli Nolasco MD     Discharge Physician: Elli Nolasco MD    Admission Diagnoses:   Chest pain, unspecified type [R07.9]    Discharge Diagnoses: Active Problems:    NSVT (nonsustained ventricular tachycardia) ([facility]) (5/20/2020)      S/P placement of cardiac pacemaker (5/20/2020)      Overview: 11/2021 echo EF 45-50%, and abnormal LV septal motion consistent with       right ventricular pacing.            5/20/2020 Medtronic dual chamber pacemaker implant            S/P cardiac cath (5/20/2020)      Overview: 12/10/2021 cardiac cath with nonobstructive disease      Mixed hyperlipidemia (8/4/2021)        Discharge Condition: Good    Cardiology Procedures this Admission:  Diagnostic left heart catheterization    Disposition: home    Reference discharge instructions provided by nursing for diet and activity. Signed:  Lissy Spencer NP  12/10/2021  2:44 PM        Radial Cardiac Catheterization/Angiography Discharge Instructions    It is normal to feel tired the first couple days. Take it easy and follow the physicians instructions. CHECK THE CATHETER INSERTION SITE DAILY:    Remove the wrist dressing 24 hours after the procedure. You may shower 24 hours after the procedure. Wash with soap and water and pat dry. Gentle cleaning of the site with soap and water is sufficient, cover with a dry clean dressing or bandage. Do not apply creams or powders to the area. No soaking the wrist for 3 days. Leave the puncture site open to air after 24 hours post-procedure. CALL THE PHYSICIANS:     If the site becomes red, swollen or feels warm to the touch  If there is bleeding or drainage or if there is unusual pain at the radial site.      If there is any minor oozing, you may apply a band-aid and remove after 12 hours. If the bleeding continues, hold pressure with the middle finger against the puncture site and the thumb against the back of the wrist,call 911 to be transported to the hospital.  DO NOT DRIVE YOURSELF, [phone]. ACTIVITY:   For the first 24 hours do not manipulate the wrist.  No lifting, pushing or pulling over 3-5 pounds with the affected wrist for 7 daysand no straining the insertion site. Do not life grocery bags or the garbage can, do not run the vacuum  or  for 7 days. Start with short walks as in the hospital and gradually increase as tolerated each day. It is recommended to walk 30 minutes 5-7 days per week. Follow your physicians instructions on activity. Avoid walking outside in extremes of heat or cold. Walk inside when it is cold and windy or hot and humid. Things to keep in mind:  No driving for at least 24 hours, or as designated by your physician. Limit the number of times you go up and down the stairs  Take rests and pace yourself with activity. Be careful and do not strain with bowel movements. MEDICATIONS:    Take all medications as prescribed  Call your physician if you have any questions  Keep an updated list of your medications with you at all times and give a list to your physician and pharmacist    SIGNS AND SYMPTOMS:   Be cautious of symptoms of angina or recurrent symptoms such as chest discomfort, unusual shortness of breath or fatigue. These could be symptoms of restenosis, a new blockage or a heart attack. If your symptoms are relieved with rest it is still recommended that you notify your physician of recurrent chest pain or discomfort.   For CHEST PAIN or symptoms of angina not relieved with rest:  If the discomfort is not relieved with rest, and you have been prescribed Nitroglycerin, take as directed (taken under the tongue, one at a time 5 minutes apart for a total of 3 doses). If the discomfort is not relieved after the 3rd nitroglycerin, call 911. If you have not been prescribed Nitroglycerin  and your chest discomfort is not relieved with rest, call 911. AFTER CARE:   Follow up with your physician as instructed. Follow a heart healthy diet with proper portion control, daily stress management, daily exercise, blood pressure and cholesterol control , and smoking cessation.

## 2021-12-10 NOTE — PROGRESS NOTES
Ms. Denver Schooner had elective cardiac cath for evaluation of NSVT and newly lower EF 45-50%  Cardiac cath neg, no obstructive disease    Plan for discharge home. Continue on current medications. Recently started on ACEI    F/u with Dr. Morse Fruits 2 weeks.

## 2021-12-10 NOTE — Clinical Note
Mallampati: Class II - soft palate, uvula, fauces visible. ASA: Class 2 - patient with mild systemic disease. no

## 2021-12-10 NOTE — Clinical Note
TRANSFER - OUT REPORT:     Verbal report given to: meenu montes. Report consisted of patient's Situation, Background, Assessment and   Recommendations(SBAR). Opportunity for questions and clarification was provided.

## 2021-12-10 NOTE — PROGRESS NOTES
Cardiac Cath Lab Recovery Arrival Note:      Faizan Green arrived to Cardiac Cath Lab, Recovery Area. Staff introduced to patient. Patient identifiers verified with NAME and DATE OF BIRTH. Procedure verified with patient. Consent forms reviewed and signed by patient or authorized representative and verified. Allergies verified. Patient and family oriented to department. Patient and family informed of procedure and plan of care. Questions answered with review. Patient prepped for procedure, per orders from physician, prior to arrival.    Patient on cardiac monitor, non-invasive blood pressure, SPO2 monitor. On RA. Patient is A&Ox 4. Patient reports no complaints. Patient in stretcher, in low position, with side rails up, call bell within reach, patient instructed to call if assistance as needed. Patient prep in: [address], [place] [address]. Patient family has pager #   Family in: waiting room.    Prep by: Earnestine Dance, RN's

## 2021-12-22 RX ORDER — RANOLAZINE 500 MG/1
500 TABLET, EXTENDED RELEASE ORAL 2 TIMES DAILY
Qty: 180 TABLET | Refills: 1 | Status: SHIPPED | OUTPATIENT
Start: 2021-12-22

## 2021-12-22 RX ORDER — ROSUVASTATIN CALCIUM 20 MG/1
20 TABLET, COATED ORAL
Qty: 90 TABLET | Refills: 1 | Status: SHIPPED | OUTPATIENT
Start: 2021-12-22

## 2021-12-22 NOTE — TELEPHONE ENCOUNTER
Refill Request Received for the Following Medication     Requested Prescriptions     Pending Prescriptions Disp Refills    ranolazine ER (Ranexa) 500 mg SR tablet 180 Tablet 3     Sig: Take 1 Tablet by mouth two (2) times a day.  rosuvastatin (CRESTOR) 20 mg tablet 90 Tablet 3     Sig: Take 1 Tablet by mouth nightly.        Last Prescribed:    Last Appointment With Me:  8/5/2021     Future Appointments:  Future Appointments   Date Time Provider Rhonda Vza   1/10/2022 11:45 AM REMOTE_KIM BUITRAGOMB BS AMB   1/14/2022  9:30 AM Ky Kirk MD [facility] BS AMB   2/10/2022  2:45 PM Ky Kirk MD [facility] BS AMB   2/18/2022  9:20 AM Michael Jasso MD [facility] BS AMB   10/26/2022 10:45 AM PACEMAKER, KIM GUEVARA BS AMB   10/26/2022 11:00 AM Katina Malave, ANP [facility] BS AMB

## 2021-12-22 NOTE — TELEPHONE ENCOUNTER
Pt called regarding needing a refill for ranolazine (ranexa) 500 mg and rosuvastatin (crestor) 20 mg. She states her pharmacy will send a refill request. The pharmacy is [address]     Callback is [phone]    Thanks  Shayna Barclay

## 2022-01-10 ENCOUNTER — OFFICE VISIT (OUTPATIENT)
Dept: CARDIOLOGY CLINIC | Age: 82
End: 2022-01-10
Payer: MEDICARE

## 2022-01-10 DIAGNOSIS — I44.2 AV BLOCK, 3RD DEGREE (HCC): ICD-10-CM

## 2022-01-10 DIAGNOSIS — Z95.0 CARDIAC PACEMAKER IN SITU: Primary | ICD-10-CM

## 2022-01-10 DIAGNOSIS — I49.5 SICK SINUS SYNDROME (HCC): ICD-10-CM

## 2022-01-10 PROCEDURE — 93294 REM INTERROG EVL PM/LDLS PM: CPT | Performed by: INTERNAL MEDICINE

## 2022-01-10 PROCEDURE — 93296 REM INTERROG EVL PM/IDS: CPT | Performed by: INTERNAL MEDICINE

## 2022-01-11 DIAGNOSIS — Z95.0 PACEMAKER: ICD-10-CM

## 2022-01-11 DIAGNOSIS — I49.5 SICK SINUS SYNDROME (HCC): Primary | ICD-10-CM

## 2022-01-11 DIAGNOSIS — I25.10 CORONARY ARTERY DISEASE INVOLVING NATIVE CORONARY ARTERY OF NATIVE HEART WITHOUT ANGINA PECTORIS: ICD-10-CM

## 2022-01-11 DIAGNOSIS — I10 ESSENTIAL HYPERTENSION: ICD-10-CM

## 2022-01-14 ENCOUNTER — OFFICE VISIT (OUTPATIENT)
Dept: CARDIOLOGY CLINIC | Age: 82
End: 2022-01-14
Payer: MEDICARE

## 2022-01-14 VITALS
DIASTOLIC BLOOD PRESSURE: 78 MMHG | HEIGHT: 61 IN | WEIGHT: 181.6 LBS | SYSTOLIC BLOOD PRESSURE: 138 MMHG | OXYGEN SATURATION: 99 % | BODY MASS INDEX: 34.29 KG/M2 | RESPIRATION RATE: 18 BRPM | HEART RATE: 70 BPM

## 2022-01-14 DIAGNOSIS — I25.10 CORONARY ARTERY DISEASE INVOLVING NATIVE CORONARY ARTERY OF NATIVE HEART WITHOUT ANGINA PECTORIS: Primary | ICD-10-CM

## 2022-01-14 DIAGNOSIS — Z95.0 S/P PLACEMENT OF CARDIAC PACEMAKER: ICD-10-CM

## 2022-01-14 DIAGNOSIS — E78.2 MIXED HYPERLIPIDEMIA: ICD-10-CM

## 2022-01-14 PROBLEM — I47.29 NSVT (NONSUSTAINED VENTRICULAR TACHYCARDIA): Status: RESOLVED | Noted: 2020-05-20 | Resolved: 2022-01-14

## 2022-01-14 PROCEDURE — 1090F PRES/ABSN URINE INCON ASSESS: CPT | Performed by: INTERNAL MEDICINE

## 2022-01-14 PROCEDURE — 93005 ELECTROCARDIOGRAM TRACING: CPT | Performed by: INTERNAL MEDICINE

## 2022-01-14 PROCEDURE — G8536 NO DOC ELDER MAL SCRN: HCPCS | Performed by: INTERNAL MEDICINE

## 2022-01-14 PROCEDURE — G8399 PT W/DXA RESULTS DOCUMENT: HCPCS | Performed by: INTERNAL MEDICINE

## 2022-01-14 PROCEDURE — G8752 SYS BP LESS 140: HCPCS | Performed by: INTERNAL MEDICINE

## 2022-01-14 PROCEDURE — G8754 DIAS BP LESS 90: HCPCS | Performed by: INTERNAL MEDICINE

## 2022-01-14 PROCEDURE — 93010 ELECTROCARDIOGRAM REPORT: CPT | Performed by: INTERNAL MEDICINE

## 2022-01-14 PROCEDURE — G8427 DOCREV CUR MEDS BY ELIG CLIN: HCPCS | Performed by: INTERNAL MEDICINE

## 2022-01-14 PROCEDURE — 1101F PT FALLS ASSESS-DOCD LE1/YR: CPT | Performed by: INTERNAL MEDICINE

## 2022-01-14 PROCEDURE — 99213 OFFICE O/P EST LOW 20 MIN: CPT | Performed by: INTERNAL MEDICINE

## 2022-01-14 PROCEDURE — G8510 SCR DEP NEG, NO PLAN REQD: HCPCS | Performed by: INTERNAL MEDICINE

## 2022-01-14 PROCEDURE — G0463 HOSPITAL OUTPT CLINIC VISIT: HCPCS | Performed by: INTERNAL MEDICINE

## 2022-01-14 PROCEDURE — G8417 CALC BMI ABV UP PARAM F/U: HCPCS | Performed by: INTERNAL MEDICINE

## 2022-01-14 NOTE — LETTER
1/14/2022    Patient: Odilon Luevano   YOB: 1940   Date of Visit: 1/14/2022     Antwan Mazariegos MD  [address] [place] [address]  Via In Basket    Dear Antwan Mazariegos MD,      Thank you for referring Ms. Keeley Smith to [address] [place] [address] for evaluation. My notes for this consultation are attached. If you have questions, please do not hesitate to call me. I look forward to following your patient along with you.       Sincerely,    Minor Snellen, MD

## 2022-01-14 NOTE — PROGRESS NOTES
Chief Complaint   Patient presents with    Post OP Follow Up     Cardiac Cath Dec 10, 2021-SOB on exertion    Cholesterol Problem    Heart Problem     1. Have you been to the ER, urgent care clinic since your last visit? Hospitalized since your last visit? Yes, [address] [facility] Dec 10, 2021    2. Have you seen or consulted any other health care providers outside of the [address] [place] [address] since your last visit? Include any pap smears or colon screening.  No

## 2022-01-14 NOTE — PROGRESS NOTES
Vijaya Ovalle, [facility]    Subjective/HPI:     Gerson Mark is a [de-identified] y.o. female is here for routine f/u. She has a PMHx of non-obstructive CAD, AV block s/p PPM, NSVT, and HLD. Since last visit, had cardiac cath for new NSVT episode noted on device check with reduced LVEF 45-50% on echo, with SOB symptoms (chronic, unchanged). Cardiac cath with mod, non-obstructive CAD, unchanged from previous study in 5/2020. Current Outpatient Medications on File Prior to Visit   Medication Sig Dispense Refill    ranolazine ER (Ranexa) 500 mg SR tablet Take 1 Tablet by mouth two (2) times a day. 180 Tablet 1    rosuvastatin (CRESTOR) 20 mg tablet Take 1 Tablet by mouth nightly. 90 Tablet 1    lisinopriL (PRINIVIL, ZESTRIL) 2.5 mg tablet Take 1 Tablet by mouth daily. 90 Tablet 0    LORazepam (ATIVAN) 1 mg tablet Take 0.5-1 Tablets by mouth every eight (8) hours as needed for Anxiety. 90 Tablet 1    pantoprazole (PROTONIX) 40 mg tablet Take 1 Tab by mouth daily. 90 Tab 3    metoprolol succinate (TOPROL-XL) 25 mg XL tablet Take 1 Tab by mouth nightly. 90 Tab 3    aspirin delayed-release 81 mg tablet Take 1 Tab by mouth daily. 30 Tab 5    cetirizine (ZYRTEC) 10 mg tablet Take 10 mg by mouth daily.  Cholecalciferol, Vitamin D3, (VITAMIN D3) 1,000 unit Cap Take 1 Cap by mouth daily. No current facility-administered medications on file prior to visit. Review of Symptoms:    Review of Systems   Constitutional: Negative for chills, fever and weight loss. HENT: Negative for nosebleeds. Eyes: Negative for blurred vision and double vision. Respiratory: Positive for shortness of breath (unchanged). Negative for cough and wheezing. Cardiovascular: Negative for chest pain, palpitations, orthopnea, leg swelling and PND. Skin: Negative for rash. Neurological: Negative for dizziness and loss of consciousness.        Physical Exam:      General: Well developed, in no acute distress, cooperative and alert  Heart:  reg rate and rhythm; normal S1/S2; no murmurs, no gallops or rubs. Respiratory: Clear bilaterally x 4, no wheezing or rales  Extremities:  Normal cap refill, no cyanosis, atraumatic. No edema. Vascular: 2+ pulses symmetric in all extremities    Vitals:    01/14/22 0946 01/14/22 1005   BP: (!) 142/84 138/78   BP 1 Location: Left upper arm Left upper arm   BP Patient Position: Sitting Sitting   BP Cuff Size: Large adult Large adult   Pulse: 70    Resp: 18    Height: 5' 1\" (1.549 m)    Weight: 181 lb 9.6 oz (82.4 kg)    SpO2: 99%        ECG done today shows V-paced rhythm     Assessment:       ICD-10-CM ICD-9-CM    1. Coronary artery disease involving native coronary artery of native heart without angina pectoris  I25.10 414.01    2. Mixed hyperlipidemia  E78.2 272.2 AMB POC EKG ROUTINE W/ 12 LEADS, INTER & REP   3. S/P placement of cardiac pacemaker  Z95.0 V45.01         Plan:     1. Coronary artery disease involving native coronary artery of native heart without angina pectoris  Cardiac cath done 12/2021 with mod CAD, unchanged from 5/2020  Without anginal or anginal equivalent symptoms  Continue BB, ASA, statin therapy    2. NSVT (nonsustained ventricular tachycardia) (HCC)  1 new NSVT episode on recent device check, cath with unchanged coronaries  Keep fu with Dr. Sal Beck BB therapy    3. Non-ischemic cardiomyopathy  Echo done 11/2021 with newly reduced LVEF 45-50%  Cardiac cath with mod, non-obstructive disease, unchanged from 5/2020  Continue lisinopril, metoprolol  Repeat echo pending 3/2022    4. Mixed hyperlipidemia  LDL 68 in 7/2021  Continue statin therapy and low fat, low cholesterol diet  Lipids managed by PCP    5. S/P placement of cardiac pacemaker  Continue with routine device interrogation with Dr. Raymond Carmona    F/u with Dr. Laney Glez in 6 months    Gabriel Fernandez NP       [facility]             Patient seen, examined by me personally. Plan discussed as detailed. Agree with note as outlined by  NP with modifications as noted. My independent physical exam reveals : Physical Exam  Vitals and nursing note reviewed. Cardiovascular:      Rate and Rhythm: Normal rate and regular rhythm. Heart sounds: Normal heart sounds. Pulmonary:      Breath sounds: Normal breath sounds. Musculoskeletal:      Right lower leg: No edema. Left lower leg: No edema. Neurological:      Mental Status: She is alert and oriented to person, place, and time. Psychiatric:         Mood and Affect: Mood normal.          No additional findings noted. Agree with plan as outlined above with modifications as noted.      Polina Mahoney MD

## 2022-02-18 ENCOUNTER — OFFICE VISIT (OUTPATIENT)
Dept: FAMILY MEDICINE CLINIC | Age: 82
End: 2022-02-18
Payer: MEDICARE

## 2022-02-18 VITALS
BODY MASS INDEX: 34.06 KG/M2 | WEIGHT: 180.4 LBS | DIASTOLIC BLOOD PRESSURE: 67 MMHG | SYSTOLIC BLOOD PRESSURE: 136 MMHG | HEART RATE: 97 BPM | TEMPERATURE: 98 F | OXYGEN SATURATION: 97 % | RESPIRATION RATE: 18 BRPM | HEIGHT: 61 IN

## 2022-02-18 DIAGNOSIS — E55.9 HYPOVITAMINOSIS D: ICD-10-CM

## 2022-02-18 DIAGNOSIS — E78.2 MIXED HYPERLIPIDEMIA: Primary | ICD-10-CM

## 2022-02-18 DIAGNOSIS — Z91.09 ENVIRONMENTAL ALLERGIES: ICD-10-CM

## 2022-02-18 DIAGNOSIS — K21.9 GASTROESOPHAGEAL REFLUX DISEASE WITHOUT ESOPHAGITIS: ICD-10-CM

## 2022-02-18 DIAGNOSIS — Z51.81 ENCOUNTER FOR MEDICATION MONITORING: ICD-10-CM

## 2022-02-18 DIAGNOSIS — I25.10 CORONARY ARTERY DISEASE INVOLVING NATIVE CORONARY ARTERY OF NATIVE HEART WITHOUT ANGINA PECTORIS: ICD-10-CM

## 2022-02-18 DIAGNOSIS — M15.9 PRIMARY OSTEOARTHRITIS INVOLVING MULTIPLE JOINTS: ICD-10-CM

## 2022-02-18 DIAGNOSIS — F41.9 ANXIETY: ICD-10-CM

## 2022-02-18 DIAGNOSIS — Z95.0 CARDIAC PACEMAKER IN SITU: ICD-10-CM

## 2022-02-18 DIAGNOSIS — I49.5 SSS (SICK SINUS SYNDROME) (HCC): ICD-10-CM

## 2022-02-18 DIAGNOSIS — R73.02 IGT (IMPAIRED GLUCOSE TOLERANCE): ICD-10-CM

## 2022-02-18 LAB
25(OH)D3 SERPL-MCNC: 30.5 NG/ML (ref 30–100)
ALBUMIN SERPL-MCNC: 3.8 G/DL (ref 3.5–5)
ALBUMIN/GLOB SERPL: 1.4 {RATIO} (ref 1.1–2.2)
ALP SERPL-CCNC: 93 U/L (ref 45–117)
ALT SERPL-CCNC: 16 U/L (ref 12–78)
ANION GAP SERPL CALC-SCNC: 5 MMOL/L (ref 5–15)
AST SERPL-CCNC: 10 U/L (ref 15–37)
BILIRUB SERPL-MCNC: 0.5 MG/DL (ref 0.2–1)
BUN SERPL-MCNC: 17 MG/DL (ref 6–20)
BUN/CREAT SERPL: 16 (ref 12–20)
CALCIUM SERPL-MCNC: 9.7 MG/DL (ref 8.5–10.1)
CHLORIDE SERPL-SCNC: 109 MMOL/L (ref 97–108)
CHOLEST SERPL-MCNC: 162 MG/DL
CO2 SERPL-SCNC: 23 MMOL/L (ref 21–32)
CREAT SERPL-MCNC: 1.05 MG/DL (ref 0.55–1.02)
GLOBULIN SER CALC-MCNC: 2.7 G/DL (ref 2–4)
GLUCOSE POC: 128 MG/DL
GLUCOSE SERPL-MCNC: 124 MG/DL (ref 65–100)
HBA1C MFR BLD HPLC: 6 %
HDLC SERPL-MCNC: 49 MG/DL
HDLC SERPL: 3.3 {RATIO} (ref 0–5)
LDLC SERPL CALC-MCNC: 56.8 MG/DL (ref 0–100)
POTASSIUM SERPL-SCNC: 4.5 MMOL/L (ref 3.5–5.1)
PROT SERPL-MCNC: 6.5 G/DL (ref 6.4–8.2)
SODIUM SERPL-SCNC: 137 MMOL/L (ref 136–145)
TRIGL SERPL-MCNC: 281 MG/DL (ref ?–150)
VLDLC SERPL CALC-MCNC: 56.2 MG/DL

## 2022-02-18 PROCEDURE — G0463 HOSPITAL OUTPT CLINIC VISIT: HCPCS | Performed by: FAMILY MEDICINE

## 2022-02-18 PROCEDURE — G8536 NO DOC ELDER MAL SCRN: HCPCS | Performed by: FAMILY MEDICINE

## 2022-02-18 PROCEDURE — G8754 DIAS BP LESS 90: HCPCS | Performed by: FAMILY MEDICINE

## 2022-02-18 PROCEDURE — 99214 OFFICE O/P EST MOD 30 MIN: CPT | Performed by: FAMILY MEDICINE

## 2022-02-18 PROCEDURE — G8752 SYS BP LESS 140: HCPCS | Performed by: FAMILY MEDICINE

## 2022-02-18 PROCEDURE — 83036 HEMOGLOBIN GLYCOSYLATED A1C: CPT | Performed by: FAMILY MEDICINE

## 2022-02-18 PROCEDURE — 82947 ASSAY GLUCOSE BLOOD QUANT: CPT | Performed by: FAMILY MEDICINE

## 2022-02-18 PROCEDURE — G8427 DOCREV CUR MEDS BY ELIG CLIN: HCPCS | Performed by: FAMILY MEDICINE

## 2022-02-18 PROCEDURE — 1101F PT FALLS ASSESS-DOCD LE1/YR: CPT | Performed by: FAMILY MEDICINE

## 2022-02-18 PROCEDURE — 1090F PRES/ABSN URINE INCON ASSESS: CPT | Performed by: FAMILY MEDICINE

## 2022-02-18 PROCEDURE — G8417 CALC BMI ABV UP PARAM F/U: HCPCS | Performed by: FAMILY MEDICINE

## 2022-02-18 PROCEDURE — G8399 PT W/DXA RESULTS DOCUMENT: HCPCS | Performed by: FAMILY MEDICINE

## 2022-02-18 PROCEDURE — G8510 SCR DEP NEG, NO PLAN REQD: HCPCS | Performed by: FAMILY MEDICINE

## 2022-02-18 RX ORDER — PROMETHAZINE HYDROCHLORIDE AND CODEINE PHOSPHATE 6.25; 1 MG/5ML; MG/5ML
1 SOLUTION ORAL
Qty: 180 ML | Refills: 0 | Status: SHIPPED | OUTPATIENT
Start: 2022-02-18 | End: 2022-04-04

## 2022-02-18 RX ORDER — PROMETHAZINE HYDROCHLORIDE AND CODEINE PHOSPHATE 6.25; 1 MG/5ML; MG/5ML
1 SOLUTION ORAL
Qty: 180 ML | Refills: 0 | Status: CANCELLED | OUTPATIENT
Start: 2022-02-18 | End: 2022-05-28

## 2022-02-18 RX ORDER — PANTOPRAZOLE SODIUM 40 MG/1
40 TABLET, DELAYED RELEASE ORAL DAILY
Qty: 90 TABLET | Refills: 3 | Status: SHIPPED | OUTPATIENT
Start: 2022-02-18

## 2022-02-18 RX ORDER — LORAZEPAM 1 MG/1
.5-1 TABLET ORAL
Qty: 90 TABLET | Refills: 1 | Status: SHIPPED | OUTPATIENT
Start: 2022-02-18 | End: 2022-09-26 | Stop reason: SDUPTHER

## 2022-02-18 NOTE — PROGRESS NOTES
HISTORY OF PRESENT ILLNESS  Montrell Bautista is a [de-identified] y.o. female. HPI   Follow up on chronic medical problems. Cardiovascular Review:  The patient has coronary artery disease and hyperlipidemia. She was started on ranexa and crestor. Cardiologist is Dr. Heber Hauser. Had cardiac catheterization was 12/2019 and was decided to medically treat the proximal RCA and distal second DIAG lesion. Overall doing well. Had episode of SSS/CHB in May 2020 and had PM placement. Had cardiac Cath at that time. Similar CAD as prior cath 12/2019. · Repeat cardiac Cath 12/2021 d/t increased SOB:  · Moderate CAD. no significant change from 5/20. · Mild LV dysfunction. Diet and Lifestyle: generally follows a low fat low cholesterol diet, generally follows a low sodium diet, exercises sporadically, we discussed increasing her exercise regimen. Home BP Monitoring: is not measured at home. Pertinent ROS: taking medications as instructed, no medication side effects noted, no TIA's, no chest pain on exertion, no dyspnea on exertion, no swelling of ankles, no palpitations. Hypertriglyceridemia follow up:  Compliant w/ meds, low fat, low cholesterol diet. She is on crestor. Triglyceride are still slight elevated. Discussed adding trico is numbers. Tolerating well. Exercising some. No muscle nor abdominal pain, no skin discoloration. Osteoarthritis:  Patient has osteoarthritis. Has various joint aches but overall doing well. Symptoms onset: problem is longstanding. Rheumatological ROS: stable, mild-to-moderate joint symptoms intermittently, reasonably well controlled by PRN meds. Response to treatment plan: stable and intermittent. Glucose intolerance reveiw:  She has IGT. Diabetic ROS - further diabetic ROS: no polyuria or polydipsia, no chest pain, dyspnea or TIA's, no numbness, tingling or pain in extremities, no unusual visual symptoms.    Lab review: orders written for new lab studies as appropriate; see orders. Patient Active Problem List   Diagnosis Code    Hypertriglyceridemia E78.1    GERD (gastroesophageal reflux disease) K21.9    OA (osteoarthritis) M19.90    Vitamin D deficiency E55.9    Anxiety F41.9    Environmental allergies Z91.09    Encounter for medication monitoring Z51.81    IGT (impaired glucose tolerance) R73.02    S/P placement of cardiac pacemaker Z95.0    S/P cardiac cath Z98.890    Mixed hyperlipidemia E78.2    Coronary artery disease involving native coronary artery of native heart without angina pectoris I25.10       Current Outpatient Medications   Medication Sig Dispense Refill    ranolazine ER (Ranexa) 500 mg SR tablet Take 1 Tablet by mouth two (2) times a day. 180 Tablet 1    rosuvastatin (CRESTOR) 20 mg tablet Take 1 Tablet by mouth nightly. 90 Tablet 1    lisinopriL (PRINIVIL, ZESTRIL) 2.5 mg tablet Take 1 Tablet by mouth daily. 90 Tablet 0    LORazepam (ATIVAN) 1 mg tablet Take 0.5-1 Tablets by mouth every eight (8) hours as needed for Anxiety. 90 Tablet 1    pantoprazole (PROTONIX) 40 mg tablet Take 1 Tab by mouth daily. 90 Tab 3    metoprolol succinate (TOPROL-XL) 25 mg XL tablet Take 1 Tab by mouth nightly. 90 Tab 3    aspirin delayed-release 81 mg tablet Take 1 Tab by mouth daily. 30 Tab 5    cetirizine (ZYRTEC) 10 mg tablet Take 10 mg by mouth daily.  Cholecalciferol, Vitamin D3, (VITAMIN D3) 1,000 unit Cap Take 1 Cap by mouth daily.            Allergies   Allergen Reactions    Macrobid [Nitrofurantoin Monohyd/M-Cryst] Rash    Pcn [Penicillins] Hives         Past Medical History:   Diagnosis Date    AV block, 3rd degree ([address] 5/20/2020    CAD (coronary artery disease)     Cancer ([address] 07/2021    basal cell- nose    GERD (gastroesophageal reflux disease) 2/23/2010    Hypertriglyceridemia 2/23/2010    Hypovitaminosis D     Long term current use of anticoagulant therapy     ASA 81 mg    NSVT (nonsustained ventricular tachycardia) ([address] 2020    OA (osteoarthritis) 2010    Osteoarthritis     Pacemaker     S/P cardiac cath 2020    S/P placement of cardiac pacemaker 2020 Medtronic dual chamber pacemaker implant     Syncope     Vitamin D deficiency 2010         Past Surgical History:   Procedure Laterality Date    HX ACL RECONSTRUCTION  1998    HX CATARACT REMOVAL  2018    right eye    HX HEART CATHETERIZATION      HX HEENT      cervical neck stenosis s/p cervical release    HX HERNIA REPAIR  4248    umbilical    HX HIP REPLACEMENT  2007    right    HX HIP REPLACEMENT  2007    left    HX ORTHOPAEDIC  2008    cervical release    HX SKIN BIOPSY  2021    nose    W[place] COLONOSCOPY FLX DX W/COLLJ SPEC WHEN PFRMD  2006    [place] COLONOSCOPY FLX DX W/COLLJ SPEC WHEN PFRMD  2011    Dr. Lilibeth Pineda INS NEW/RPLCMT PRM PM W/TRANSV ELTRD ATRIAL&VENT N/A 2020    Insert Ppm Dual performed by Gema Corcoran MD at [facility] CARDIAC CATH LAB         Family History   Problem Relation Age of Onset    Hypertension Mother     Heart Failure Mother     Cancer Father         colon    Lung Disease Brother     Arthritis-rheumatoid Brother     Heart Disease Brother        Social History     Tobacco Use    Smoking status: Former Smoker     Packs/day: 0.25     Years: 55.00     Pack years: 13.75     Quit date: 2019     Years since quittin.2    Smokeless tobacco: Never Used   Substance Use Topics    Alcohol use: Not Currently     Alcohol/week: 0.0 standard drinks        Lab Results   Component Value Date/Time    WBC 8.8 2021 02:13 PM    HGB 13.7 2021 02:13 PM    HCT 42.1 2021 02:13 PM    PLATELET 203  02:13 PM    MCV 92 2021 02:13 PM     Lab Results   Component Value Date/Time    Cholesterol, total 157 2021 09:17 AM    HDL Cholesterol 47 2021 09:17 AM    LDL, calculated 68 2021 09:17 AM    LDL, calculated 70 07/22/2020 10:57 AM    LDL-C, External 134 06/26/2015 07:42 AM    Triglyceride 262 (H) 07/26/2021 09:17 AM    CHOL/HDL Ratio 3.3 09/22/2010 10:02 AM     Lab Results   Component Value Date/Time    TSH 0.196 (L) 10/02/2019 11:28 AM    T3 Uptake 20 (L) 10/02/2019 11:28 AM    T4, Free 1.15 10/02/2019 11:28 AM    T4, Total 6.3 10/02/2019 11:28 AM      Lab Results   Component Value Date/Time    Sodium 143 11/30/2021 02:13 PM    Potassium 4.5 11/30/2021 02:13 PM    Chloride 104 11/30/2021 02:13 PM    CO2 27 11/30/2021 02:13 PM    Anion gap 5 05/21/2020 05:11 AM    Glucose 97 11/30/2021 02:13 PM    BUN 12 11/30/2021 02:13 PM    Creatinine 0.91 11/30/2021 02:13 PM    BUN/Creatinine ratio 13 11/30/2021 02:13 PM    GFR est AA 69 11/30/2021 02:13 PM    GFR est non-AA 60 11/30/2021 02:13 PM    Calcium 9.7 11/30/2021 02:13 PM    Bilirubin, total 0.3 11/30/2021 02:13 PM    ALT (SGPT) 10 11/30/2021 02:13 PM    Alk. phosphatase 103 11/30/2021 02:13 PM    Protein, total 6.6 11/30/2021 02:13 PM    Albumin 4.6 11/30/2021 02:13 PM    Globulin 3.0 05/19/2020 08:04 PM    A-G Ratio 2.3 (H) 11/30/2021 02:13 PM      Lab Results   Component Value Date/Time    Hemoglobin A1c 6.2 (H) 08/17/2021 11:17 AM    Hemoglobin A1c (POC) 5.9 02/17/2021 12:25 PM    Hemoglobin A1c, External 6.2 06/26/2015 12:00 AM         Review of Systems   Constitutional: Negative for malaise/fatigue. HENT: Negative for congestion. Eyes: Negative for blurred vision. Respiratory: Negative for cough and shortness of breath. Cardiovascular: Negative for chest pain, palpitations and leg swelling. Gastrointestinal: Negative for abdominal pain, constipation and heartburn. Genitourinary: Negative for dysuria, frequency and urgency. Neurological: Negative for dizziness, tingling and headaches. Endo/Heme/Allergies: Negative for environmental allergies. Psychiatric/Behavioral: Negative for depression. The patient does not have insomnia.         Physical Exam  Vitals and nursing note reviewed. Constitutional:       Appearance: Normal appearance. She is well-developed. Comments: /67 (BP 1 Location: Right arm, BP Patient Position: Sitting, BP Cuff Size: Adult)   Pulse 97   Temp 98 °F (36.7 °C) (Oral)   Resp 18   Ht 5' 1\" (1.549 m)   Wt 180 lb 6.4 oz (81.8 kg)   SpO2 97%   BMI 34.09 kg/m²        HENT:      Right Ear: Tympanic membrane and ear canal normal.      Left Ear: Tympanic membrane and ear canal normal.      Nose: No mucosal edema. Neck:      Thyroid: No thyromegaly. Vascular: No carotid bruit. Cardiovascular:      Rate and Rhythm: Normal rate and regular rhythm. Pulses: Normal pulses. Heart sounds: Normal heart sounds. Pulmonary:      Effort: Pulmonary effort is normal.      Breath sounds: Normal breath sounds. Chest:   Breasts:      Right: Normal. No axillary adenopathy or supraclavicular adenopathy. Left: Normal. No axillary adenopathy or supraclavicular adenopathy. Abdominal:      General: Bowel sounds are normal.      Palpations: Abdomen is soft. There is no mass. Tenderness: There is no abdominal tenderness. Musculoskeletal:         General: No swelling. Normal range of motion. Cervical back: Normal range of motion and neck supple. Right lower leg: No edema. Left lower leg: No edema. Lymphadenopathy:      Cervical: No cervical adenopathy. Upper Body:      Right upper body: No supraclavicular, axillary or pectoral adenopathy. Left upper body: No supraclavicular, axillary or pectoral adenopathy. Skin:     General: Skin is warm and dry. Neurological:      General: No focal deficit present. Mental Status: She is alert and oriented to person, place, and time. Psychiatric:         Mood and Affect: Mood normal.           ASSESSMENT and PLAN  Diagnoses and all orders for this visit:    1. Mixed hyperlipidemia  Continue to monitor. Work on diet and exercise.   - LIPID PANEL; Future    2. IGT (impaired glucose tolerance)  a1c improved to 6.0% down from 6.2% last visit. Continue to monitor. Work on diet and exercise. -     AMB POC HEMOGLOBIN A1C  -     AMB POC GLUCOSE, QUANTITATIVE, BLOOD    3. Coronary artery disease involving native coronary artery of native heart without angina pectoris  Stable     4. SSS (sick sinus syndrome) ([facility])//  5. Cardiac pacemaker in situ  Stable     6. Primary osteoarthritis involving multiple joints  Stable     7. Gastroesophageal reflux disease without esophagitis  Symptoms are stable on the protonix. -    Refill pantoprazole (PROTONIX) 40 mg tablet; Take 1 Tablet by mouth daily. 8. Hypovitaminosis D  -     VITAMIN D, 25 HYDROXY; Future    9. Anxiety  -    Refill LORazepam (ATIVAN) 1 mg tablet; Take 0.5-1 Tablets by mouth every eight (8) hours as needed for Anxiety. 10. Environmental allergies  Increase cough at times related to her allergies which comes and goes. -     Refill promethazine-codeine (PHENERGAN with CODEINE) 6.25-10 mg/5 mL syrup; Take 5 mL by mouth every six (6) hours as needed for Cough for up to 99 days. Max Daily Amount: 20 mL. 11. Encounter for medication monitoring  -     METABOLIC PANEL, COMPREHENSIVE; Future      Follow-up and Dispositions    · Return in about 6 months (around 8/18/2022) for medicare wellness exam.       reviewed diet, exercise and weight control  cardiovascular risk and specific lipid/LDL goals reviewed  reviewed medications and side effects in detail  specific diabetic recommendations: low cholesterol diet, weight control and daily exercise discussed and glycohemoglobin and other lab monitoring discussed     I have discussed diagnosis listed in this note with pt and/or family. I have discussed treatment plans and options and the risk/benefit analysis of those options, including safe use of medications and possible medication side effects.    Through the use of shared decision making we have agreed to the above plan. The patient has received an after-visit summary and questions were answered concerning future plans and follow up. Advise pt of any urgent changes then to proceed to the ER.

## 2022-02-18 NOTE — LETTER
CONTROLLED SUBSTANCE MEDICATION AGREEMENT  Patient Name: Amie Galvan  Patient YOB: 1940     I understand, that controlled substance medications may be used to help better manage my symptoms and to improve my ability to function at home, work and in social settings. However, I also understand that these medications do have risks, which have been discussed with me, including possible development of physical or psychological dependence. I understand that successful treatment requires mutual trust and honesty between me and my provider. I understand and agree that following this Medication Agreement is necessary in continuing my provider-patient relationship and the success of my treatment plan. Explanation from my Provider: Benefits and Goals of Controlled Substance Medications: There are two potential goals for your treatment: (1) decreased pain and suffering (2) improved daily life functions. There are many possible treatments for your chronic condition(s). Alternatives such as physical therapy, yoga, massage, home daily exercise, meditation, relaxation techniques, injections, chiropractic manipulations, surgery, cognitive therapy, hypnosis and many medications that are not habit-forming may be used. Use of controlled substance medications may be helpful, but they are unlikely to resolve all symptoms or restore all function. Explanation from my Provider: Risks of Controlled Substance Medications:   Opioid pain medications: These medications can lead to problems such as addiction/dependence, sedation, lightheadedness/dizziness, memory issues, falls, constipation, nausea, or vomiting. They may also impair the ability to drive or operate machinery. Additionally, these medications may lower testosterone levels, leading to loss of bone strength, stamina and sex drive.   They may cause problems with breathing, sleep apnea and reduced coughing, which is especially dangerous for patients with lung disease. Overdose or dangerous interactions with alcohol and other medications may occur, leading to death. Hyperalgesia may develop, which means patients receiving opioids for the treatment of pain may become more sensitive to certain painful stimuli, and in some cases, experience pain from ordinarily non-painful stimuli. Women between the ages of 14-53 who could become pregnant should carefully weigh the risks and benefits of opioids with their physicians, as these medications increase the risk of pregnancy complications, including miscarriage,  delivery and stillbirth. It is also possible for babies to be born addicted to opioids. Opioid dependence withdrawal symptoms may include; feelings of uneasiness, increased pain, irritability, belly pain, diarrhea, sweats and goose-flesh. Testosterone replacement therapy:  Potential side effects include increased risk of stroke and heart attack, blood clots, increased blood pressure, increased cholesterol, enlarged prostate, sleep apnea, irritability/aggression and other mood disorders, and decreased fertility. [employer] (1940)             Page 1 of 4    Initials:_______    Benzodiazepines and non-benzodiazepine sleep medications: These medications can lead to problems such as addiction/dependence, sedation, fatigue, lightheadedness, dizziness, incoordination, falls, depression, hallucinations, and impaired judgment, memory and concentration. The ability to drive and operate machinery may also be affected. Abnormal sleep-related behaviors have been reported, including sleepwalking, driving, making telephone calls, eating, or having sex while not fully awake. These medications can suppress breathing and worsen sleep apnea, particularly when combined with alcohol or other sedating medications, potentially leading to death. Dependence withdrawal symptoms may include tremors, anxiety, hallucinations and seizures.    Stimulants:  Common adverse effects include addiction/dependence, increased blood pressure and heart rate, decreased appetite, nausea, involuntary weight loss, insomnia,  irritability, and headaches. These risks may increase when these medications are combined with other stimulants, such as caffeine pills or energy drinks, certain weight loss supplements and oral decongestants. Dependence withdrawal symptoms may include depressed mood, loss of interest, suicidal thoughts, anxiety, fatigue, appetite changes and agitation. I agree and understand that I and my prescriber have the following rights and responsibilities regarding my treatment plan:   1. MY RIGHTS:  To be informed of my treatment and medication plan. To be an active participant in my health and wellbeing. 2. MY RESPONSIBILITY AND UNDERSTANDING FOR USE OF MEDICATIONS   I will take medications at the dose and frequency as directed. For my safety, I will not increase or change how I take my medications without the recommendation of my healthcare provider.  I will actively participate in any program recommended by my provider which may improve function, including social, physical, psychological programs.  I will not take my medications with alcohol or other drugs not prescribed to me. I understand that drinking alcohol with my medications increases the chances of side effects, including reduced breathing rate and could lead to personal injury when operating machinery.  I understand that if I have a history of substance use disorders, including alcohol or other illicit drugs, that I may be at increased risk of addiction to my medications.  I agree to notify my provider immediately if I should become pregnant so that my treatment plan can be adjusted.    I agree and understand that I shall only receive controlled substance medications from the prescriber that signed this agreement unless there is written agreement among other prescribers of controlled substances outlining the responsibility of the medications being prescribed.  I understand that the if the controlled medication is not helping to achieve goals, the dosage may be tapered and no longer prescribed. 3. MY RESPONSIBILITY FOR COMMUNICATION / PRESCRIPTION RENEWALS   I agree that all controlled substance medications that I take will be prescribed only by my provider. If another healthcare provider prescribes me medication in an emergency, I will notify my provider within seventy-two (72) hours. Montrell Bautista (1940)             Page 2 of 4    Initials:_______  Galvan I will arrange for refills at the prescribed interval ONLY during regular office hours. I will not ask for refills earlier than agreed, after-hours, on holidays or weekends. Refills may take up to 72 hours for processing and prescriptions to reach the pharmacy.  I will inform my other health care providers that I am taking these medications and of the existence of this [id number]. In the event of an emergency, I will provide the same information to the emergency department prescribers.  I will keep my provider updated on the pharmacy I am using for controlled medication prescription filling. 4. MY RESPONSIBILITY FOR PROTECTING MEDICATIONS   I will protect my prescriptions and medications. I understand that lost or misplaced prescriptions will not be replaced.  I will keep medications only for my own use and will not share them with others. I will keep all medications away from children.  I agree that if my medications are adjusted or discontinued, I will properly dispose of any remaining medications. I understand that I will be required to dispose of any remaining controlled medications as, directed by my prescriber, prior to being provided with any prescriptions for other controlled medications.   Medication drop box locations can be found at: [employer]  5. MY RESPONSIBILITY WITH ILLEGAL DRUGS    I will not use illegal or street drugs or another person's prescription medications not prescribed to me.  If there are identified addiction type symptoms, then referral to a program may be provided by my provider and I agree to follow through with this recommendation. 6. MY RESPONSIBILITY FOR COOPERATION WITH INVESTIGATIONS   I understand that my provider will comply with any applicable law and may discuss my use and/or possible misuse/abuse of controlled substances and alcohol, as appropriate, with any health care provider involved in my care, pharmacist, or legal authority.  I authorize my provider and pharmacy to cooperate fully with law enforcement agencies (as permitted by law) in the investigation of any possible misuse, sale, or other diversion of my controlled substances.  I agree to waive any applicable privilege or right of privacy or confidentiality with respect to these authorizations. 7. PROVIDERS RIGHT TO MONITOR FOR SAFETY: PRESCRIPTION MONITORING / DRUG TESTING   I consent to drug/toxicology screening and will submit to a drug screen upon my providers request to assure I am only taking the prescribed drugs for my safety monitoring. I understand that a drug screen is a laboratory test in which a sample of my urine, blood or saliva is checked to see what drugs I have been taking. This may entail an observed urine specimen, which means that a nurse or other health care provider may watch me provide urine, and I will cooperate if I am asked to provide an observed specimen. Aj Reis (1940)             Page 3 of 4    Initials:_______  [facility] I understand that my provider will check a copy of my State Prescription Monitoring Program () Report in order to safely prescribe medications.      Pill Counts: I consent to pill counts when requested. I may be asked to bring all my prescribed controlled substance medications, in their original bottles, to all of my scheduled appointments. In addition, my provider may ask me to come to the practice at any time for a random pill count. 8. TERMINATION OF THIS AGREEMENT   For my safety, my prescriber has the right to stop prescribing controlled substance medications and may end this agreement.  Conditions that may result in termination of this agreement:  a. I do not show any improvement in pain, or my activity has not improved. b. I develop rapid tolerance or loss of improvement, as described in my treatment plan.  c. I develop significant side effects from the medication. d. My behavior is not consistent with the responsibilities outlined above, thereby causing safety concerns to continue prescribing controlled substance medications. e. I fail to follow the terms of this agreement. f. Other:____________________________     UNDERSTANDING THIS MEDICATION AGREEMENT:    I have read the above and have had all my questions answered. For chronic disease management, I know that my symptoms can be managed with many types of treatments. A chronic medication trial may be part of my treatment, but I must be an active participant in my care. Medication therapy is only one part of my symptom management plan. In some cases, there may be limited scientific evidence to support the chronic use of certain medications to improve symptoms and daily function. Furthermore, in certain circumstances, there may be scientific information that suggests that the use of chronic controlled substances may worsen my symptoms and increase my risk of unintentional death directly related to this medication therapy.   I know that if my provider feels my risk from controlled medications is greater than my benefit, I will have my controlled substance medication(s) compassionately lowered or removed altogether. I further agree to allow this office to contact my HIPAA contact if there are concerns about my safety and use of the controlled medications. I have agreed to use the prescribed controlled substance medications to me as instructed by my provider and as stated in this Medication Agreement. My initial on each page and my signature below shows that I have read each page and I have had the opportunity to ask questions with answers provided by my provider.       Patient Name (Printed): _____________________________________    Patient Signature:  ______________________   Date: _____________      Prescriber Name (Printed): ___________________________________    Prescriber Signature: _____________________  Date: _____________     Isaac Ryder (1940)             Page 4 of 4

## 2022-02-18 NOTE — PROGRESS NOTES
Identified pt with two pt identifiers(name and ). Reviewed record in preparation for visit and have obtained necessary documentation. All patient medications has been reviewed. Chief Complaint   Patient presents with    Hypertension    Cholesterol Problem    Follow-up       3 most recent PHQ Screens 2022   Little interest or pleasure in doing things Not at all   Feeling down, depressed, irritable, or hopeless Not at all   Total Score PHQ 2 0     Abuse Screening Questionnaire 2022   Do you ever feel afraid of your partner? N   Are you in a relationship with someone who physically or mentally threatens you? N   Is it safe for you to go home? Y       Health Maintenance Due   Topic    COVID-19 Vaccine (3 - Booster for Nettles Peter series)     Health Maintenance Review: Patient reminded of \"due or due soon\" health maintenance. I have asked the patient to contact his/her primary care provider (PCP) for follow-up on his/her health maintenance. Vitals:    22 0945   BP: 136/67   Pulse: 97   Resp: 18   Temp: 98 °F (36.7 °C)   TempSrc: Oral   SpO2: 97%   Weight: 180 lb 6.4 oz (81.8 kg)   Height: 5' 1\" (1.549 m)       Wt Readings from Last 3 Encounters:   22 180 lb 6.4 oz (81.8 kg)   22 181 lb 9.6 oz (82.4 kg)   12/10/21 178 lb (80.7 kg)     Temp Readings from Last 3 Encounters:   22 98 °F (36.7 °C) (Oral)   12/10/21 97.6 °F (36.4 °C)   21 97.8 °F (36.6 °C) (Oral)     BP Readings from Last 3 Encounters:   22 136/67   22 138/78   12/10/21 (!) 108/43     Pulse Readings from Last 3 Encounters:   22 97   22 70   12/10/21 74       Coordination of Care Questionnaire:   1) Have you been to an emergency room, urgent care, or hospitalized since your last visit?   no      2.  Have seen or consulted any other health care provider since your last visit?   no

## 2022-02-19 LAB
ALBUMIN SERPL-MCNC: 3.8 G/DL (ref 3.5–5)
ALBUMIN/GLOB SERPL: 1.4 {RATIO} (ref 1.1–2.2)
ALP SERPL-CCNC: 95 U/L (ref 45–117)
ALT SERPL-CCNC: 15 U/L (ref 12–78)
ANION GAP SERPL CALC-SCNC: 9 MMOL/L (ref 5–15)
AST SERPL-CCNC: 8 U/L (ref 15–37)
BILIRUB SERPL-MCNC: 0.5 MG/DL (ref 0.2–1)
BUN SERPL-MCNC: 18 MG/DL (ref 6–20)
BUN/CREAT SERPL: 17 (ref 12–20)
CALCIUM SERPL-MCNC: 9.9 MG/DL (ref 8.5–10.1)
CHLORIDE SERPL-SCNC: 108 MMOL/L (ref 97–108)
CO2 SERPL-SCNC: 21 MMOL/L (ref 21–32)
CREAT SERPL-MCNC: 1.05 MG/DL (ref 0.55–1.02)
GLOBULIN SER CALC-MCNC: 2.8 G/DL (ref 2–4)
GLUCOSE SERPL-MCNC: 124 MG/DL (ref 65–100)
POTASSIUM SERPL-SCNC: 4.5 MMOL/L (ref 3.5–5.1)
PROT SERPL-MCNC: 6.6 G/DL (ref 6.4–8.2)
SODIUM SERPL-SCNC: 138 MMOL/L (ref 136–145)

## 2022-02-21 RX ORDER — LISINOPRIL 2.5 MG/1
2.5 TABLET ORAL DAILY
Qty: 90 TABLET | Refills: 3 | Status: SHIPPED | OUTPATIENT
Start: 2022-02-21 | End: 2022-09-26 | Stop reason: ALTCHOICE

## 2022-02-21 RX ORDER — METOPROLOL SUCCINATE 25 MG/1
25 TABLET, EXTENDED RELEASE ORAL
Qty: 90 TABLET | Refills: 3 | Status: SHIPPED | OUTPATIENT
Start: 2022-02-21

## 2022-03-08 ENCOUNTER — ANCILLARY PROCEDURE (OUTPATIENT)
Dept: CARDIOLOGY CLINIC | Age: 82
End: 2022-03-08
Payer: MEDICARE

## 2022-03-08 VITALS
SYSTOLIC BLOOD PRESSURE: 136 MMHG | HEIGHT: 61 IN | BODY MASS INDEX: 33.99 KG/M2 | WEIGHT: 180 LBS | DIASTOLIC BLOOD PRESSURE: 67 MMHG

## 2022-03-08 DIAGNOSIS — I25.10 CORONARY ARTERY DISEASE INVOLVING NATIVE CORONARY ARTERY OF NATIVE HEART WITHOUT ANGINA PECTORIS: ICD-10-CM

## 2022-03-08 DIAGNOSIS — Z95.0 PACEMAKER: ICD-10-CM

## 2022-03-08 DIAGNOSIS — I10 ESSENTIAL HYPERTENSION: ICD-10-CM

## 2022-03-08 DIAGNOSIS — I49.5 SICK SINUS SYNDROME (HCC): ICD-10-CM

## 2022-03-08 PROCEDURE — 93306 TTE W/DOPPLER COMPLETE: CPT | Performed by: INTERNAL MEDICINE

## 2022-03-09 LAB
ECHO AO ASC DIAM: 3 CM
ECHO AO ASCENDING AORTA INDEX: 1.66 CM/M2
ECHO AO ROOT DIAM: 3.1 CM
ECHO AO ROOT INDEX: 1.71 CM/M2
ECHO AV AREA PEAK VELOCITY: 1.7 CM2
ECHO AV AREA/BSA PEAK VELOCITY: 0.9 CM2/M2
ECHO AV PEAK GRADIENT: 13 MMHG
ECHO AV PEAK VELOCITY: 1.8 M/S
ECHO LA DIAMETER INDEX: 1.66 CM/M2
ECHO LA DIAMETER: 3 CM
ECHO LA TO AORTIC ROOT RATIO: 0.97
ECHO LA VOL 2C: 71 ML (ref 22–52)
ECHO LA VOL 4C: 59 ML (ref 22–52)
ECHO LA VOL BP: 65 ML (ref 22–52)
ECHO LA VOL BP: 65 ML (ref 22–52)
ECHO LA VOLUME AREA LENGTH: 74 ML
ECHO LA VOLUME INDEX A2C: 39 ML/M2 (ref 16–34)
ECHO LA VOLUME INDEX A4C: 33 ML/M2 (ref 16–34)
ECHO LA VOLUME INDEX AREA LENGTH: 41 ML/M2 (ref 16–34)
ECHO LV E' LATERAL VELOCITY: 16 CM/S
ECHO LV E' SEPTAL VELOCITY: 5 CM/S
ECHO LV EDV A2C: 108 ML
ECHO LV EDV A4C: 94 ML
ECHO LV EDV BP: 102 ML (ref 56–104)
ECHO LV EDV INDEX A4C: 52 ML/M2
ECHO LV EDV INDEX BP: 56 ML/M2
ECHO LV EDV NDEX A2C: 60 ML/M2
ECHO LV EJECTION FRACTION A2C: 51 %
ECHO LV EJECTION FRACTION A4C: 30 %
ECHO LV EJECTION FRACTION BIPLANE: 41 % (ref 55–100)
ECHO LV EJECTION FRACTION BIPLANE: 41 % (ref 55–100)
ECHO LV ESV A2C: 53 ML
ECHO LV ESV A4C: 66 ML
ECHO LV ESV BP: 60 ML (ref 19–49)
ECHO LV ESV INDEX A2C: 29 ML/M2
ECHO LV ESV INDEX A4C: 36 ML/M2
ECHO LV ESV INDEX BP: 33 ML/M2
ECHO LV FRACTIONAL SHORTENING: 20 % (ref 28–44)
ECHO LV INTERNAL DIMENSION DIASTOLE INDEX: 2.98 CM/M2
ECHO LV INTERNAL DIMENSION DIASTOLIC: 5.4 CM (ref 3.9–5.3)
ECHO LV INTERNAL DIMENSION SYSTOLIC INDEX: 2.38 CM/M2
ECHO LV INTERNAL DIMENSION SYSTOLIC: 4.3 CM
ECHO LV IVSD: 1 CM (ref 0.6–0.9)
ECHO LV MASS 2D: 206.7 G (ref 67–162)
ECHO LV MASS INDEX 2D: 114.2 G/M2 (ref 43–95)
ECHO LV POSTERIOR WALL DIASTOLIC: 1 CM (ref 0.6–0.9)
ECHO LV RELATIVE WALL THICKNESS RATIO: 0.37
ECHO LVOT AREA: 3.1 CM2
ECHO LVOT DIAM: 2 CM
ECHO LVOT MEAN GRADIENT: 2 MMHG
ECHO LVOT PEAK GRADIENT: 4 MMHG
ECHO LVOT PEAK GRADIENT: 4 MMHG
ECHO LVOT PEAK VELOCITY: 1 M/S
ECHO LVOT PEAK VELOCITY: 1 M/S
ECHO LVOT STROKE VOLUME INDEX: 36.1 ML/M2
ECHO LVOT SV: 65.3 ML
ECHO LVOT VTI: 20.8 CM
ECHO MV A VELOCITY: 0.57 M/S
ECHO MV E DECELERATION TIME (DT): 175.8 MS
ECHO MV E VELOCITY: 1.35 M/S
ECHO MV E/A RATIO: 2.37
ECHO MV E/E' LATERAL: 8.44
ECHO MV E/E' RATIO (AVERAGED): 17.72
ECHO MV E/E' SEPTAL: 27
ECHO MV REGURGITANT PEAK GRADIENT: 108 MMHG
ECHO MV REGURGITANT PEAK VELOCITY: 5.2 M/S
ECHO RV TAPSE: 2.1 CM (ref 1.5–2)

## 2022-03-09 PROCEDURE — 93306 TTE W/DOPPLER COMPLETE: CPT | Performed by: INTERNAL MEDICINE

## 2022-03-10 ENCOUNTER — TELEPHONE (OUTPATIENT)
Dept: CARDIOLOGY CLINIC | Age: 82
End: 2022-03-10

## 2022-03-10 NOTE — TELEPHONE ENCOUNTER
----- Message from Vlad Alaniz NP sent at 3/10/2022  8:53 AM EST -----  Her heart pumping has decreased a bit from her last echo despite her medications to help this. This is an indication to add another lead to her pacemaker to help the chamber squeeze more in sync. She can meet with Dr Lilia Pierce to discuss.

## 2022-03-10 NOTE — PROGRESS NOTES
Her heart pumping has decreased a bit from her last echo despite her medications to help this. This is an indication to add another lead to her pacemaker to help the chamber squeeze more in sync. She can meet with Dr Monica Duvall to discuss.

## 2022-03-17 ENCOUNTER — ANESTHESIA EVENT (OUTPATIENT)
Dept: CARDIAC CATH/INVASIVE PROCEDURES | Age: 82
End: 2022-03-17
Payer: MEDICARE

## 2022-03-17 ENCOUNTER — HOSPITAL ENCOUNTER (OUTPATIENT)
Dept: GENERAL RADIOLOGY | Age: 82
Discharge: HOME | End: 2022-03-17
Payer: MEDICARE

## 2022-03-17 ENCOUNTER — OFFICE VISIT (OUTPATIENT)
Dept: CARDIOLOGY CLINIC | Age: 82
End: 2022-03-17
Payer: MEDICARE

## 2022-03-17 VITALS
WEIGHT: 180.9 LBS | HEIGHT: 61 IN | OXYGEN SATURATION: 97 % | SYSTOLIC BLOOD PRESSURE: 128 MMHG | DIASTOLIC BLOOD PRESSURE: 74 MMHG | HEART RATE: 67 BPM | BODY MASS INDEX: 34.15 KG/M2 | RESPIRATION RATE: 18 BRPM

## 2022-03-17 DIAGNOSIS — I50.22 CHRONIC SYSTOLIC CONGESTIVE HEART FAILURE (HCC): ICD-10-CM

## 2022-03-17 DIAGNOSIS — E78.2 MIXED HYPERLIPIDEMIA: ICD-10-CM

## 2022-03-17 DIAGNOSIS — I49.5 SICK SINUS SYNDROME (HCC): ICD-10-CM

## 2022-03-17 DIAGNOSIS — I49.5 SICK SINUS SYNDROME (HCC): Primary | ICD-10-CM

## 2022-03-17 DIAGNOSIS — I42.0 DILATED CARDIOMYOPATHY (HCC): ICD-10-CM

## 2022-03-17 DIAGNOSIS — I44.2 CHB (COMPLETE HEART BLOCK) (HCC): ICD-10-CM

## 2022-03-17 PROCEDURE — 93010 ELECTROCARDIOGRAM REPORT: CPT | Performed by: INTERNAL MEDICINE

## 2022-03-17 PROCEDURE — G8754 DIAS BP LESS 90: HCPCS | Performed by: INTERNAL MEDICINE

## 2022-03-17 PROCEDURE — 99215 OFFICE O/P EST HI 40 MIN: CPT | Performed by: INTERNAL MEDICINE

## 2022-03-17 PROCEDURE — 71046 X-RAY EXAM CHEST 2 VIEWS: CPT

## 2022-03-17 PROCEDURE — G0463 HOSPITAL OUTPT CLINIC VISIT: HCPCS | Performed by: INTERNAL MEDICINE

## 2022-03-17 PROCEDURE — G8752 SYS BP LESS 140: HCPCS | Performed by: INTERNAL MEDICINE

## 2022-03-17 PROCEDURE — 1101F PT FALLS ASSESS-DOCD LE1/YR: CPT | Performed by: INTERNAL MEDICINE

## 2022-03-17 PROCEDURE — 93005 ELECTROCARDIOGRAM TRACING: CPT | Performed by: INTERNAL MEDICINE

## 2022-03-17 PROCEDURE — G8427 DOCREV CUR MEDS BY ELIG CLIN: HCPCS | Performed by: INTERNAL MEDICINE

## 2022-03-17 PROCEDURE — 1090F PRES/ABSN URINE INCON ASSESS: CPT | Performed by: INTERNAL MEDICINE

## 2022-03-17 PROCEDURE — G8536 NO DOC ELDER MAL SCRN: HCPCS | Performed by: INTERNAL MEDICINE

## 2022-03-17 PROCEDURE — G8399 PT W/DXA RESULTS DOCUMENT: HCPCS | Performed by: INTERNAL MEDICINE

## 2022-03-17 PROCEDURE — G8510 SCR DEP NEG, NO PLAN REQD: HCPCS | Performed by: INTERNAL MEDICINE

## 2022-03-17 PROCEDURE — G8417 CALC BMI ABV UP PARAM F/U: HCPCS | Performed by: INTERNAL MEDICINE

## 2022-03-17 NOTE — H&P (VIEW-ONLY)
Subjective:      Juan R Ordaz is a [de-identified] y.o. female is here for EP consult. She has sob and fatigue. Repeat echo demonstrated a drop in her lvef.       Patient Active Problem List    Diagnosis Date Noted    Mixed hyperlipidemia 08/04/2021    Coronary artery disease involving native coronary artery of native heart without angina pectoris 08/04/2021    S/P placement of cardiac pacemaker 05/20/2020    S/P cardiac cath 05/20/2020    IGT (impaired glucose tolerance) 03/28/2017    Encounter for medication monitoring 09/28/2016    Environmental allergies 09/21/2011    Anxiety 03/24/2011    Hypertriglyceridemia 02/23/2010    GERD (gastroesophageal reflux disease) 02/23/2010    OA (osteoarthritis) 02/23/2010    Vitamin D deficiency 02/23/2010      Gopal Nobles MD  Past Medical History:   Diagnosis Date    AV block, 3rd degree ([address] 5/20/2020    CAD (coronary artery disease)     Cancer ([address] 07/2021    basal cell- nose    GERD (gastroesophageal reflux disease) 2/23/2010    Hypertriglyceridemia 2/23/2010    Hypovitaminosis D     Long term current use of anticoagulant therapy     ASA 81 mg    NSVT (nonsustained ventricular tachycardia) ([address] 5/20/2020    OA (osteoarthritis) 2/23/2010    Osteoarthritis     Pacemaker     S/P cardiac cath 5/20/2020    S/P placement of cardiac pacemaker 5/20/2020 5/20/2020 Medtronic dual chamber pacemaker implant     Syncope     Vitamin D deficiency 2/23/2010      Past Surgical History:   Procedure Laterality Date    HX ACL RECONSTRUCTION  1998    HX CATARACT REMOVAL  06/2018    right eye    HX HEART CATHETERIZATION      HX HEENT  12/08    cervical neck stenosis s/p cervical release    HX HERNIA REPAIR  6669    umbilical    HX HIP REPLACEMENT  6/2007    right    HX HIP REPLACEMENT  12/2007    left    HX ORTHOPAEDIC  12/2008    cervical release    HX SKIN BIOPSY  07/2021    nose    MS COLONOSCOPY FLX DX W/COLLJ SPEC WHEN PFRMD 2006    CA COLONOSCOPY FLX DX W/COLLJ SPEC WHEN PFRMD  2011    Dr. Lynn Ka INS NEW/RPLCMT PRM PM W/TRANSV ELTRD ATRIAL&VENT N/A 2020    Insert Ppm Dual performed by Shella Goodell, MD at [facility] CARDIAC CATH LAB     Allergies   Allergen Reactions    Macrobid [Nitrofurantoin Monohyd/M-Cryst] Rash    Pcn [Penicillins] Hives      Family History   Problem Relation Age of Onset    Hypertension Mother     Heart Failure Mother     Cancer Father         colon    Lung Disease Brother     Arthritis-rheumatoid Brother     Heart Disease Brother     negative for cardiac disease  Social History     Socioeconomic History    Marital status:    Tobacco Use    Smoking status: Former Smoker     Packs/day: 0.25     Years: 55.00     Pack years: 13.75     Quit date: 2019     Years since quittin.3    Smokeless tobacco: Never Used   Vaping Use    Vaping Use: Never used   Substance and Sexual Activity    Alcohol use: Yes     Alcohol/week: 0.0 standard drinks     Comment: occastional    Drug use: No    Sexual activity: Not Currently     Current Outpatient Medications   Medication Sig    lisinopriL (PRINIVIL, ZESTRIL) 2.5 mg tablet Take 1 Tablet by mouth daily.  metoprolol succinate (TOPROL-XL) 25 mg XL tablet Take 1 Tablet by mouth nightly.  promethazine-codeine (PHENERGAN with CODEINE) 6.25-10 mg/5 mL syrup Take 5 mL by mouth every six (6) hours as needed for Cough for up to 99 days. Max Daily Amount: 20 mL.  pantoprazole (PROTONIX) 40 mg tablet Take 1 Tablet by mouth daily.  LORazepam (ATIVAN) 1 mg tablet Take 0.5-1 Tablets by mouth every eight (8) hours as needed for Anxiety.  ranolazine ER (Ranexa) 500 mg SR tablet Take 1 Tablet by mouth two (2) times a day.  rosuvastatin (CRESTOR) 20 mg tablet Take 1 Tablet by mouth nightly.  aspirin delayed-release 81 mg tablet Take 1 Tab by mouth daily.  cetirizine (ZYRTEC) 10 mg tablet Take 10 mg by mouth daily.     Cholecalciferol, Vitamin D3, (VITAMIN D3) 1,000 unit Cap Take 1 Cap by mouth daily. No current facility-administered medications for this visit. Vitals:    03/17/22 1105   BP: 128/74   Pulse: 67   Resp: 18   SpO2: 97%   Weight: 180 lb 14.4 oz (82.1 kg)   Height: 5' 1\" (1.549 m)       I have reviewed the nurses notes, vitals, problem list, allergy list, medical history, family, social history and medications. Review of Symptoms:    General: Pt denies excessive weight gain or loss. Pt is able to conduct ADL's  HEENT: Denies blurred vision, headaches, hearing loss, epistaxis and difficulty swallowing. Respiratory: +sob, solorio, Denies cough, congestion,wheezing or stridor. Cardiovascular: Denies precordial pain, palpitations, edema or PND  Gastrointestinal: Denies poor appetite, indigestion, abdominal pain or blood in stool  Genitourinary: Denies hematuria, dysuria, increased urinary frequency  Musculoskeletal: Denies joint pain or swelling from muscles or joints  Neurologic: Denies tremor, paresthesias, headache, or sensory motor disturbance  Psychiatric: Denies confusion, insomnia, depression  Integumentray: Denies rash, itching or ulcers. Hematologic: Denies easy bruising, bleeding    Physical Exam:      General: Well developed, in no acute distress. HEENT: Eyes - PERRL, no jvd  Heart:  Normal S1/S2 negative S3 or S4. Regular, no murmur, gallop or rub. Respiratory: Clear bilaterally x 4, no wheezing or rales  Abdomen:   Soft, non-tender, bowel sounds are active. Extremities:  No edema, normal cap refill, no cyanosis. Musculoskeletal: No clubbing  Neuro: A&Ox3, speech clear, gait stable. Skin: Skin color is normal. No rashes or lesions.  Non diaphoretic, no ulcers or subcutaneous nodule  Vascular: 2+ pulses symmetric in all extremities  Psych - judgement intact and orientation is wnl     Cardiographics    Ekg: v paced    Echo - lvef 35-40    Results for orders placed or performed during the hospital encounter of 05/19/20   EKG, 12 LEAD, INITIAL   Result Value Ref Range    Ventricular Rate 132 BPM    Atrial Rate 133 BPM    QRS Duration 144 ms    Q-T Interval 372 ms    QTC Calculation (Bezet) 551 ms    Calculated R Axis -65 degrees    Calculated T Axis 86 degrees    Diagnosis       Wide QRS tachycardia  Left axis deviation  Left bundle branch block    Confirmed by Evangelina Mooney ([id number]) on 5/20/2020 9:13:03 AM           Lab Results   Component Value Date/Time    WBC 8.8 11/30/2021 02:13 PM    HGB 13.7 11/30/2021 02:13 PM    HCT 42.1 11/30/2021 02:13 PM    PLATELET 408 31/89/4111 02:13 PM    MCV 92 11/30/2021 02:13 PM      Lab Results   Component Value Date/Time    Sodium 137 02/18/2022 10:17 AM    Sodium 138 02/18/2022 10:17 AM    Potassium 4.5 02/18/2022 10:17 AM    Potassium 4.5 02/18/2022 10:17 AM    Chloride 109 (H) 02/18/2022 10:17 AM    Chloride 108 02/18/2022 10:17 AM    CO2 23 02/18/2022 10:17 AM    CO2 21 02/18/2022 10:17 AM    Anion gap 5 02/18/2022 10:17 AM    Anion gap 9 02/18/2022 10:17 AM    Glucose 124 (H) 02/18/2022 10:17 AM    Glucose 124 (H) 02/18/2022 10:17 AM    BUN 17 02/18/2022 10:17 AM    BUN 18 02/18/2022 10:17 AM    Creatinine 1.05 (H) 02/18/2022 10:17 AM    Creatinine 1.05 (H) 02/18/2022 10:17 AM    BUN/Creatinine ratio 16 02/18/2022 10:17 AM    BUN/Creatinine ratio 17 02/18/2022 10:17 AM    GFR est AA >60 02/18/2022 10:17 AM    GFR est AA >60 02/18/2022 10:17 AM    GFR est non-AA 50 (L) 02/18/2022 10:17 AM    GFR est non-AA 50 (L) 02/18/2022 10:17 AM    Calcium 9.7 02/18/2022 10:17 AM    Calcium 9.9 02/18/2022 10:17 AM    Bilirubin, total 0.5 02/18/2022 10:17 AM    Bilirubin, total 0.5 02/18/2022 10:17 AM    Alk. phosphatase 93 02/18/2022 10:17 AM    Alk.  phosphatase 95 02/18/2022 10:17 AM    Protein, total 6.5 02/18/2022 10:17 AM    Protein, total 6.6 02/18/2022 10:17 AM    Albumin 3.8 02/18/2022 10:17 AM    Albumin 3.8 02/18/2022 10:17 AM    Globulin 2.7 02/18/2022 10:17 AM    Globulin 2.8 02/18/2022 10:17 AM    A-G Ratio 1.4 02/18/2022 10:17 AM    A-G Ratio 1.4 02/18/2022 10:17 AM    ALT (SGPT) 16 02/18/2022 10:17 AM    ALT (SGPT) 15 02/18/2022 10:17 AM         Assessment:     Assessment:        ICD-10-CM ICD-9-CM    1. Sick sinus syndrome (HCC)  I49.5 427.81 AMB POC EKG ROUTINE W/ 12 LEADS, INTER & REP      CBC WITH AUTOMATED DIFF      PROTHROMBIN TIME + INR      METABOLIC PANEL, COMPREHENSIVE      XR CHEST PA LAT   2. Mixed hyperlipidemia  E78.2 272.2 CBC WITH AUTOMATED DIFF      PROTHROMBIN TIME + INR      METABOLIC PANEL, COMPREHENSIVE      XR CHEST PA LAT   3. Dilated cardiomyopathy (HCC)  I42.0 425.4 CBC WITH AUTOMATED DIFF      PROTHROMBIN TIME + INR      METABOLIC PANEL, COMPREHENSIVE      XR CHEST PA LAT   4. Chronic systolic congestive heart failure (HCC)  I50.22 428.22 CBC WITH AUTOMATED DIFF     428.0 PROTHROMBIN TIME + INR      METABOLIC PANEL, COMPREHENSIVE      XR CHEST PA LAT   5. CHB (complete heart block) (HCC)  I44.2 426.0 CBC WITH AUTOMATED DIFF      PROTHROMBIN TIME + INR      METABOLIC PANEL, COMPREHENSIVE      XR CHEST PA LAT     Orders Placed This Encounter    XR CHEST PA LAT     Standing Status:   Future     Standing Expiration Date:   4/17/2023     Order Specific Question:   Reason for Exam     Answer:   pre op    CBC WITH AUTOMATED DIFF     Standing Status:   Future     Standing Expiration Date:   9/17/2022    PROTHROMBIN TIME + INR     Standing Status:   Future     Standing Expiration Date:   1/26/6043    METABOLIC PANEL, COMPREHENSIVE     Standing Status:   Future     Standing Expiration Date:   9/17/2022    AMB POC EKG ROUTINE W/ 12 LEADS, INTER & REP     Order Specific Question:   Reason for Exam:     Answer:   ROUTINE        Plan:   Valeri Lesches is having class III CHF and her cardiomyopathy has worsened to lvef 35-40 on GDMT.  She is 100% v paced and a candidate for an upgrade to a biv ppm. I discussed the risks/benefits/alternatives of the procedure with the patient. Risks include (but are not limited to) bleeding, heart block, infection, cva/mi/tamponade/death. The patient understands and agrees to proceed. Thank you for this interesting consultation. Thank you for allowing me to participate in Holger Douglass 's care.     María Power MD, Poly Gamez

## 2022-03-17 NOTE — LETTER
3/17/2022    Patient: Ivan Flores   YOB: 1940   Date of Visit: 3/17/2022     David Conklin MD  [address] [place] [address]  Via In Basket    Dear David Conklin MD,      Thank you for referring Ms. Kat Hubbard to [address] [place] [address] for evaluation. My notes for this consultation are attached. If you have questions, please do not hesitate to call me. I look forward to following your patient along with you.       Sincerely,    Luis Miguel MD

## 2022-03-17 NOTE — PROGRESS NOTES
1. Have you been to the ER, urgent care clinic since your last visit? Hospitalized since your last visit? No    2. Have you seen or consulted any other health care providers outside of the [address] [place] [address] since your last visit? Include any pap smears or colon screening.  No         Chief Complaint   Patient presents with    Heart Problem     C/O  SOB with exertion, Fatigue

## 2022-03-17 NOTE — PROGRESS NOTES
Subjective:      Aj Reis is a [de-identified] y.o. female is here for EP consult. She has sob and fatigue. Repeat echo demonstrated a drop in her lvef.       Patient Active Problem List    Diagnosis Date Noted    Mixed hyperlipidemia 08/04/2021    Coronary artery disease involving native coronary artery of native heart without angina pectoris 08/04/2021    S/P placement of cardiac pacemaker 05/20/2020    S/P cardiac cath 05/20/2020    IGT (impaired glucose tolerance) 03/28/2017    Encounter for medication monitoring 09/28/2016    Environmental allergies 09/21/2011    Anxiety 03/24/2011    Hypertriglyceridemia 02/23/2010    GERD (gastroesophageal reflux disease) 02/23/2010    OA (osteoarthritis) 02/23/2010    Vitamin D deficiency 02/23/2010      Randy Jiménez MD  Past Medical History:   Diagnosis Date    AV block, 3rd degree ([address] 5/20/2020    CAD (coronary artery disease)     Cancer ([address] 07/2021    basal cell- nose    GERD (gastroesophageal reflux disease) 2/23/2010    Hypertriglyceridemia 2/23/2010    Hypovitaminosis D     Long term current use of anticoagulant therapy     ASA 81 mg    NSVT (nonsustained ventricular tachycardia) ([address] 5/20/2020    OA (osteoarthritis) 2/23/2010    Osteoarthritis     Pacemaker     S/P cardiac cath 5/20/2020    S/P placement of cardiac pacemaker 5/20/2020 5/20/2020 Medtronic dual chamber pacemaker implant     Syncope     Vitamin D deficiency 2/23/2010      Past Surgical History:   Procedure Laterality Date    HX ACL RECONSTRUCTION  1998    HX CATARACT REMOVAL  06/2018    right eye    HX HEART CATHETERIZATION      HX HEENT  12/08    cervical neck stenosis s/p cervical release    HX HERNIA REPAIR  6851    umbilical    HX HIP REPLACEMENT  6/2007    right    HX HIP REPLACEMENT  12/2007    left    HX ORTHOPAEDIC  12/2008    cervical release    HX SKIN BIOPSY  07/2021    nose    [place] COLONOSCOPY FLX DX W/COLLJ SPEC WHEN PFRMD 2006    [place] COLONOSCOPY FLX DX W/COLLJ SPEC WHEN PFRMD  2011    Dr. Ivelisse Guzman INS NEW/RPLCMT PRM PM W/TRANSV ELTRD ATRIAL&VENT N/A 2020    Insert Ppm Dual performed by Amalia Martinez MD at [facility] CARDIAC CATH LAB     Allergies   Allergen Reactions    Macrobid [Nitrofurantoin Monohyd/M-Cryst] Rash    Pcn [Penicillins] Hives      Family History   Problem Relation Age of Onset    Hypertension Mother     Heart Failure Mother     Cancer Father         colon    Lung Disease Brother     Arthritis-rheumatoid Brother     Heart Disease Brother     negative for cardiac disease  Social History     Socioeconomic History    Marital status:    Tobacco Use    Smoking status: Former Smoker     Packs/day: 0.25     Years: 55.00     Pack years: 13.75     Quit date: 2019     Years since quittin.3    Smokeless tobacco: Never Used   Vaping Use    Vaping Use: Never used   Substance and Sexual Activity    Alcohol use: Yes     Alcohol/week: 0.0 standard drinks     Comment: occastional    Drug use: No    Sexual activity: Not Currently     Current Outpatient Medications   Medication Sig    lisinopriL (PRINIVIL, ZESTRIL) 2.5 mg tablet Take 1 Tablet by mouth daily.  metoprolol succinate (TOPROL-XL) 25 mg XL tablet Take 1 Tablet by mouth nightly.  promethazine-codeine (PHENERGAN with CODEINE) 6.25-10 mg/5 mL syrup Take 5 mL by mouth every six (6) hours as needed for Cough for up to 99 days. Max Daily Amount: 20 mL.  pantoprazole (PROTONIX) 40 mg tablet Take 1 Tablet by mouth daily.  LORazepam (ATIVAN) 1 mg tablet Take 0.5-1 Tablets by mouth every eight (8) hours as needed for Anxiety.  ranolazine ER (Ranexa) 500 mg SR tablet Take 1 Tablet by mouth two (2) times a day.  rosuvastatin (CRESTOR) 20 mg tablet Take 1 Tablet by mouth nightly.  aspirin delayed-release 81 mg tablet Take 1 Tab by mouth daily.  cetirizine (ZYRTEC) 10 mg tablet Take 10 mg by mouth daily.     Cholecalciferol, Vitamin D3, (VITAMIN D3) 1,000 unit Cap Take 1 Cap by mouth daily. No current facility-administered medications for this visit. Vitals:    03/17/22 1105   BP: 128/74   Pulse: 67   Resp: 18   SpO2: 97%   Weight: 180 lb 14.4 oz (82.1 kg)   Height: 5' 1\" (1.549 m)       I have reviewed the nurses notes, vitals, problem list, allergy list, medical history, family, social history and medications. Review of Symptoms:    General: Pt denies excessive weight gain or loss. Pt is able to conduct ADL's  HEENT: Denies blurred vision, headaches, hearing loss, epistaxis and difficulty swallowing. Respiratory: +sob, solorio, Denies cough, congestion,wheezing or stridor. Cardiovascular: Denies precordial pain, palpitations, edema or PND  Gastrointestinal: Denies poor appetite, indigestion, abdominal pain or blood in stool  Genitourinary: Denies hematuria, dysuria, increased urinary frequency  Musculoskeletal: Denies joint pain or swelling from muscles or joints  Neurologic: Denies tremor, paresthesias, headache, or sensory motor disturbance  Psychiatric: Denies confusion, insomnia, depression  Integumentray: Denies rash, itching or ulcers. Hematologic: Denies easy bruising, bleeding    Physical Exam:      General: Well developed, in no acute distress. HEENT: Eyes - PERRL, no jvd  Heart:  Normal S1/S2 negative S3 or S4. Regular, no murmur, gallop or rub. Respiratory: Clear bilaterally x 4, no wheezing or rales  Abdomen:   Soft, non-tender, bowel sounds are active. Extremities:  No edema, normal cap refill, no cyanosis. Musculoskeletal: No clubbing  Neuro: A&Ox3, speech clear, gait stable. Skin: Skin color is normal. No rashes or lesions.  Non diaphoretic, no ulcers or subcutaneous nodule  Vascular: 2+ pulses symmetric in all extremities  Psych - judgement intact and orientation is wnl     Cardiographics    Ekg: v paced    Echo - lvef 35-40    Results for orders placed or performed during the hospital encounter of 05/19/20   EKG, 12 LEAD, INITIAL   Result Value Ref Range    Ventricular Rate 132 BPM    Atrial Rate 133 BPM    QRS Duration 144 ms    Q-T Interval 372 ms    QTC Calculation (Bezet) 551 ms    Calculated R Axis -65 degrees    Calculated T Axis 86 degrees    Diagnosis       Wide QRS tachycardia  Left axis deviation  Left bundle branch block    Confirmed by Tyler Lopez ([id number]) on 5/20/2020 9:13:03 AM           Lab Results   Component Value Date/Time    WBC 8.8 11/30/2021 02:13 PM    HGB 13.7 11/30/2021 02:13 PM    HCT 42.1 11/30/2021 02:13 PM    PLATELET 861 72/12/3988 02:13 PM    MCV 92 11/30/2021 02:13 PM      Lab Results   Component Value Date/Time    Sodium 137 02/18/2022 10:17 AM    Sodium 138 02/18/2022 10:17 AM    Potassium 4.5 02/18/2022 10:17 AM    Potassium 4.5 02/18/2022 10:17 AM    Chloride 109 (H) 02/18/2022 10:17 AM    Chloride 108 02/18/2022 10:17 AM    CO2 23 02/18/2022 10:17 AM    CO2 21 02/18/2022 10:17 AM    Anion gap 5 02/18/2022 10:17 AM    Anion gap 9 02/18/2022 10:17 AM    Glucose 124 (H) 02/18/2022 10:17 AM    Glucose 124 (H) 02/18/2022 10:17 AM    BUN 17 02/18/2022 10:17 AM    BUN 18 02/18/2022 10:17 AM    Creatinine 1.05 (H) 02/18/2022 10:17 AM    Creatinine 1.05 (H) 02/18/2022 10:17 AM    BUN/Creatinine ratio 16 02/18/2022 10:17 AM    BUN/Creatinine ratio 17 02/18/2022 10:17 AM    GFR est AA >60 02/18/2022 10:17 AM    GFR est AA >60 02/18/2022 10:17 AM    GFR est non-AA 50 (L) 02/18/2022 10:17 AM    GFR est non-AA 50 (L) 02/18/2022 10:17 AM    Calcium 9.7 02/18/2022 10:17 AM    Calcium 9.9 02/18/2022 10:17 AM    Bilirubin, total 0.5 02/18/2022 10:17 AM    Bilirubin, total 0.5 02/18/2022 10:17 AM    Alk. phosphatase 93 02/18/2022 10:17 AM    Alk.  phosphatase 95 02/18/2022 10:17 AM    Protein, total 6.5 02/18/2022 10:17 AM    Protein, total 6.6 02/18/2022 10:17 AM    Albumin 3.8 02/18/2022 10:17 AM    Albumin 3.8 02/18/2022 10:17 AM    Globulin 2.7 02/18/2022 10:17 AM    Globulin 2.8 02/18/2022 10:17 AM    A-G Ratio 1.4 02/18/2022 10:17 AM    A-G Ratio 1.4 02/18/2022 10:17 AM    ALT (SGPT) 16 02/18/2022 10:17 AM    ALT (SGPT) 15 02/18/2022 10:17 AM         Assessment:     Assessment:        ICD-10-CM ICD-9-CM    1. Sick sinus syndrome (HCC)  I49.5 427.81 AMB POC EKG ROUTINE W/ 12 LEADS, INTER & REP      CBC WITH AUTOMATED DIFF      PROTHROMBIN TIME + INR      METABOLIC PANEL, COMPREHENSIVE      XR CHEST PA LAT   2. Mixed hyperlipidemia  E78.2 272.2 CBC WITH AUTOMATED DIFF      PROTHROMBIN TIME + INR      METABOLIC PANEL, COMPREHENSIVE      XR CHEST PA LAT   3. Dilated cardiomyopathy (HCC)  I42.0 425.4 CBC WITH AUTOMATED DIFF      PROTHROMBIN TIME + INR      METABOLIC PANEL, COMPREHENSIVE      XR CHEST PA LAT   4. Chronic systolic congestive heart failure (HCC)  I50.22 428.22 CBC WITH AUTOMATED DIFF     428.0 PROTHROMBIN TIME + INR      METABOLIC PANEL, COMPREHENSIVE      XR CHEST PA LAT   5. CHB (complete heart block) (HCC)  I44.2 426.0 CBC WITH AUTOMATED DIFF      PROTHROMBIN TIME + INR      METABOLIC PANEL, COMPREHENSIVE      XR CHEST PA LAT     Orders Placed This Encounter    XR CHEST PA LAT     Standing Status:   Future     Standing Expiration Date:   4/17/2023     Order Specific Question:   Reason for Exam     Answer:   pre op    CBC WITH AUTOMATED DIFF     Standing Status:   Future     Standing Expiration Date:   9/17/2022    PROTHROMBIN TIME + INR     Standing Status:   Future     Standing Expiration Date:   6/27/9622    METABOLIC PANEL, COMPREHENSIVE     Standing Status:   Future     Standing Expiration Date:   9/17/2022    AMB POC EKG ROUTINE W/ 12 LEADS, INTER & REP     Order Specific Question:   Reason for Exam:     Answer:   ROUTINE        Plan:   Opal Shepherd is having class III CHF and her cardiomyopathy has worsened to lvef 35-40 on GDMT.  She is 100% v paced and a candidate for an upgrade to a biv ppm. I discussed the risks/benefits/alternatives of the procedure with the patient. Risks include (but are not limited to) bleeding, heart block, infection, cva/mi/tamponade/death. The patient understands and agrees to proceed. Thank you for this interesting consultation. Thank you for allowing me to participate in Norm Causey 's care.     Alba Baumgarten, MD, Ysabel Reyes

## 2022-03-18 ENCOUNTER — ANESTHESIA (OUTPATIENT)
Dept: CARDIAC CATH/INVASIVE PROCEDURES | Age: 82
End: 2022-03-18
Payer: MEDICARE

## 2022-03-18 ENCOUNTER — APPOINTMENT (OUTPATIENT)
Dept: GENERAL RADIOLOGY | Age: 82
End: 2022-03-18
Attending: INTERNAL MEDICINE
Payer: MEDICARE

## 2022-03-18 ENCOUNTER — HOSPITAL ENCOUNTER (OUTPATIENT)
Age: 82
Discharge: HOME | End: 2022-03-18
Attending: INTERNAL MEDICINE | Admitting: INTERNAL MEDICINE
Payer: MEDICARE

## 2022-03-18 VITALS
DIASTOLIC BLOOD PRESSURE: 45 MMHG | HEIGHT: 61 IN | TEMPERATURE: 97.7 F | SYSTOLIC BLOOD PRESSURE: 105 MMHG | BODY MASS INDEX: 33.79 KG/M2 | WEIGHT: 179 LBS | HEART RATE: 89 BPM | OXYGEN SATURATION: 95 % | RESPIRATION RATE: 14 BRPM

## 2022-03-18 DIAGNOSIS — I42.0 DILATED CARDIOMYOPATHY (HCC): Chronic | ICD-10-CM

## 2022-03-18 DIAGNOSIS — I50.22 CHRONIC SYSTOLIC CONGESTIVE HEART FAILURE (HCC): Chronic | ICD-10-CM

## 2022-03-18 DIAGNOSIS — I42.9 CARDIOMYOPATHY, UNSPECIFIED TYPE (HCC): ICD-10-CM

## 2022-03-18 DIAGNOSIS — I44.2 CHB (COMPLETE HEART BLOCK) (HCC): ICD-10-CM

## 2022-03-18 PROBLEM — R73.02 IGT (IMPAIRED GLUCOSE TOLERANCE): Status: ACTIVE | Noted: 2017-03-28

## 2022-03-18 PROCEDURE — C2621 PMKR, OTHER THAN SING/DUAL: HCPCS | Performed by: INTERNAL MEDICINE

## 2022-03-18 PROCEDURE — 76060000033 HC ANESTHESIA 1 TO 1.5 HR: Performed by: INTERNAL MEDICINE

## 2022-03-18 PROCEDURE — 33208 INSRT HEART PM ATRIAL & VENT: CPT | Performed by: INTERNAL MEDICINE

## 2022-03-18 PROCEDURE — 74011000250 HC RX REV CODE- 250: Performed by: INTERNAL MEDICINE

## 2022-03-18 PROCEDURE — 77030022704 HC SUT VLOC COVD -B: Performed by: INTERNAL MEDICINE

## 2022-03-18 PROCEDURE — 77030018729 HC ELECTRD DEFIB PAD CARD -B: Performed by: INTERNAL MEDICINE

## 2022-03-18 PROCEDURE — C1894 INTRO/SHEATH, NON-LASER: HCPCS | Performed by: INTERNAL MEDICINE

## 2022-03-18 PROCEDURE — C1893 INTRO/SHEATH, FIXED,NON-PEEL: HCPCS | Performed by: INTERNAL MEDICINE

## 2022-03-18 PROCEDURE — 77030002996 HC SUT SLK J&J -A: Performed by: INTERNAL MEDICINE

## 2022-03-18 PROCEDURE — 74011000636 HC RX REV CODE- 636: Performed by: INTERNAL MEDICINE

## 2022-03-18 PROCEDURE — 33229 REMV&REPLC PM GEN MULT LEADS: CPT | Performed by: INTERNAL MEDICINE

## 2022-03-18 PROCEDURE — C1887 CATHETER, GUIDING: HCPCS | Performed by: INTERNAL MEDICINE

## 2022-03-18 PROCEDURE — C1751 CATH, INF, PER/CENT/MIDLINE: HCPCS | Performed by: INTERNAL MEDICINE

## 2022-03-18 PROCEDURE — C1781 MESH (IMPLANTABLE): HCPCS | Performed by: INTERNAL MEDICINE

## 2022-03-18 PROCEDURE — 71045 X-RAY EXAM CHEST 1 VIEW: CPT

## 2022-03-18 PROCEDURE — 77030028698 HC BLD TISS PLSM MEDT -D: Performed by: INTERNAL MEDICINE

## 2022-03-18 PROCEDURE — 74011250636 HC RX REV CODE- 250/636: Performed by: INTERNAL MEDICINE

## 2022-03-18 PROCEDURE — 74011000250 HC RX REV CODE- 250: Performed by: NURSE ANESTHETIST, CERTIFIED REGISTERED

## 2022-03-18 PROCEDURE — C1900 LEAD, CORONARY VENOUS: HCPCS | Performed by: INTERNAL MEDICINE

## 2022-03-18 PROCEDURE — C1769 GUIDE WIRE: HCPCS | Performed by: INTERNAL MEDICINE

## 2022-03-18 PROCEDURE — 77030033819 HC SELCT VEIN WORLEY MRTM -C: Performed by: INTERNAL MEDICINE

## 2022-03-18 PROCEDURE — 74011000272 HC RX REV CODE- 272: Performed by: INTERNAL MEDICINE

## 2022-03-18 PROCEDURE — 74011250636 HC RX REV CODE- 250/636: Performed by: NURSE ANESTHETIST, CERTIFIED REGISTERED

## 2022-03-18 PROCEDURE — 33225 L VENTRIC PACING LEAD ADD-ON: CPT | Performed by: INTERNAL MEDICINE

## 2022-03-18 PROCEDURE — 2709999900 HC NON-CHARGEABLE SUPPLY: Performed by: INTERNAL MEDICINE

## 2022-03-18 PROCEDURE — 74011000258 HC RX REV CODE- 258: Performed by: NURSE ANESTHETIST, CERTIFIED REGISTERED

## 2022-03-18 PROCEDURE — 77030037400 HC ADH TISS HI VISC EXOFIN CHMP -B: Performed by: INTERNAL MEDICINE

## 2022-03-18 DEVICE — ENVELOPE CMRM6133 ABSORB LRG MR
Type: IMPLANTABLE DEVICE | Status: FUNCTIONAL
Brand: TYRX™

## 2022-03-18 DEVICE — PACE/SENSE LEAD
Type: IMPLANTABLE DEVICE | Status: FUNCTIONAL
Brand: ACUITY™ X4 SPIRAL L

## 2022-03-18 DEVICE — CARDIAC RESYNCHRONIZATION THERAPY PACEMAKER
Type: IMPLANTABLE DEVICE | Status: FUNCTIONAL
Brand: VISIONIST™ X4 CRT-P

## 2022-03-18 RX ORDER — HEPARIN SODIUM 200 [USP'U]/100ML
INJECTION, SOLUTION INTRAVENOUS
Status: COMPLETED | OUTPATIENT
Start: 2022-03-18 | End: 2022-03-18

## 2022-03-18 RX ORDER — NALOXONE HYDROCHLORIDE 0.4 MG/ML
0.4 INJECTION, SOLUTION INTRAMUSCULAR; INTRAVENOUS; SUBCUTANEOUS AS NEEDED
Status: DISCONTINUED | OUTPATIENT
Start: 2022-03-18 | End: 2022-03-18 | Stop reason: HOSPADM

## 2022-03-18 RX ORDER — DEXMEDETOMIDINE HYDROCHLORIDE 100 UG/ML
INJECTION, SOLUTION INTRAVENOUS AS NEEDED
Status: DISCONTINUED | OUTPATIENT
Start: 2022-03-18 | End: 2022-03-18 | Stop reason: HOSPADM

## 2022-03-18 RX ORDER — ONDANSETRON 2 MG/ML
INJECTION INTRAMUSCULAR; INTRAVENOUS AS NEEDED
Status: DISCONTINUED | OUTPATIENT
Start: 2022-03-18 | End: 2022-03-18 | Stop reason: HOSPADM

## 2022-03-18 RX ORDER — PROPOFOL 10 MG/ML
INJECTION, EMULSION INTRAVENOUS
Status: DISCONTINUED | OUTPATIENT
Start: 2022-03-18 | End: 2022-03-18 | Stop reason: HOSPADM

## 2022-03-18 RX ORDER — FENTANYL CITRATE 50 UG/ML
INJECTION, SOLUTION INTRAMUSCULAR; INTRAVENOUS AS NEEDED
Status: DISCONTINUED | OUTPATIENT
Start: 2022-03-18 | End: 2022-03-18 | Stop reason: HOSPADM

## 2022-03-18 RX ORDER — SODIUM CHLORIDE 9 MG/ML
INJECTION, SOLUTION INTRAVENOUS
Status: DISCONTINUED | OUTPATIENT
Start: 2022-03-18 | End: 2022-03-18 | Stop reason: HOSPADM

## 2022-03-18 RX ORDER — GLYCOPYRROLATE 0.2 MG/ML
INJECTION INTRAMUSCULAR; INTRAVENOUS AS NEEDED
Status: DISCONTINUED | OUTPATIENT
Start: 2022-03-18 | End: 2022-03-18 | Stop reason: HOSPADM

## 2022-03-18 RX ORDER — HYDROCODONE BITARTRATE AND ACETAMINOPHEN 5; 325 MG/1; MG/1
1 TABLET ORAL
Status: DISCONTINUED | OUTPATIENT
Start: 2022-03-18 | End: 2022-03-18 | Stop reason: HOSPADM

## 2022-03-18 RX ORDER — LIDOCAINE HYDROCHLORIDE 20 MG/ML
INJECTION, SOLUTION EPIDURAL; INFILTRATION; INTRACAUDAL; PERINEURAL AS NEEDED
Status: DISCONTINUED | OUTPATIENT
Start: 2022-03-18 | End: 2022-03-18 | Stop reason: HOSPADM

## 2022-03-18 RX ORDER — PHENYLEPHRINE HCL IN 0.9% NACL 0.4MG/10ML
SYRINGE (ML) INTRAVENOUS AS NEEDED
Status: DISCONTINUED | OUTPATIENT
Start: 2022-03-18 | End: 2022-03-18 | Stop reason: HOSPADM

## 2022-03-18 RX ORDER — ACETAMINOPHEN 325 MG/1
650 TABLET ORAL
Status: DISCONTINUED | OUTPATIENT
Start: 2022-03-18 | End: 2022-03-18 | Stop reason: HOSPADM

## 2022-03-18 RX ORDER — SODIUM CHLORIDE 0.9 % (FLUSH) 0.9 %
5-40 SYRINGE (ML) INJECTION EVERY 8 HOURS
Status: DISCONTINUED | OUTPATIENT
Start: 2022-03-18 | End: 2022-03-18 | Stop reason: HOSPADM

## 2022-03-18 RX ORDER — PROPOFOL 10 MG/ML
INJECTION, EMULSION INTRAVENOUS AS NEEDED
Status: DISCONTINUED | OUTPATIENT
Start: 2022-03-18 | End: 2022-03-18 | Stop reason: HOSPADM

## 2022-03-18 RX ORDER — EPHEDRINE SULFATE/0.9% NACL/PF 50 MG/5 ML
SYRINGE (ML) INTRAVENOUS AS NEEDED
Status: DISCONTINUED | OUTPATIENT
Start: 2022-03-18 | End: 2022-03-18 | Stop reason: HOSPADM

## 2022-03-18 RX ORDER — LIDOCAINE HYDROCHLORIDE 10 MG/ML
INJECTION INFILTRATION; PERINEURAL AS NEEDED
Status: DISCONTINUED | OUTPATIENT
Start: 2022-03-18 | End: 2022-03-18 | Stop reason: HOSPADM

## 2022-03-18 RX ORDER — SODIUM CHLORIDE 0.9 % (FLUSH) 0.9 %
5-40 SYRINGE (ML) INJECTION AS NEEDED
Status: DISCONTINUED | OUTPATIENT
Start: 2022-03-18 | End: 2022-03-18 | Stop reason: HOSPADM

## 2022-03-18 RX ADMIN — DEXMEDETOMIDINE HYDROCHLORIDE 10 MCG: 100 INJECTION, SOLUTION, CONCENTRATE INTRAVENOUS at 11:30

## 2022-03-18 RX ADMIN — LIDOCAINE HYDROCHLORIDE 40 MG: 20 INJECTION, SOLUTION INTRAVENOUS at 11:28

## 2022-03-18 RX ADMIN — Medication 120 MCG: at 12:05

## 2022-03-18 RX ADMIN — VANCOMYCIN HYDROCHLORIDE 1 G: 10 INJECTION, POWDER, LYOPHILIZED, FOR SOLUTION INTRAVENOUS at 11:30

## 2022-03-18 RX ADMIN — Medication 80 MCG: at 12:22

## 2022-03-18 RX ADMIN — FENTANYL CITRATE 50 MCG: 50 INJECTION, SOLUTION INTRAMUSCULAR; INTRAVENOUS at 12:10

## 2022-03-18 RX ADMIN — Medication 10 MG: at 11:41

## 2022-03-18 RX ADMIN — FENTANYL CITRATE 25 MCG: 50 INJECTION, SOLUTION INTRAMUSCULAR; INTRAVENOUS at 11:52

## 2022-03-18 RX ADMIN — Medication 10 MG: at 11:43

## 2022-03-18 RX ADMIN — ONDANSETRON HYDROCHLORIDE 4 MG: 2 INJECTION, SOLUTION INTRAMUSCULAR; INTRAVENOUS at 12:25

## 2022-03-18 RX ADMIN — Medication 10 MG: at 11:54

## 2022-03-18 RX ADMIN — Medication 80 MCG: at 12:20

## 2022-03-18 RX ADMIN — SODIUM CHLORIDE: 9 INJECTION, SOLUTION INTRAVENOUS at 10:29

## 2022-03-18 RX ADMIN — PROPOFOL 50 MCG/KG/MIN: 10 INJECTION, EMULSION INTRAVENOUS at 11:28

## 2022-03-18 RX ADMIN — FENTANYL CITRATE 25 MCG: 50 INJECTION, SOLUTION INTRAMUSCULAR; INTRAVENOUS at 12:01

## 2022-03-18 RX ADMIN — GLYCOPYRROLATE 0.2 MG: 0.2 INJECTION, SOLUTION INTRAMUSCULAR; INTRAVENOUS at 11:25

## 2022-03-18 RX ADMIN — Medication 80 MCG: at 12:18

## 2022-03-18 RX ADMIN — DEXMEDETOMIDINE HYDROCHLORIDE 10 MCG: 100 INJECTION, SOLUTION, CONCENTRATE INTRAVENOUS at 11:34

## 2022-03-18 RX ADMIN — PROPOFOL 20 MG: 10 INJECTION, EMULSION INTRAVENOUS at 11:51

## 2022-03-18 NOTE — ANESTHESIA PREPROCEDURE EVALUATION
Relevant Problems   CARDIOVASCULAR   (+) Coronary artery disease involving native coronary artery of native heart without angina pectoris      GASTROINTESTINAL   (+) GERD (gastroesophageal reflux disease)       Anesthetic History   No history of anesthetic complications            Review of Systems / Medical History  Patient summary reviewed and pertinent labs reviewed    Pulmonary  Within defined limits                 Neuro/Psych   Within defined limits           Cardiovascular            Dysrhythmias   Pacemaker, CAD and hyperlipidemia    Exercise tolerance: <4 METS  Comments: LHC  · Moderate CAD. no sifnificant change from 5/20. · Mild LV dysfunction. TTE 2022  Left Ventricle: Left ventricle is mildly dilated. Normal wall thickness. Mild global hypokinesis present. Moderately reduced left ventricular systolic function with a visually estimated EF of 35 - 40%. Grade I diastolic dysfunction with normal LAP.   Right Ventricle: Reduced systolic function.   Mitral Valve: Mild to moderate transvalvular regurgitation.   Left Atrium: Left atrium is mildly dilated. Left atrial volume index is mildly increased (35-41 mL/m2).     Stress test  · Baseline ECG: Sinus rhythm, left bundle branch [id number] degree AV block. · Gated SPECT: Left ventricular function post-stress was normal. Calculated ejection fraction is 55%. There is no evidence of transient ischemic dilation (TID). · Left ventricular perfusion is abnormal.  · Myocardial perfusion imaging defect 1: There is a defect that is large in size with a severe reduction in uptake present in the mid-apical anteroseptal, inferoseptal, septal and apex location(s) that is non-reversible. There is abnormal wall motion in the defect area. The defect appears to probably be infarction. Perfusion defect was visually and quantitatively present. · Abnormal myocardial perfusion imaging. Fixed defect consistent with prior myocardial infarction.          GI/Hepatic/Renal GERD: well controlled           Endo/Other        Arthritis     Other Findings            Physical Exam    Airway  Mallampati: III  TM Distance: > 6 cm  Neck ROM: normal range of motion   Mouth opening: Normal     Cardiovascular  Regular rate and rhythm,  S1 and S2 normal,  no murmur, click, rub, or gallop  Rhythm: regular  Rate: normal         Dental    Dentition: Implants  Comments: Denies loose teeth   Pulmonary  Breath sounds clear to auscultation               Abdominal  GI exam deferred       Other Findings            Anesthetic Plan    ASA: 3  Anesthesia type: MAC          Induction: Intravenous  Anesthetic plan and risks discussed with: Patient

## 2022-03-18 NOTE — PROGRESS NOTES
Ambulate in [place] with pt to BR. Gait steady. Left anterior chest wall site dry and intact. Pt denies c/o. DC instructions reviewed with pt and daughter. Both verbalize understanding. SL dc'd without difficulty. Pt wheeled to front door to be driven home by daughter.

## 2022-03-18 NOTE — ANESTHESIA POSTPROCEDURE EVALUATION
Procedure(s):  INSERT PPM BIV MULTI. MAC    Anesthesia Post Evaluation      Multimodal analgesia: multimodal analgesia used between 6 hours prior to anesthesia start to PACU discharge  Patient location during evaluation: bedside  Patient participation: complete - patient participated  Level of consciousness: obtunded/minimal responses  Pain management: adequate  Airway patency: patent  Anesthetic complications: no  Cardiovascular status: acceptable  Respiratory status: acceptable  Hydration status: acceptable  Post anesthesia nausea and vomiting:  controlled  Final Post Anesthesia Temperature Assessment:  Normothermia (36.0-37.5 degrees C)      INITIAL Post-op Vital signs:   Vitals Value Taken Time   /46 03/18/22 1241   Temp 36.5 °C (97.7 °F) 03/18/22 1241   Pulse 88 03/18/22 1244   Resp 14 03/18/22 1244   SpO2 93 % 03/18/22 1244   Vitals shown include unvalidated device data.

## 2022-03-18 NOTE — DISCHARGE INSTRUCTIONS
[address] [place] [address] [address] [facility]  [phone]        NEW PACEMAKER IMPLANT DISCHARGE INSTRUCTIONS    Patient ID:  Nate Sultana  [id number]  80 y.o.  1940    Admit Date: 3/18/2022    Discharge Date: 3/18/2022     Admitting Physician: Walter Feldman MD     Discharge Physician: Walter Feldman MD    Admission Diagnoses:   Cardiomyopathy, unspecified type ([address] [I42.9]  Dilated cardiomyopathy ([address] [I42.0]    Discharge Diagnoses: Active Problems:    CHB (complete heart block) ([facility]) (5/20/2020)      Dilated cardiomyopathy ([address] (3/18/2022)      Chronic systolic congestive heart failure ([address] (3/18/2022)        Discharge Condition: Good    Cardiology Procedures this Admission:  Pacemaker insertion. Disposition: home    Reference discharge instructions provided by nursing for diet and activity. Follow-up with device clinic in three weeks. Call [phone] to make an appointment. Signed:  Walter Feldman MD  3/18/2022  11:39 AM      DISCHARGE INSTRUCTIONS FOR PATIENTS WITH PACEMAKERS    1. Remember to call for an appointment for 3 weeks [phone] to check healing and implant programming. 2. Medic Alert Bracelets are available from your pharmacist to wear at all times if you choose to wear one. 3. Carry your ID card for pacemaker with you at all times. This card will be given to you in the hospital or mailed to you. 4. The pacemaker will bulge slightly under your skin. The bulge will decrease in size over the next few weeks. Please notify the doctor's office if you notice any of the following around your site:   A.  A bruise that does not go away. B.  Soreness or yellow, green, or brown drainage from the site. C. Any swelling from the site. D. If you have a fever of 100 degrees or higher that lasts for a few days. INCISION CARE       1.  Leave the dressing over your site until your follow up visit. 2.  You may not shower until after follow up visit. 3.  For comfort, wear loose fitting clothing. 1. 4.  Ice pack to affected shoulder for first 24 hours, wear your sling for 2 days. 2. 5.  Report any signs of infection, fever, pain, swelling, redness, oozing, or heat at site especially if these symptoms increase after the first 3 to 4 days. ACTIVITY PRECAUTIONS     1. Avoid rough contact with the implant site. 2. No driving for 14 days. 3. Avoid lifting your arm over your head, carrying anything on the affected side, or lifting over 10 pounds for 90 days. For the first 2 days only bend your arm at the elbow. 4. Any extreme activity such as golf, weight lifting or exercise biking should be restricted for 60 days. 5. Do not carry objects by holding them against your implant site. 6.  No shooting rifles or any type of gun with the affected shoulder permanently. SPECIAL PRECAUTIONS     1. You should avoid all strong magnetic fields, such as arc welding, large transformers, large motors. 2.  You may or may not (depending on your device) have an MRI which uses a strong magnet to take pictures. 3.  Treatments or surgery that requires diathermy or electrocautery should be discussed with your doctor before scheduled. 4. Avoid radio frequency transmitters, including radar. 5. Advise dentist or other medical personnel you see that you have a pacemaker. 6.  Cell phones and microwave oven use is okay. 7.  If you plan to move or take a trip to a new area, the doctor's office will give you a name of a doctor to contact for any problems. ANTIBIOTIC THERAPY    During the first 8 weeks after your pacemaker insertion, you may need antibiotics before any dental work or certain tests or operations. Let the dentist or doctor who is caring for you know that you have had an implanted device.

## 2022-03-18 NOTE — Clinical Note
TRANSFER - IN REPORT:     Verbal report received from: recovery. Report consisted of patient's Situation, Background, Assessment and   Recommendations(SBAR). Opportunity for questions and clarification was provided. Assessment completed upon patient's arrival to unit and care assumed. Patient transported with a Registered Nurse and [address] [place] [address] / [facility] [employer].

## 2022-03-18 NOTE — Clinical Note
TRANSFER - OUT REPORT:     Verbal report given to: recovery. Report consisted of patient's Situation, Background, Assessment and   Recommendations(SBAR). Opportunity for questions and clarification was provided. Patient transported with a Registered Nurse and [address] [place] [address] / [facility] [employer]. Patient transported to: recovery.

## 2022-03-18 NOTE — INTERVAL H&P NOTE
Update History & Physical    The Patient's History and Physical of March 17, 2022 was reviewed with the patient and I examined the patient. There was no change. The surgical site was confirmed by the patient and me. Plan:  The risk, benefits, expected outcome, and alternative to the recommended procedure have been discussed with the patient. Patient understands and wants to proceed with the procedure.     Electronically signed by Nilton Blackwell MD on 3/18/2022 at 9:23 AM

## 2022-03-19 PROBLEM — Z95.0 S/P PLACEMENT OF CARDIAC PACEMAKER: Status: ACTIVE | Noted: 2020-05-20

## 2022-03-19 PROBLEM — E78.2 MIXED HYPERLIPIDEMIA: Status: ACTIVE | Noted: 2021-08-04

## 2022-03-19 PROBLEM — Z98.890 S/P CARDIAC CATH: Status: ACTIVE | Noted: 2020-05-20

## 2022-03-19 PROBLEM — I25.10 CORONARY ARTERY DISEASE INVOLVING NATIVE CORONARY ARTERY OF NATIVE HEART WITHOUT ANGINA PECTORIS: Status: ACTIVE | Noted: 2021-08-04

## 2022-03-24 PROBLEM — I50.22 CHRONIC SYSTOLIC CONGESTIVE HEART FAILURE (HCC): Status: ACTIVE | Noted: 2022-03-18

## 2022-03-24 PROBLEM — I44.2 CHB (COMPLETE HEART BLOCK) (HCC): Status: ACTIVE | Noted: 2020-05-20

## 2022-03-24 PROBLEM — I42.0 DILATED CARDIOMYOPATHY (HCC): Status: ACTIVE | Noted: 2022-03-18

## 2022-04-04 DIAGNOSIS — Z91.09 ENVIRONMENTAL ALLERGIES: ICD-10-CM

## 2022-04-04 RX ORDER — PROMETHAZINE HYDROCHLORIDE AND CODEINE PHOSPHATE 6.25; 1 MG/5ML; MG/5ML
SOLUTION ORAL
Qty: 180 ML | Refills: 0 | Status: SHIPPED | OUTPATIENT
Start: 2022-04-04 | End: 2022-05-04

## 2022-04-13 ENCOUNTER — OFFICE VISIT (OUTPATIENT)
Dept: CARDIOLOGY CLINIC | Age: 82
End: 2022-04-13
Payer: MEDICARE

## 2022-04-13 DIAGNOSIS — I49.5 SICK SINUS SYNDROME (HCC): ICD-10-CM

## 2022-04-13 DIAGNOSIS — Z95.0 CARDIAC PACEMAKER IN SITU: Primary | ICD-10-CM

## 2022-04-13 DIAGNOSIS — I44.2 CHB (COMPLETE HEART BLOCK) (HCC): ICD-10-CM

## 2022-04-13 PROCEDURE — 93281 PM DEVICE PROGR EVAL MULTI: CPT | Performed by: INTERNAL MEDICINE

## 2022-04-13 NOTE — PROGRESS NOTES
Patient feels well following pacemaker upgrade implantation. Subclavian pacemaker site approximated well, no discharge. No erythema or heat. Device interrogation WNL. Follow up as planned in 3 mo. See report scanned to chart. Chargeable visit.

## 2022-05-20 DIAGNOSIS — J30.89 ENVIRONMENTAL AND SEASONAL ALLERGIES: Primary | ICD-10-CM

## 2022-05-20 RX ORDER — PROMETHAZINE HYDROCHLORIDE AND CODEINE PHOSPHATE 6.25; 1 MG/5ML; MG/5ML
SOLUTION ORAL
Qty: 180 ML | Refills: 0 | Status: SHIPPED | OUTPATIENT
Start: 2022-05-20 | End: 2022-06-19

## 2022-06-30 DIAGNOSIS — R05.3 CHRONIC COUGH: Primary | ICD-10-CM

## 2022-06-30 RX ORDER — PROMETHAZINE HYDROCHLORIDE AND CODEINE PHOSPHATE 6.25; 1 MG/5ML; MG/5ML
SOLUTION ORAL
Qty: 180 ML | Refills: 0 | Status: SHIPPED | OUTPATIENT
Start: 2022-06-30 | End: 2022-07-30

## 2022-09-26 ENCOUNTER — OFFICE VISIT (OUTPATIENT)
Dept: FAMILY MEDICINE CLINIC | Age: 82
End: 2022-09-26
Payer: MEDICARE

## 2022-09-26 VITALS
DIASTOLIC BLOOD PRESSURE: 57 MMHG | HEIGHT: 61 IN | TEMPERATURE: 98.5 F | HEART RATE: 74 BPM | SYSTOLIC BLOOD PRESSURE: 127 MMHG | WEIGHT: 180.6 LBS | OXYGEN SATURATION: 97 % | RESPIRATION RATE: 12 BRPM | BODY MASS INDEX: 34.1 KG/M2

## 2022-09-26 DIAGNOSIS — E78.2 MIXED HYPERLIPIDEMIA: ICD-10-CM

## 2022-09-26 DIAGNOSIS — M15.9 PRIMARY OSTEOARTHRITIS INVOLVING MULTIPLE JOINTS: ICD-10-CM

## 2022-09-26 DIAGNOSIS — I50.22 CHRONIC SYSTOLIC HEART FAILURE (HCC): ICD-10-CM

## 2022-09-26 DIAGNOSIS — Z95.0 CARDIAC PACEMAKER IN SITU: ICD-10-CM

## 2022-09-26 DIAGNOSIS — I25.10 CORONARY ARTERY DISEASE INVOLVING NATIVE CORONARY ARTERY OF NATIVE HEART WITHOUT ANGINA PECTORIS: ICD-10-CM

## 2022-09-26 DIAGNOSIS — Z51.81 ENCOUNTER FOR MEDICATION MONITORING: ICD-10-CM

## 2022-09-26 DIAGNOSIS — K21.9 GASTROESOPHAGEAL REFLUX DISEASE WITHOUT ESOPHAGITIS: ICD-10-CM

## 2022-09-26 DIAGNOSIS — E55.9 HYPOVITAMINOSIS D: ICD-10-CM

## 2022-09-26 DIAGNOSIS — Z00.00 MEDICARE ANNUAL WELLNESS VISIT, SUBSEQUENT: Primary | ICD-10-CM

## 2022-09-26 DIAGNOSIS — F41.9 ANXIETY: ICD-10-CM

## 2022-09-26 DIAGNOSIS — R73.02 IGT (IMPAIRED GLUCOSE TOLERANCE): ICD-10-CM

## 2022-09-26 DIAGNOSIS — E66.9 NON MORBID OBESITY: ICD-10-CM

## 2022-09-26 DIAGNOSIS — I49.5 SSS (SICK SINUS SYNDROME) (HCC): ICD-10-CM

## 2022-09-26 LAB
GLUCOSE POC: 117 MG/DL
HBA1C MFR BLD HPLC: 6.2 %

## 2022-09-26 PROCEDURE — 1090F PRES/ABSN URINE INCON ASSESS: CPT | Performed by: FAMILY MEDICINE

## 2022-09-26 PROCEDURE — G0439 PPPS, SUBSEQ VISIT: HCPCS | Performed by: FAMILY MEDICINE

## 2022-09-26 PROCEDURE — 1124F ACP DISCUSS-NO DSCNMKR DOCD: CPT | Performed by: FAMILY MEDICINE

## 2022-09-26 PROCEDURE — G8536 NO DOC ELDER MAL SCRN: HCPCS | Performed by: FAMILY MEDICINE

## 2022-09-26 PROCEDURE — G8510 SCR DEP NEG, NO PLAN REQD: HCPCS | Performed by: FAMILY MEDICINE

## 2022-09-26 PROCEDURE — G8752 SYS BP LESS 140: HCPCS | Performed by: FAMILY MEDICINE

## 2022-09-26 PROCEDURE — G8417 CALC BMI ABV UP PARAM F/U: HCPCS | Performed by: FAMILY MEDICINE

## 2022-09-26 PROCEDURE — G8399 PT W/DXA RESULTS DOCUMENT: HCPCS | Performed by: FAMILY MEDICINE

## 2022-09-26 PROCEDURE — G0463 HOSPITAL OUTPT CLINIC VISIT: HCPCS | Performed by: FAMILY MEDICINE

## 2022-09-26 PROCEDURE — 83036 HEMOGLOBIN GLYCOSYLATED A1C: CPT | Performed by: FAMILY MEDICINE

## 2022-09-26 PROCEDURE — 1101F PT FALLS ASSESS-DOCD LE1/YR: CPT | Performed by: FAMILY MEDICINE

## 2022-09-26 PROCEDURE — G8754 DIAS BP LESS 90: HCPCS | Performed by: FAMILY MEDICINE

## 2022-09-26 PROCEDURE — G8427 DOCREV CUR MEDS BY ELIG CLIN: HCPCS | Performed by: FAMILY MEDICINE

## 2022-09-26 PROCEDURE — 99214 OFFICE O/P EST MOD 30 MIN: CPT | Performed by: FAMILY MEDICINE

## 2022-09-26 PROCEDURE — 82947 ASSAY GLUCOSE BLOOD QUANT: CPT | Performed by: FAMILY MEDICINE

## 2022-09-26 RX ORDER — LORAZEPAM 1 MG/1
.5-1 TABLET ORAL
Qty: 90 TABLET | Refills: 1 | Status: SHIPPED | OUTPATIENT
Start: 2022-09-26

## 2022-09-26 RX ORDER — SACUBITRIL AND VALSARTAN 49; 51 MG/1; MG/1
1 TABLET, FILM COATED ORAL 2 TIMES DAILY
COMMUNITY

## 2022-09-26 NOTE — PROGRESS NOTES
HISTORY OF PRESENT ILLNESS  Lynder Sever is a 81 y.o. female. HPI  Follow up on chronic medical problems. Cardiovascular Review:  The patient has coronary artery disease and hyperlipidemia. Has dilated cardiomyopathy whach had worsen worsened to lvef 35-40 on GDMT earlier this year. Pt started on entresto and jardiance. Cardiologist is Dr. Francy Whittington. Had cardiac catheterization was 12/2019 and was decided to medically treat the proximal RCA and distal second DIAG lesion. Overall doing well. Had episode of SSS/CHB in May 2020 and had second PM placement earlier this year. Had cardiac Cath at that time May 2020. Similar CAD as prior cath 12/2019. Repeat cardiac Cath 12/2021 d/t increased SOB:  Moderate CAD. no significant change from 5/20. Mild LV dysfunction. Diet and Lifestyle: generally follows a low fat low cholesterol diet, generally follows a low sodium diet, exercises sporadically, we discussed increasing her exercise regimen. Home BP Monitoring: is not measured at home. Pertinent ROS: taking medications as instructed, no medication side effects noted, no TIA's, no chest pain on exertion, no dyspnea on exertion, no swelling of ankles, no palpitations. Hypertriglyceridemia follow up:  Compliant w/ meds, low fat, low cholesterol diet. She is on crestor. Triglyceride are still slight elevated. Discussed adding trico is numbers. Tolerating well. Exercising some. No muscle nor abdominal pain, no skin discoloration. Osteoarthritis:  Patient has osteoarthritis. Has various joint aches but overall doing well. Symptoms onset: problem is longstanding. Rheumatological ROS: stable, mild-to-moderate joint symptoms intermittently, reasonably well controlled by PRN meds. Response to treatment plan: stable and intermittent. Glucose intolerance reveiw:  She has IGT.     Diabetic ROS - further diabetic ROS: no polyuria or polydipsia, no chest pain, dyspnea or TIA's, no numbness, tingling or pain in extremities, no unusual visual symptoms. Lab review: orders written for new lab studies as appropriate; see orders. Patient Active Problem List   Diagnosis Code    Hypertriglyceridemia E78.1    GERD (gastroesophageal reflux disease) K21.9    OA (osteoarthritis) M19.90    Vitamin D deficiency E55.9    Anxiety F41.9    Environmental allergies Z91.09    Encounter for medication monitoring Z51.81    IGT (impaired glucose tolerance) R73.02    CHB (complete heart block) (HCC) I44.2    S/P placement of cardiac pacemaker Z95.0    S/P cardiac cath Z98.890    Mixed hyperlipidemia E78.2    Coronary artery disease involving native coronary artery of native heart without angina pectoris I25.10    Dilated cardiomyopathy ([facility]) I42.0    Chronic systolic congestive heart failure ([facility]) I50.22       Current Outpatient Medications   Medication Sig Dispense Refill    lisinopriL (PRINIVIL, ZESTRIL) 2.5 mg tablet Take 1 Tablet by mouth daily. 90 Tablet 3    metoprolol succinate (TOPROL-XL) 25 mg XL tablet Take 1 Tablet by mouth nightly. 90 Tablet 3    pantoprazole (PROTONIX) 40 mg tablet Take 1 Tablet by mouth daily. 90 Tablet 3    LORazepam (ATIVAN) 1 mg tablet Take 0.5-1 Tablets by mouth every eight (8) hours as needed for Anxiety. 90 Tablet 1    ranolazine ER (Ranexa) 500 mg SR tablet Take 1 Tablet by mouth two (2) times a day. 180 Tablet 1    rosuvastatin (CRESTOR) 20 mg tablet Take 1 Tablet by mouth nightly. 90 Tablet 1    aspirin delayed-release 81 mg tablet Take 1 Tab by mouth daily. 30 Tab 5    cetirizine (ZYRTEC) 10 mg tablet Take 10 mg by mouth daily. Cholecalciferol, Vitamin D3, (VITAMIN D3) 1,000 unit Cap Take 1 Cap by mouth daily.            Allergies   Allergen Reactions    Macrobid [Nitrofurantoin Monohyd/M-Cryst] Rash    Pcn [Penicillins] Hives         Past Medical History:   Diagnosis Date    AV block, 3rd degree ([facility] 5/20/2020    CAD (coronary artery disease)     Cancer ([facility] 07/2021 basal cell- nose    Chronic systolic congestive heart failure ([facility] 3/18/2022    GERD (gastroesophageal reflux disease) 2010    Hypertriglyceridemia 2010    Hypovitaminosis D     Long term current use of anticoagulant therapy     ASA 81 mg    NSVT (nonsustained ventricular tachycardia) ([facility] 2020    OA (osteoarthritis) 2010    Osteoarthritis     Pacemaker     S/P cardiac cath 2020    S/P placement of cardiac pacemaker 2020 Medtronic dual chamber pacemaker implant     Syncope     Vitamin D deficiency 2010         Past Surgical History:   Procedure Laterality Date    HX ACL RECONSTRUCTION      HX CATARACT REMOVAL  2018    right eye    HX HEART CATHETERIZATION      HX HEENT      cervical neck stenosis s/p cervical release    HX HERNIA REPAIR  2714    umbilical    HX HIP REPLACEMENT  2007    right    HX HIP REPLACEMENT  2007    left    HX ORTHOPAEDIC  2008    cervical release    HX SKIN BIOPSY  2021    nose    NC COLONOSCOPY FLX DX W/COLLJ SPEC WHEN PFRMD  2006    NC COLONOSCOPY FLX DX W/COLLJ SPEC WHEN PFRMD  2011    Dr. Ellison Nam INS NEW/RPLCMT PRM PM W/TRANSV ELTRD ATRIAL&VENT N/A 2020    Insert Ppm Dual performed by Pamella Cartwright MD at \[facility]\"" CARDIAC CATH LAB         Family History   Problem Relation Age of Onset    Hypertension Mother     Heart Failure Mother     Cancer Father         colon    Lung Disease Brother     Arthritis-rheumatoid Brother     Heart Disease Brother        Social History     Tobacco Use    Smoking status: Former     Packs/day: 0.25     Years: 55.00     Pack years: 13.75     Types: Cigarettes     Quit date: 2019     Years since quittin.8    Smokeless tobacco: Never   Substance Use Topics    Alcohol use:  Yes     Alcohol/week: 0.0 standard drinks     Comment: occastional        Lab Results   Component Value Date/Time    WBC 7.2 2022 01:37 PM    HGB 13.3 2022 01:37 PM    HCT 43.2 03/17/2022 01:37 PM    PLATELET 462 41/43/3013 01:37 PM    MCV 96.6 03/17/2022 01:37 PM     Lab Results   Component Value Date/Time    Cholesterol, total 162 02/18/2022 10:17 AM    HDL Cholesterol 49 02/18/2022 10:17 AM    LDL, calculated 56.8 02/18/2022 10:17 AM    LDL-C, External 134 06/26/2015 07:42 AM    Triglyceride 281 (H) 02/18/2022 10:17 AM    CHOL/HDL Ratio 3.3 02/18/2022 10:17 AM     Lab Results   Component Value Date/Time    TSH 0.196 (L) 10/02/2019 11:28 AM    T3 Uptake 20 (L) 10/02/2019 11:28 AM    T4, Free 1.15 10/02/2019 11:28 AM    T4, Total 6.3 10/02/2019 11:28 AM      Lab Results   Component Value Date/Time    Sodium 138 03/17/2022 01:37 PM    Potassium 4.5 03/17/2022 01:37 PM    Chloride 107 03/17/2022 01:37 PM    CO2 27 03/17/2022 01:37 PM    Anion gap 4 (L) 03/17/2022 01:37 PM    Glucose 94 03/17/2022 01:37 PM    BUN 19 03/17/2022 01:37 PM    Creatinine 1.11 (H) 03/17/2022 01:37 PM    BUN/Creatinine ratio 17 03/17/2022 01:37 PM    GFR est AA 57 (L) 03/17/2022 01:37 PM    GFR est non-AA 47 (L) 03/17/2022 01:37 PM    Calcium 9.9 03/17/2022 01:37 PM    Bilirubin, total 0.4 03/17/2022 01:37 PM    ALT (SGPT) 19 03/17/2022 01:37 PM    Alk. phosphatase 104 03/17/2022 01:37 PM    Protein, total 7.3 03/17/2022 01:37 PM    Albumin 4.1 03/17/2022 01:37 PM    Globulin 3.2 03/17/2022 01:37 PM    A-G Ratio 1.3 03/17/2022 01:37 PM      Lab Results   Component Value Date/Time    Hemoglobin A1c 6.2 (H) 08/17/2021 11:17 AM    Hemoglobin A1c (POC) 6.0 02/18/2022 10:23 AM    Hemoglobin A1c, External 6.2 06/26/2015 12:00 AM         Review of Systems   Constitutional:  Negative for malaise/fatigue. HENT:  Negative for congestion. Eyes:  Negative for blurred vision. Respiratory:  Negative for cough and shortness of breath. Cardiovascular:  Negative for chest pain, palpitations and leg swelling. Gastrointestinal:  Negative for abdominal pain, constipation and heartburn.    Genitourinary:  Negative for dysuria, frequency and urgency. Musculoskeletal:  Negative for back pain and joint pain. Neurological:  Negative for dizziness, tingling and headaches. Endo/Heme/Allergies:  Negative for environmental allergies. Psychiatric/Behavioral:  Negative for depression. The patient does not have insomnia. Physical Exam  Vitals and nursing note reviewed. Constitutional:       Appearance: Normal appearance. She is well-developed. Comments: BP (!) 127/57 (BP 1 Location: Left upper arm, BP Patient Position: Sitting)   Pulse 74   Temp 98.5 °F (36.9 °C) (Oral)   Resp 12   Ht 5' 1\" (1.549 m)   Wt 180 lb 9.6 oz (81.9 kg)   SpO2 97%   BMI 34.12 kg/m²    HENT:      Right Ear: Tympanic membrane and ear canal normal.      Left Ear: Tympanic membrane and ear canal normal.   Neck:      Thyroid: No thyromegaly. Cardiovascular:      Rate and Rhythm: Normal rate and regular rhythm. Heart sounds: Normal heart sounds. Pulmonary:      Effort: Pulmonary effort is normal.      Breath sounds: Normal breath sounds. Abdominal:      General: Bowel sounds are normal.      Palpations: Abdomen is soft. There is no mass. Tenderness: There is no abdominal tenderness. Musculoskeletal:         General: Normal range of motion. Cervical back: Normal range of motion and neck supple. Right lower leg: No edema. Left lower leg: No edema. Lymphadenopathy:      Cervical: No cervical adenopathy. Skin:     General: Skin is warm and dry. Neurological:      General: No focal deficit present. Mental Status: She is alert and oriented to person, place, and time. Psychiatric:         Mood and Affect: Mood normal.     ASSESSMENT and PLAN  Diagnoses and all orders for this visit:    1. Mixed hyperlipidemia  Continue to monitor. Work on diet and exercise.  -     LIPID PANEL; Future    2. IGT (impaired glucose tolerance)  A1c stable at 6.2. Continue to monitor. Work on diet and exercise.   -     AMB POC HEMOGLOBIN A1C  -     AMB POC GLUCOSE, QUANTITATIVE, BLOOD    3. Coronary artery disease involving native coronary artery of native heart without angina pectoris  4. Chronic systolic heart failure (HCC)  Stable on current regimen    5. SSS (sick sinus syndrome) (HCC)  6. Cardiac pacemaker in situ  Stable     7. Primary osteoarthritis involving multiple joints  Stable     8. Gastroesophageal reflux disease without esophagitis  Stable on protonix    9. Hypovitaminosis D  -     VITAMIN D, 25 HYDROXY; Future    10. Anxiety  -     refill LORazepam (ATIVAN) 1 mg tablet; Take 0.5-1 Tablets by mouth every eight (8) hours as needed for Anxiety. 11. Encounter for medication monitoring  -     METABOLIC PANEL, COMPREHENSIVE; Future  -     MAGNESIUM; Future    12. Non morbid obesity  I have reviewed/discussed the above normal BMI with the patient. I have recommended the following interventions: dietary management education, guidance, and counseling . Follow-up and Dispositions    Return in about 6 months (around 3/26/2023). reviewed diet, exercise and weight control  cardiovascular risk and specific lipid/LDL goals reviewed  reviewed medications and side effects in detail  specific diabetic recommendations: low cholesterol diet, weight control and daily exercise discussed and glycohemoglobin and other lab monitoring discussed    I have discussed diagnosis listed in this note with pt and/or family. I have discussed treatment plans and options and the risk/benefit analysis of those options, including safe use of medications and possible medication side effects. Through the use of shared decision making we have agreed to the above plan. The patient has received an after-visit summary and questions were answered concerning future plans and follow up. Advise pt of any urgent changes then to proceed to the ER.

## 2022-09-26 NOTE — PROGRESS NOTES
This is a Subsequent Medicare Annual Wellness Exam (AWV) (Performed 12 months after IPPE or effective date of Medicare Part B enrollment)    I have reviewed the patient's medical history in detail and updated the computerized patient record. History     Patient Active Problem List   Diagnosis Code    Hypertriglyceridemia E78.1    GERD (gastroesophageal reflux disease) K21.9    OA (osteoarthritis) M19.90    Vitamin D deficiency E55.9    Anxiety F41.9    Environmental allergies Z91.09    Encounter for medication monitoring Z51.81    IGT (impaired glucose tolerance) R73.02    CHB (complete heart block) ([facility]) I44.2    S/P placement of cardiac pacemaker Z95.0    S/P cardiac cath Z98.890    Mixed hyperlipidemia E78.2    Coronary artery disease involving native coronary artery of native heart without angina pectoris I25.10    Dilated cardiomyopathy ([facility]) I42.0    Chronic systolic congestive heart failure ([facility]) I50.22       Current Outpatient Medications   Medication Sig Dispense Refill    lisinopriL (PRINIVIL, ZESTRIL) 2.5 mg tablet Take 1 Tablet by mouth daily. 90 Tablet 3    metoprolol succinate (TOPROL-XL) 25 mg XL tablet Take 1 Tablet by mouth nightly. 90 Tablet 3    pantoprazole (PROTONIX) 40 mg tablet Take 1 Tablet by mouth daily. 90 Tablet 3    LORazepam (ATIVAN) 1 mg tablet Take 0.5-1 Tablets by mouth every eight (8) hours as needed for Anxiety. 90 Tablet 1    ranolazine ER (Ranexa) 500 mg SR tablet Take 1 Tablet by mouth two (2) times a day. 180 Tablet 1    rosuvastatin (CRESTOR) 20 mg tablet Take 1 Tablet by mouth nightly. 90 Tablet 1    aspirin delayed-release 81 mg tablet Take 1 Tab by mouth daily. 30 Tab 5    cetirizine (ZYRTEC) 10 mg tablet Take 10 mg by mouth daily. Cholecalciferol, Vitamin D3, (VITAMIN D3) 1,000 unit Cap Take 1 Cap by mouth daily.            Allergies   Allergen Reactions    Macrobid [Nitrofurantoin Monohyd/M-Cryst] Rash    Pcn [Penicillins] Hives         Past Medical History: Diagnosis Date    AV block, 3rd degree ([facility] 2020    CAD (coronary artery disease)     Cancer ([facility] 2021    basal cell- nose    Chronic systolic congestive heart failure ([facility] 3/18/2022    GERD (gastroesophageal reflux disease) 2010    Hypertriglyceridemia 2010    Hypovitaminosis D     Long term current use of anticoagulant therapy     ASA 81 mg    NSVT (nonsustained ventricular tachycardia) ([facility] 2020    OA (osteoarthritis) 2010    Osteoarthritis     Pacemaker     S/P cardiac cath 2020    S/P placement of cardiac pacemaker 2020 Medtronic dual chamber pacemaker implant     Syncope     Vitamin D deficiency 2010         Past Surgical History:   Procedure Laterality Date    HX ACL RECONSTRUCTION      HX CATARACT REMOVAL  2018    right eye    HX HEART CATHETERIZATION      HX HEENT      cervical neck stenosis s/p cervical release    HX HERNIA REPAIR  1566    umbilical    HX HIP REPLACEMENT  2007    right    HX HIP REPLACEMENT  2007    left    HX ORTHOPAEDIC  2008    cervical release    HX SKIN BIOPSY  2021    nose    DE COLONOSCOPY FLX DX W/COLLJ SPEC WHEN PFRMD  2006    DE COLONOSCOPY FLX DX W/COLLJ SPEC WHEN PFRMD  2011    Dr. Fuchs Mouse INS NEW/RPLCMT PRM PM W/TRANSV ELTRD ATRIAL&VENT N/A 2020    Insert Ppm Dual performed by Ana María Hutton MD at [facility] CARDIAC CATH LAB         Family History   Problem Relation Age of Onset    Hypertension Mother     Heart Failure Mother     Cancer Father         colon    Lung Disease Brother     Arthritis-rheumatoid Brother     Heart Disease Brother        Social History     Tobacco Use    Smoking status: Former     Packs/day: 0.25     Years: 55.00     Pack years: 13.75     Types: Cigarettes     Quit date: 2019     Years since quittin.8    Smokeless tobacco: Never   Substance Use Topics    Alcohol use:  Yes     Alcohol/week: 0.0 standard drinks     Comment: occastional Depression Risk Factor Screening:     PHQ over the last two weeks    Little interest or pleasure in doing things Not at all   Feeling down, depressed or hopeless Not at all   Total Score PHQ 2 0     Alcohol Risk Factor Screening: You do not drink alcohol or very rarely. Functional Ability and Level of Safety:   Hearing Loss  Hearing is good. Activities of Daily Living  The home contains: discussed safety equipment. Patient does total self care    Fall RiskFall Risk Assessment, last 12 mths    Able to walk? Yes   Fall in past 12 months? No     Functional Ability:   Does the patient exhibit a steady gait? yes    How long did it take the patient to get up and walk from a sitting position? seconds    Is the patient self reliant? (ie can do own laundry, meals, household chores)  yes   Does the patient handle his/her own medications? yes   Does the patient handle his/her own money? yes   Is the patients home safe (ie good lighting, handrails on stairs and bath, etc.)? yes   Did you notice or did patient express any hearing difficulties? no   Did you notice or did patient express any vision difficulties? no        Advance Care Planning:   Patient was offered the opportunity to discuss advance care planning:  yes    Does patient have an Advance Directive:  no   If no, did you provide information on Caring Connections? yes      Abuse Screen  Patient is not abused    Cognitive Screening   Evaluation of Cognitive Function:  Has your family/caregiver stated any concerns about your memory: no      Patient Care Team   Patient Care Team:  Dickson Garnica MD as PCP - General    Assessment/Plan   Education and counseling provided:  Are appropriate based on today's review and evaluation  End-of-Life planning (with patient's consent)    ASSESSMENT and PLAN    Medicare Annual Wellness  Continue current treatment plan. Continue annual follow up.       I have discussed diagnosis listed in this note with pt and/or family. I have discussed treatment plans and options and the risk/benefit analysis of those options, including safe use of medications and possible medication side effects. Through the use of shared decision making we have agreed to the above plan. The patient has received an after-visit summary and questions were answered concerning future plans and follow up. Advise pt of any urgent changes then to proceed to the ER.

## 2022-09-26 NOTE — PROGRESS NOTES
Chief Complaint   Patient presents with    Follow-up   1. Have you been to the ER, urgent care clinic since your last visit? Hospitalized since your last visit? No    2. Have you seen or consulted any other health care providers outside of the [address] [place] [address] since your last visit? Include any pap smears or colon screening.  Yes Where: Cardiology

## 2022-09-27 LAB
25(OH)D3 SERPL-MCNC: 42.4 NG/ML (ref 30–100)
ALBUMIN SERPL-MCNC: 4.2 G/DL (ref 3.5–5)
ALBUMIN/GLOB SERPL: 1.5 {RATIO} (ref 1.1–2.2)
ALP SERPL-CCNC: 96 U/L (ref 45–117)
ALT SERPL-CCNC: 20 U/L (ref 12–78)
ANION GAP SERPL CALC-SCNC: 7 MMOL/L (ref 5–15)
AST SERPL-CCNC: 14 U/L (ref 15–37)
BILIRUB SERPL-MCNC: 0.5 MG/DL (ref 0.2–1)
BUN SERPL-MCNC: 16 MG/DL (ref 6–20)
BUN/CREAT SERPL: 13 (ref 12–20)
CALCIUM SERPL-MCNC: 9.8 MG/DL (ref 8.5–10.1)
CHLORIDE SERPL-SCNC: 108 MMOL/L (ref 97–108)
CHOLEST SERPL-MCNC: 148 MG/DL
CO2 SERPL-SCNC: 25 MMOL/L (ref 21–32)
COMMENT, HOLDF: NORMAL
CREAT SERPL-MCNC: 1.23 MG/DL (ref 0.55–1.02)
GLOBULIN SER CALC-MCNC: 2.8 G/DL (ref 2–4)
GLUCOSE SERPL-MCNC: 101 MG/DL (ref 65–100)
HDLC SERPL-MCNC: 49 MG/DL
HDLC SERPL: 3 {RATIO} (ref 0–5)
LDLC SERPL CALC-MCNC: 50.2 MG/DL (ref 0–100)
MAGNESIUM SERPL-MCNC: 2.2 MG/DL (ref 1.6–2.4)
POTASSIUM SERPL-SCNC: 4.5 MMOL/L (ref 3.5–5.1)
PROT SERPL-MCNC: 7 G/DL (ref 6.4–8.2)
SAMPLES BEING HELD,HOLD: NORMAL
SODIUM SERPL-SCNC: 140 MMOL/L (ref 136–145)
TRIGL SERPL-MCNC: 244 MG/DL (ref ?–150)
VLDLC SERPL CALC-MCNC: 48.8 MG/DL

## 2022-10-06 DIAGNOSIS — R05.3 CHRONIC COUGH: Primary | ICD-10-CM

## 2022-10-06 RX ORDER — PROMETHAZINE HYDROCHLORIDE AND CODEINE PHOSPHATE 6.25; 1 MG/5ML; MG/5ML
SOLUTION ORAL
Qty: 180 ML | Refills: 0 | Status: SHIPPED | OUTPATIENT
Start: 2022-10-06 | End: 2022-11-05

## 2023-01-27 ENCOUNTER — TELEPHONE (OUTPATIENT)
Dept: FAMILY MEDICINE CLINIC | Age: 83
End: 2023-01-27

## 2023-01-27 DIAGNOSIS — F41.9 ANXIETY: ICD-10-CM

## 2023-01-27 RX ORDER — LORAZEPAM 1 MG/1
.5-1 TABLET ORAL
Qty: 90 TABLET | Refills: 0 | Status: SHIPPED | OUTPATIENT
Start: 2023-01-27

## 2023-01-27 NOTE — TELEPHONE ENCOUNTER
----- Message from Jackeline Spears sent at 1/27/2023 10:06 AM EST -----  Subject: Refill Request    QUESTIONS  Name of Medication? LORazepam (ATIVAN) 1 mg tablet  Patient-reported dosage and instructions? one 1 mg tablet, tablet   How many days do you have left? 10  [id number]  Pharmacy phone number (if available)? [phone]  ---------------------------------------------------------------------------  --------------  Elif [employer] INFO  What is the best way for the office to contact you? OK to leave message on   voicemail  Preferred Call Back Phone Number? [phone]  ---------------------------------------------------------------------------  --------------  SCRIPT ANSWERS  Relationship to Patient?  Self

## 2023-02-20 DIAGNOSIS — K21.9 GASTROESOPHAGEAL REFLUX DISEASE WITHOUT ESOPHAGITIS: ICD-10-CM

## 2023-02-20 RX ORDER — PANTOPRAZOLE SODIUM 40 MG/1
40 TABLET, DELAYED RELEASE ORAL DAILY
Qty: 90 TABLET | Refills: 3 | Status: SHIPPED | OUTPATIENT
Start: 2023-02-20

## 2023-02-20 NOTE — TELEPHONE ENCOUNTER
Last Visit: 9/26/22 with MD Flash Can  Next Appointment: 3/27/23 with MD Flash Can  Previous Refill Encounter(s): 2/18/22 #90 with 3 refills    Requested Prescriptions     Pending Prescriptions Disp Refills    pantoprazole (PROTONIX) 40 mg tablet 90 Tablet 3     Sig: Take 1 Tablet by mouth daily. For Pharmacy Admin Tracking Only    Program: Medication Refill  CPA in place:   Recommendation Provided To:    Intervention Detail: New Rx: 1, reason: Patient Preference  Intervention Accepted By:   Bria Bolden Closed?:   Time Spent (min): 5

## 2023-03-27 ENCOUNTER — OFFICE VISIT (OUTPATIENT)
Dept: FAMILY MEDICINE CLINIC | Age: 83
End: 2023-03-27
Payer: MEDICARE

## 2023-03-27 VITALS
HEIGHT: 61 IN | HEART RATE: 75 BPM | RESPIRATION RATE: 12 BRPM | SYSTOLIC BLOOD PRESSURE: 117 MMHG | DIASTOLIC BLOOD PRESSURE: 59 MMHG | BODY MASS INDEX: 30.85 KG/M2 | WEIGHT: 163.4 LBS | TEMPERATURE: 98.3 F | OXYGEN SATURATION: 95 %

## 2023-03-27 DIAGNOSIS — I25.10 CORONARY ARTERY DISEASE INVOLVING NATIVE CORONARY ARTERY OF NATIVE HEART WITHOUT ANGINA PECTORIS: ICD-10-CM

## 2023-03-27 DIAGNOSIS — I49.5 SSS (SICK SINUS SYNDROME) (HCC): ICD-10-CM

## 2023-03-27 DIAGNOSIS — E55.9 HYPOVITAMINOSIS D: ICD-10-CM

## 2023-03-27 DIAGNOSIS — R73.02 IGT (IMPAIRED GLUCOSE TOLERANCE): ICD-10-CM

## 2023-03-27 DIAGNOSIS — Z51.81 ENCOUNTER FOR MEDICATION MONITORING: ICD-10-CM

## 2023-03-27 DIAGNOSIS — Z95.0 CARDIAC PACEMAKER IN SITU: ICD-10-CM

## 2023-03-27 DIAGNOSIS — E66.9 NON MORBID OBESITY: ICD-10-CM

## 2023-03-27 DIAGNOSIS — I50.22 CHRONIC SYSTOLIC HEART FAILURE (HCC): ICD-10-CM

## 2023-03-27 DIAGNOSIS — E78.2 MIXED HYPERLIPIDEMIA: Primary | ICD-10-CM

## 2023-03-27 DIAGNOSIS — M15.9 PRIMARY OSTEOARTHRITIS INVOLVING MULTIPLE JOINTS: ICD-10-CM

## 2023-03-27 LAB
GLUCOSE POC: 105 MG/DL
HBA1C MFR BLD HPLC: 5.5 %

## 2023-03-27 PROCEDURE — G0463 HOSPITAL OUTPT CLINIC VISIT: HCPCS | Performed by: FAMILY MEDICINE

## 2023-03-27 PROCEDURE — G8536 NO DOC ELDER MAL SCRN: HCPCS | Performed by: FAMILY MEDICINE

## 2023-03-27 PROCEDURE — G8510 SCR DEP NEG, NO PLAN REQD: HCPCS | Performed by: FAMILY MEDICINE

## 2023-03-27 PROCEDURE — 99214 OFFICE O/P EST MOD 30 MIN: CPT | Performed by: FAMILY MEDICINE

## 2023-03-27 PROCEDURE — 1090F PRES/ABSN URINE INCON ASSESS: CPT | Performed by: FAMILY MEDICINE

## 2023-03-27 PROCEDURE — 1123F ACP DISCUSS/DSCN MKR DOCD: CPT | Performed by: FAMILY MEDICINE

## 2023-03-27 PROCEDURE — 1101F PT FALLS ASSESS-DOCD LE1/YR: CPT | Performed by: FAMILY MEDICINE

## 2023-03-27 PROCEDURE — G8399 PT W/DXA RESULTS DOCUMENT: HCPCS | Performed by: FAMILY MEDICINE

## 2023-03-27 PROCEDURE — 82947 ASSAY GLUCOSE BLOOD QUANT: CPT | Performed by: FAMILY MEDICINE

## 2023-03-27 PROCEDURE — 83036 HEMOGLOBIN GLYCOSYLATED A1C: CPT | Performed by: FAMILY MEDICINE

## 2023-03-27 PROCEDURE — G8427 DOCREV CUR MEDS BY ELIG CLIN: HCPCS | Performed by: FAMILY MEDICINE

## 2023-03-27 PROCEDURE — G8417 CALC BMI ABV UP PARAM F/U: HCPCS | Performed by: FAMILY MEDICINE

## 2023-03-27 NOTE — LETTER
CONTROLLED SUBSTANCE MEDICATION AGREEMENT  Patient Name: Barbara Wade  Patient YOB: 1940     I understand, that controlled substance medications may be used to help better manage my symptoms and to improve my ability to function at home, work and in social settings. However, I also understand that these medications do have risks, which have been discussed with me, including possible development of physical or psychological dependence. I understand that successful treatment requires mutual trust and honesty between me and my provider. I understand and agree that following this Medication Agreement is necessary in continuing my provider-patient relationship and the success of my treatment plan. Explanation from my Provider: Benefits and Goals of Controlled Substance Medications: There are two potential goals for your treatment: (1) decreased pain and suffering (2) improved daily life functions. There are many possible treatments for your chronic condition(s). Alternatives such as physical therapy, yoga, massage, home daily exercise, meditation, relaxation techniques, injections, chiropractic manipulations, surgery, cognitive therapy, hypnosis and many medications that are not habit-forming may be used. Use of controlled substance medications may be helpful, but they are unlikely to resolve all symptoms or restore all function. Explanation from my Provider: Risks of Controlled Substance Medications:   Opioid pain medications: These medications can lead to problems such as addiction/dependence, sedation, lightheadedness/dizziness, memory issues, falls, constipation, nausea, or vomiting. They may also impair the ability to drive or operate machinery. Additionally, these medications may lower testosterone levels, leading to loss of bone strength, stamina and sex drive.   They may cause problems with breathing, sleep apnea and reduced coughing, which is especially dangerous for patients with lung disease. Overdose or dangerous interactions with alcohol and other medications may occur, leading to death. Hyperalgesia may develop, which means patients receiving opioids for the treatment of pain may become more sensitive to certain painful stimuli, and in some cases, experience pain from ordinarily non-painful stimuli. Women between the ages of 14-53 who could become pregnant should carefully weigh the risks and benefits of opioids with their physicians, as these medications increase the risk of pregnancy complications, including miscarriage,  delivery and stillbirth. It is also possible for babies to be born addicted to opioids. Opioid dependence withdrawal symptoms may include; feelings of uneasiness, increased pain, irritability, belly pain, diarrhea, sweats and goose-flesh. Testosterone replacement therapy:  Potential side effects include increased risk of stroke and heart attack, blood clots, increased blood pressure, increased cholesterol, enlarged prostate, sleep apnea, irritability/aggression and other mood disorders, and decreased fertility. Leslye Schmid (1940)             Page 1 of 4    Initials:_______    Benzodiazepines and non-benzodiazepine sleep medications: These medications can lead to problems such as addiction/dependence, sedation, fatigue, lightheadedness, dizziness, incoordination, falls, depression, hallucinations, and impaired judgment, memory and concentration. The ability to drive and operate machinery may also be affected. Abnormal sleep-related behaviors have been reported, including sleepwalking, driving, making telephone calls, eating, or having sex while not fully awake. These medications can suppress breathing and worsen sleep apnea, particularly when combined with alcohol or other sedating medications, potentially leading to death. Dependence withdrawal symptoms may include tremors, anxiety, hallucinations and seizures.    Stimulants:  Common adverse effects include addiction/dependence, increased blood pressure and heart rate, decreased appetite, nausea, involuntary weight loss, insomnia,  irritability, and headaches. These risks may increase when these medications are combined with other stimulants, such as caffeine pills or energy drinks, certain weight loss supplements and oral decongestants. Dependence withdrawal symptoms may include depressed mood, loss of interest, suicidal thoughts, anxiety, fatigue, appetite changes and agitation. I agree and understand that I and my prescriber have the following rights and responsibilities regarding my treatment plan:   1. MY RIGHTS:  To be informed of my treatment and medication plan. To be an active participant in my health and wellbeing. 2. MY RESPONSIBILITY AND UNDERSTANDING FOR USE OF MEDICATIONS   I will take medications at the dose and frequency as directed. For my safety, I will not increase or change how I take my medications without the recommendation of my healthcare provider.  I will actively participate in any program recommended by my provider which may improve function, including social, physical, psychological programs.  I will not take my medications with alcohol or other drugs not prescribed to me. I understand that drinking alcohol with my medications increases the chances of side effects, including reduced breathing rate and could lead to personal injury when operating machinery.  I understand that if I have a history of substance use disorders, including alcohol or other illicit drugs, that I may be at increased risk of addiction to my medications.  I agree to notify my provider immediately if I should become pregnant so that my treatment plan can be adjusted.    I agree and understand that I shall only receive controlled substance medications from the prescriber that signed this agreement unless there is written agreement among other prescribers of controlled substances outlining the responsibility of the medications being prescribed.  I understand that the if the controlled medication is not helping to achieve goals, the dosage may be tapered and no longer prescribed. 3. MY RESPONSIBILITY FOR COMMUNICATION / PRESCRIPTION RENEWALS   I agree that all controlled substance medications that I take will be prescribed only by my provider. If another healthcare provider prescribes me medication in an emergency, I will notify my provider within seventy-two (72) hours. Pawel Flores (1940)             Page 2 of 4    Initials:_______  Fernando Ludwig I will arrange for refills at the prescribed interval ONLY during regular office hours. I will not ask for refills earlier than agreed, after-hours, on holidays or weekends. Refills may take up to 72 hours for processing and prescriptions to reach the pharmacy.  I will inform my other health care providers that I am taking these medications and of the existence of this [id number]. In the event of an emergency, I will provide the same information to the emergency department prescribers.  I will keep my provider updated on the pharmacy I am using for controlled medication prescription filling. 4. MY RESPONSIBILITY FOR PROTECTING MEDICATIONS   I will protect my prescriptions and medications. I understand that lost or misplaced prescriptions will not be replaced.  I will keep medications only for my own use and will not share them with others. I will keep all medications away from children.  I agree that if my medications are adjusted or discontinued, I will properly dispose of any remaining medications. I understand that I will be required to dispose of any remaining controlled medications as, directed by my prescriber, prior to being provided with any prescriptions for other controlled medications.   Medication drop box locations can be found at: [employer]  5. MY RESPONSIBILITY WITH ILLEGAL DRUGS    I will not use illegal or street drugs or another person's prescription medications not prescribed to me.  If there are identified addiction type symptoms, then referral to a program may be provided by my provider and I agree to follow through with this recommendation. 6. MY RESPONSIBILITY FOR COOPERATION WITH INVESTIGATIONS   I understand that my provider will comply with any applicable law and may discuss my use and/or possible misuse/abuse of controlled substances and alcohol, as appropriate, with any health care provider involved in my care, pharmacist, or legal authority.  I authorize my provider and pharmacy to cooperate fully with law enforcement agencies (as permitted by law) in the investigation of any possible misuse, sale, or other diversion of my controlled substances.  I agree to waive any applicable privilege or right of privacy or confidentiality with respect to these authorizations. 7. PROVIDERS RIGHT TO MONITOR FOR SAFETY: PRESCRIPTION MONITORING / DRUG TESTING   I consent to drug/toxicology screening and will submit to a drug screen upon my providers request to assure I am only taking the prescribed drugs for my safety monitoring. I understand that a drug screen is a laboratory test in which a sample of my urine, blood or saliva is checked to see what drugs I have been taking. This may entail an observed urine specimen, which means that a nurse or other health care provider may watch me provide urine, and I will cooperate if I am asked to provide an observed specimen. Lloyd Goodwin (1940)             Page 3 of 4    Initials:_______  Barb Arcos I understand that my provider will check a copy of my State Prescription Monitoring Program () Report in order to safely prescribe medications.      Pill Counts: I consent to pill counts when requested. I may be asked to bring all my prescribed controlled substance medications, in their original bottles, to all of my scheduled appointments. In addition, my provider may ask me to come to the practice at any time for a random pill count. 8. TERMINATION OF THIS AGREEMENT   For my safety, my prescriber has the right to stop prescribing controlled substance medications and may end this agreement.  Conditions that may result in termination of this agreement:  a. I do not show any improvement in pain, or my activity has not improved. b. I develop rapid tolerance or loss of improvement, as described in my treatment plan.  c. I develop significant side effects from the medication. d. My behavior is not consistent with the responsibilities outlined above, thereby causing safety concerns to continue prescribing controlled substance medications. e. I fail to follow the terms of this agreement. f. Other:____________________________     UNDERSTANDING THIS MEDICATION AGREEMENT:    I have read the above and have had all my questions answered. For chronic disease management, I know that my symptoms can be managed with many types of treatments. A chronic medication trial may be part of my treatment, but I must be an active participant in my care. Medication therapy is only one part of my symptom management plan. In some cases, there may be limited scientific evidence to support the chronic use of certain medications to improve symptoms and daily function. Furthermore, in certain circumstances, there may be scientific information that suggests that the use of chronic controlled substances may worsen my symptoms and increase my risk of unintentional death directly related to this medication therapy.   I know that if my provider feels my risk from controlled medications is greater than my benefit, I will have my controlled substance medication(s) compassionately lowered or removed altogether. I further agree to allow this office to contact my HIPAA contact if there are concerns about my safety and use of the controlled medications. I have agreed to use the prescribed controlled substance medications to me as instructed by my provider and as stated in this Medication Agreement. My initial on each page and my signature below shows that I have read each page and I have had the opportunity to ask questions with answers provided by my provider.       Patient Name (Printed): _____________________________________    Patient Signature:  ______________________   Date: _____________      Prescriber Name (Printed): ___________________________________    Prescriber Signature: _____________________  Date: _____________     Flqauito Toscano (1940)             Page 4 of 4

## 2023-03-27 NOTE — PROGRESS NOTES
Patient identified by 2 identifiers. Chief Complaint   Patient presents with    Follow-up     1. Have you been to the ER, urgent care clinic since your last visit? Hospitalized since your last visit? No    2. Have you seen or consulted any other health care providers outside of the [address] [place] [address] since your last visit? Include any pap smears or colon screening.  No

## 2023-03-27 NOTE — PROGRESS NOTES
HISTORY OF PRESENT ILLNESS  Tacho Chiu is a 81 y.o. female. HPI  Follow up on chronic medical problems. Overall feeling great. Weight is down by 17 pounds since her last visit. She has been eating healthier and doing more fasting. Cardiovascular Review:  The patient has coronary artery disease and hyperlipidemia. Has dilated cardiomyopathy which had worsen worsened to lvef 35-40 on GDMT earlier in 2022. Pt started on entresto and jardiance. Cardiologist is Dr. Kat Lynn. Had cardiac catheterization was 12/2019 and was decided to medically treat the proximal RCA and distal second DIAG lesion. Overall doing well. Had episode of SSS/CHB in May 2020 and had second PM placement in 2022. Had cardiac Cath at that time May 2020. Similar CAD as prior cath 12/2019. Repeat cardiac Cath 12/2021 d/t increased SOB:  Moderate CAD. no significant change from 5/20. Mild LV dysfunction. Diet and Lifestyle: generally follows a low fat low cholesterol diet, generally follows a low sodium diet, exercises sporadically, we discussed increasing her exercise regimen. Home BP Monitoring: is not measured at home. Pertinent ROS: taking medications as instructed, no medication side effects noted, no TIA's, no chest pain on exertion, no dyspnea on exertion, no swelling of ankles, no palpitations. Hypertriglyceridemia follow up:  Compliant w/ meds, low fat, low cholesterol diet. She is on crestor. Triglyceride are still slight elevated. Discussed adding trico is numbers. Tolerating well. Exercising some. No muscle nor abdominal pain, no skin discoloration. Osteoarthritis:  Patient has osteoarthritis. Has various joint aches but overall doing well. Symptoms onset: problem is longstanding. Rheumatological ROS: stable, mild-to-moderate joint symptoms intermittently, reasonably well controlled by PRN meds. Response to treatment plan: stable and intermittent.    Glucose intolerance reveiw:  She has IGT. Diabetic ROS - further diabetic ROS: no polyuria or polydipsia, no chest pain, dyspnea or TIA's, no numbness, tingling or pain in extremities, no unusual visual symptoms. Lab review: orders written for new lab studies as appropriate; see orders. Patient Active Problem List   Diagnosis Code    Hypertriglyceridemia E78.1    GERD (gastroesophageal reflux disease) K21.9    OA (osteoarthritis) M19.90    Vitamin D deficiency E55.9    Anxiety F41.9    Environmental allergies Z91.09    Encounter for medication monitoring Z51.81    IGT (impaired glucose tolerance) R73.02    CHB (complete heart block) ([facility]) I44.2    S/P placement of cardiac pacemaker Z95.0    S/P cardiac cath Z98.890    Mixed hyperlipidemia E78.2    Coronary artery disease involving native coronary artery of native heart without angina pectoris I25.10    Dilated cardiomyopathy ([facility]) I42.0    Chronic systolic congestive heart failure ([facility]) I50.22       Current Outpatient Medications   Medication Sig Dispense Refill    pantoprazole (PROTONIX) 40 mg tablet Take 1 Tablet by mouth daily. 90 Tablet 3    LORazepam (ATIVAN) 1 mg tablet Take 0.5-1 Tablets by mouth every eight (8) hours as needed for Anxiety. 90 Tablet 0    sacubitriL-valsartan (Entresto) 49-51 mg tab tablet Take 1 Tablet by mouth two (2) times a day. empagliflozin (JARDIANCE) 10 mg tablet Take 1 Tablet by mouth daily. metoprolol succinate (TOPROL-XL) 25 mg XL tablet Take 1 Tablet by mouth nightly. 90 Tablet 3    ranolazine ER (Ranexa) 500 mg SR tablet Take 1 Tablet by mouth two (2) times a day. 180 Tablet 1    rosuvastatin (CRESTOR) 20 mg tablet Take 1 Tablet by mouth nightly. 90 Tablet 1    aspirin delayed-release 81 mg tablet Take 1 Tab by mouth daily. 30 Tab 5    cetirizine (ZYRTEC) 10 mg tablet Take 10 mg by mouth daily. cholecalciferol (VITAMIN D3) 25 mcg (1,000 unit) cap Take 1 Cap by mouth daily.            Allergies   Allergen Reactions    Macrobid [Nitrofurantoin Monohyd/M-Cryst] Rash    Pcn [Penicillins] Hives    Nitrofurantoin Rash         Past Medical History:   Diagnosis Date    AV block, 3rd degree ([facility] [address] 05/20/2020    CAD (coronary artery disease)     Cancer ([facility] [address] 07/2021    basal cell- nose    Chronic systolic congestive heart failure ([facility] [address] 03/18/2022    COVID-19 virus infection 12/2022    GERD (gastroesophageal reflux disease) 02/23/2010    Hypertriglyceridemia 02/23/2010    Hypovitaminosis D     Long term current use of anticoagulant therapy     ASA 81 mg    NSVT (nonsustained ventricular tachycardia) 05/20/2020    OA (osteoarthritis) 02/23/2010    Osteoarthritis     Pacemaker     S/P cardiac cath 05/20/2020    S/P placement of cardiac pacemaker 05/20/2020 5/20/2020 Medtronic dual chamber pacemaker implant     Syncope     Vitamin D deficiency 02/23/2010         Past Surgical History:   Procedure Laterality Date    HX ACL RECONSTRUCTION  1998    HX CATARACT REMOVAL  06/2018    right eye    HX HEART CATHETERIZATION      HX HEENT  12/08    cervical neck stenosis s/p cervical release    HX HERNIA REPAIR  3001    umbilical    HX HIP REPLACEMENT  6/2007    right    HX HIP REPLACEMENT  12/2007    left    HX ORTHOPAEDIC  12/2008    cervical release    HX SKIN BIOPSY  07/2021    nose    MN COLONOSCOPY FLX DX W/COLLJ SPEC WHEN PFRMD  06/2006    [place] COLONOSCOPY FLX DX W/COLLJ SPEC WHEN PFRMD  08/29/2011    Dr. Mckinley Adames INS NEW/RPLCMT PRM PM W/TRANSV ELTRD ATRIAL&VENT N/A 5/20/2020    Insert Ppm Dual performed by Kristina Manriquez MD at [facility] CARDIAC CATH LAB         Family History   Problem Relation Age of Onset    Hypertension Mother     Heart Failure Mother     Cancer Father         colon    Lung Disease Brother     Arthritis-rheumatoid Brother     Heart Disease Brother        Social History     Tobacco Use    Smoking status: Former     Packs/day: 0.25     Years: 55.00     Pack years: 13.75     Types: Cigarettes     Quit date: 11/18/2019     Years since quitting: 3.3    Smokeless tobacco: Never   Substance Use Topics    Alcohol use: Yes     Alcohol/week: 0.0 standard drinks     Comment: occastional        Lab Results   Component Value Date/Time    WBC 7.2 03/17/2022 01:37 PM    HGB 13.3 03/17/2022 01:37 PM    HCT 43.2 03/17/2022 01:37 PM    PLATELET 252 82/44/4151 01:37 PM    MCV 96.6 03/17/2022 01:37 PM     Lab Results   Component Value Date/Time    Cholesterol, total 148 09/26/2022 12:51 PM    HDL Cholesterol 49 09/26/2022 12:51 PM    LDL, calculated 50.2 09/26/2022 12:51 PM    LDL-C, External 134 06/26/2015 07:42 AM    Triglyceride 244 (H) 09/26/2022 12:51 PM    CHOL/HDL Ratio 3.0 09/26/2022 12:51 PM     Lab Results   Component Value Date/Time    TSH 0.196 (L) 10/02/2019 11:28 AM    T3 Uptake 20 (L) 10/02/2019 11:28 AM    T4, Free 1.15 10/02/2019 11:28 AM    T4, Total 6.3 10/02/2019 11:28 AM      Lab Results   Component Value Date/Time    Sodium 140 09/26/2022 12:51 PM    Potassium 4.5 09/26/2022 12:51 PM    Chloride 108 09/26/2022 12:51 PM    CO2 25 09/26/2022 12:51 PM    Anion gap 7 09/26/2022 12:51 PM    Glucose 101 (H) 09/26/2022 12:51 PM    BUN 16 09/26/2022 12:51 PM    Creatinine 1.23 (H) 09/26/2022 12:51 PM    BUN/Creatinine ratio 13 09/26/2022 12:51 PM    GFR est AA 51 (L) 09/26/2022 12:51 PM    GFR est non-AA 42 (L) 09/26/2022 12:51 PM    Calcium 9.8 09/26/2022 12:51 PM    Bilirubin, total 0.5 09/26/2022 12:51 PM    ALT (SGPT) 20 09/26/2022 12:51 PM    Alk. phosphatase 96 09/26/2022 12:51 PM    Protein, total 7.0 09/26/2022 12:51 PM    Albumin 4.2 09/26/2022 12:51 PM    Globulin 2.8 09/26/2022 12:51 PM    A-G Ratio 1.5 09/26/2022 12:51 PM      Lab Results   Component Value Date/Time    Hemoglobin A1c 6.2 (H) 08/17/2021 11:17 AM    Hemoglobin A1c (POC) 6.2 09/26/2022 12:30 PM    Hemoglobin A1c, External 6.2 06/26/2015 12:00 AM         Review of Systems   Constitutional:  Negative for malaise/fatigue. HENT:  Negative for congestion.     Eyes:  Negative for blurred vision. Respiratory:  Negative for cough and shortness of breath. Cardiovascular:  Negative for chest pain, palpitations and leg swelling. Gastrointestinal:  Negative for abdominal pain, constipation and heartburn. Genitourinary:  Negative for dysuria, frequency and urgency. Musculoskeletal:  Negative for back pain and joint pain. Neurological:  Negative for dizziness, tingling and headaches. Endo/Heme/Allergies:  Negative for environmental allergies. Psychiatric/Behavioral:  Negative for depression. The patient does not have insomnia. Physical Exam  Vitals and nursing note reviewed. Constitutional:       Appearance: Normal appearance. She is well-developed. Comments: BP (!) 117/59   Pulse 75   Temp 98.3 °F (36.8 °C)   Resp 12   Ht 5' 1\" (1.549 m)   Wt 163 lb 6.4 oz (74.1 kg)   SpO2 95%   BMI 30.87 kg/m²    HENT:      Right Ear: Tympanic membrane normal.      Left Ear: Tympanic membrane normal.      Nose: No rhinorrhea. Neck:      Thyroid: No thyromegaly. Cardiovascular:      Rate and Rhythm: Normal rate and regular rhythm. Heart sounds: Normal heart sounds. No murmur heard. No gallop. Pulmonary:      Effort: Pulmonary effort is normal.      Breath sounds: Normal breath sounds. Abdominal:      General: Abdomen is flat. Palpations: Abdomen is soft. Tenderness: There is no abdominal tenderness. Musculoskeletal:      Cervical back: Normal range of motion. No tenderness. Right lower leg: No edema. Left lower leg: No edema. Neurological:      Mental Status: She is alert. ASSESSMENT and PLAN  Diagnoses and all orders for this visit:    1. Mixed hyperlipidemia  -     LIPID PANEL; Future    2. Coronary artery disease involving native coronary artery of native heart without angina pectoris  3. Chronic systolic heart failure ([address]  4. SSS (sick sinus syndrome) ([facility])  5. Cardiac pacemaker in situ  Stable as per cardiology    6.  IGT (impaired glucose tolerance)  A1c stable at 5.6%. Continue to monitor. Work on diet and exercise. 7. Primary osteoarthritis involving multiple joints  Stable     8. Hypovitaminosis D  -     VITAMIN D, 25 HYDROXY; Future    9. Encounter for medication monitoring  -     CBC W/O DIFF; Future  -     METABOLIC PANEL, COMPREHENSIVE; Future    10. Non morbid obesity  I have reviewed/discussed the above normal BMI with the patient. I have recommended the following interventions: dietary management education, guidance, and counseling . Follow-up and Dispositions    Return in about 6 months (around 9/27/2023) for medicare wellness exam.       reviewed diet, exercise and weight control  cardiovascular risk and specific lipid/LDL goals reviewed  reviewed medications and side effects in detail  specific diabetic recommendations: low cholesterol diet, weight control and daily exercise discussed and glycohemoglobin and other lab monitoring discussed      I have discussed diagnosis listed in this note with pt and/or family. I have discussed treatment plans and options and the risk/benefit analysis of those options, including safe use of medications and possible medication side effects. Through the use of shared decision making we have agreed to the above plan. The patient has received an after-visit summary and questions were answered concerning future plans and follow up. Advise pt of any urgent changes then to proceed to the ER.

## 2023-03-28 LAB
25(OH)D3 SERPL-MCNC: 32.5 NG/ML (ref 30–100)
ALBUMIN SERPL-MCNC: 4 G/DL (ref 3.5–5)
ALBUMIN/GLOB SERPL: 1.4 (ref 1.1–2.2)
ALP SERPL-CCNC: 84 U/L (ref 45–117)
ALT SERPL-CCNC: 15 U/L (ref 12–78)
ANION GAP SERPL CALC-SCNC: 5 MMOL/L (ref 5–15)
AST SERPL-CCNC: 15 U/L (ref 15–37)
BILIRUB SERPL-MCNC: 0.6 MG/DL (ref 0.2–1)
BUN SERPL-MCNC: 19 MG/DL (ref 6–20)
BUN/CREAT SERPL: 17 (ref 12–20)
CALCIUM SERPL-MCNC: 10 MG/DL (ref 8.5–10.1)
CHLORIDE SERPL-SCNC: 109 MMOL/L (ref 97–108)
CHOLEST SERPL-MCNC: 166 MG/DL
CO2 SERPL-SCNC: 26 MMOL/L (ref 21–32)
CREAT SERPL-MCNC: 1.12 MG/DL (ref 0.55–1.02)
ERYTHROCYTE [DISTWIDTH] IN BLOOD BY AUTOMATED COUNT: 15 % (ref 11.5–14.5)
GLOBULIN SER CALC-MCNC: 2.8 G/DL (ref 2–4)
GLUCOSE SERPL-MCNC: 107 MG/DL (ref 65–100)
HCT VFR BLD AUTO: 43.1 % (ref 35–47)
HDLC SERPL-MCNC: 58 MG/DL
HDLC SERPL: 2.9 (ref 0–5)
HGB BLD-MCNC: 13.7 G/DL (ref 11.5–16)
LDLC SERPL CALC-MCNC: 58.2 MG/DL (ref 0–100)
MCH RBC QN AUTO: 29.1 PG (ref 26–34)
MCHC RBC AUTO-ENTMCNC: 31.8 G/DL (ref 30–36.5)
MCV RBC AUTO: 91.5 FL (ref 80–99)
NRBC # BLD: 0 K/UL (ref 0–0.01)
NRBC BLD-RTO: 0 PER 100 WBC
PLATELET # BLD AUTO: 259 K/UL (ref 150–400)
PMV BLD AUTO: 10.9 FL (ref 8.9–12.9)
POTASSIUM SERPL-SCNC: 5 MMOL/L (ref 3.5–5.1)
PROT SERPL-MCNC: 6.8 G/DL (ref 6.4–8.2)
RBC # BLD AUTO: 4.71 M/UL (ref 3.8–5.2)
SODIUM SERPL-SCNC: 140 MMOL/L (ref 136–145)
TRIGL SERPL-MCNC: 249 MG/DL (ref ?–150)
VLDLC SERPL CALC-MCNC: 49.8 MG/DL
WBC # BLD AUTO: 6.7 K/UL (ref 3.6–11)

## 2023-05-05 DIAGNOSIS — F41.9 ANXIETY: ICD-10-CM

## 2023-05-05 RX ORDER — LORAZEPAM 1 MG/1
TABLET ORAL
Qty: 90 TABLET | Refills: 0 | Status: SHIPPED | OUTPATIENT
Start: 2023-05-05

## 2023-08-01 DIAGNOSIS — F41.9 ANXIETY: Primary | ICD-10-CM

## 2023-08-03 RX ORDER — LORAZEPAM 1 MG/1
TABLET ORAL
Qty: 90 TABLET | Refills: 1 | Status: SHIPPED | OUTPATIENT
Start: 2023-08-03 | End: 2023-09-02

## 2023-08-03 NOTE — TELEPHONE ENCOUNTER
Last appointment: 3/27/23  Next appointment: 9/27/23  Previous refill encounter(s): 5/5/23 #90    Requested Prescriptions     Pending Prescriptions Disp Refills    LORazepam (ATIVAN) 1 MG tablet [Pharmacy Med Name: LORAZEPAM 1MG TABLETS] 90 tablet 0     Sig: TAKE 1/2 TO 1 TABLET BY MOUTH EVERY 8 HOURS AS NEEDED FOR ANXIETY         For Pharmacy Admin Tracking Only    Program: Medication Refill  CPA in place:    Recommendation Provided To:    Intervention Detail: New Rx: 1, reason: Patient Preference  Intervention Accepted By:   Trinh Murillo?:    Time Spent (min): 5

## 2023-09-27 ENCOUNTER — OFFICE VISIT (OUTPATIENT)
Age: 83
End: 2023-09-27
Payer: MEDICARE

## 2023-09-27 VITALS
HEART RATE: 70 BPM | WEIGHT: 159.8 LBS | RESPIRATION RATE: 16 BRPM | SYSTOLIC BLOOD PRESSURE: 103 MMHG | HEIGHT: 61 IN | OXYGEN SATURATION: 99 % | BODY MASS INDEX: 30.17 KG/M2 | DIASTOLIC BLOOD PRESSURE: 62 MMHG | TEMPERATURE: 97.6 F

## 2023-09-27 DIAGNOSIS — M15.9 PRIMARY OSTEOARTHRITIS INVOLVING MULTIPLE JOINTS: ICD-10-CM

## 2023-09-27 DIAGNOSIS — Z79.899 ENCOUNTER FOR LONG-TERM (CURRENT) USE OF MEDICATIONS: ICD-10-CM

## 2023-09-27 DIAGNOSIS — Z95.0 PRESENCE OF CARDIAC PACEMAKER: ICD-10-CM

## 2023-09-27 DIAGNOSIS — I50.22 CHRONIC SYSTOLIC (CONGESTIVE) HEART FAILURE (HCC): ICD-10-CM

## 2023-09-27 DIAGNOSIS — I25.10 ATHEROSCLEROSIS OF NATIVE CORONARY ARTERY OF NATIVE HEART WITHOUT ANGINA PECTORIS: ICD-10-CM

## 2023-09-27 DIAGNOSIS — E78.2 MIXED HYPERLIPIDEMIA: ICD-10-CM

## 2023-09-27 DIAGNOSIS — E55.9 HYPOVITAMINOSIS D: ICD-10-CM

## 2023-09-27 DIAGNOSIS — Z00.00 MEDICARE ANNUAL WELLNESS VISIT, SUBSEQUENT: Primary | ICD-10-CM

## 2023-09-27 DIAGNOSIS — I49.5 SICK SINUS SYNDROME (HCC): ICD-10-CM

## 2023-09-27 DIAGNOSIS — Z23 ENCOUNTER FOR IMMUNIZATION: ICD-10-CM

## 2023-09-27 DIAGNOSIS — R73.02 IMPAIRED GLUCOSE TOLERANCE (ORAL): ICD-10-CM

## 2023-09-27 PROCEDURE — 99214 OFFICE O/P EST MOD 30 MIN: CPT | Performed by: FAMILY MEDICINE

## 2023-09-27 PROCEDURE — 90694 VACC AIIV4 NO PRSRV 0.5ML IM: CPT | Performed by: FAMILY MEDICINE

## 2023-09-27 RX ORDER — LORAZEPAM 1 MG/1
TABLET ORAL
COMMUNITY
Start: 2023-05-05

## 2023-09-27 ASSESSMENT — ENCOUNTER SYMPTOMS
BLOOD IN STOOL: 0
ABDOMINAL PAIN: 0
COUGH: 0
SHORTNESS OF BREATH: 0
RHINORRHEA: 0
CHEST TIGHTNESS: 0
CONSTIPATION: 0

## 2023-09-27 ASSESSMENT — PATIENT HEALTH QUESTIONNAIRE - PHQ9
SUM OF ALL RESPONSES TO PHQ QUESTIONS 1-9: 3
7. TROUBLE CONCENTRATING ON THINGS, SUCH AS READING THE NEWSPAPER OR WATCHING TELEVISION: 0
1. LITTLE INTEREST OR PLEASURE IN DOING THINGS: 3
6. FEELING BAD ABOUT YOURSELF - OR THAT YOU ARE A FAILURE OR HAVE LET YOURSELF OR YOUR FAMILY DOWN: 0
3. TROUBLE FALLING OR STAYING ASLEEP: 0
8. MOVING OR SPEAKING SO SLOWLY THAT OTHER PEOPLE COULD HAVE NOTICED. OR THE OPPOSITE, BEING SO FIGETY OR RESTLESS THAT YOU HAVE BEEN MOVING AROUND A LOT MORE THAN USUAL: 0
SUM OF ALL RESPONSES TO PHQ QUESTIONS 1-9: 3
4. FEELING TIRED OR HAVING LITTLE ENERGY: 0
5. POOR APPETITE OR OVEREATING: 0
SUM OF ALL RESPONSES TO PHQ9 QUESTIONS 1 & 2: 3
SUM OF ALL RESPONSES TO PHQ QUESTIONS 1-9: 3
10. IF YOU CHECKED OFF ANY PROBLEMS, HOW DIFFICULT HAVE THESE PROBLEMS MADE IT FOR YOU TO DO YOUR WORK, TAKE CARE OF THINGS AT HOME, OR GET ALONG WITH OTHER PEOPLE: 0
SUM OF ALL RESPONSES TO PHQ QUESTIONS 1-9: 3
9. THOUGHTS THAT YOU WOULD BE BETTER OFF DEAD, OR OF HURTING YOURSELF: 0
2. FEELING DOWN, DEPRESSED OR HOPELESS: 0

## 2023-09-27 ASSESSMENT — LIFESTYLE VARIABLES
HOW MANY STANDARD DRINKS CONTAINING ALCOHOL DO YOU HAVE ON A TYPICAL DAY: PATIENT DOES NOT DRINK
HOW OFTEN DO YOU HAVE A DRINK CONTAINING ALCOHOL: NEVER

## 2023-09-27 NOTE — PATIENT INSTRUCTIONS
Advance Directives: Care Instructions  Overview  An advance directive is a legal way to state your wishes at the end of your life. It tells your family and your doctor what to do if you can't say what you want. There are two main types of advance directives. You can change them any time your wishes change. Living will. This form tells your family and your doctor your wishes about life support and other treatment. The form is also called a declaration. Medical power of . This form lets you name a person to make treatment decisions for you when you can't speak for yourself. This person is called a health care agent (health care proxy, health care surrogate). The form is also called a durable power of  for health care. If you do not have an advance directive, decisions about your medical care may be made by a family member, or by a doctor or a  who doesn't know you. It may help to think of an advance directive as a gift to the people who care for you. If you have one, they won't have to make tough decisions by themselves. For more information, including forms for your state, see the [address] [place] [address] website (www.caringinfo.org/planning/advance-directives/). Follow-up care is a key part of your treatment and safety. Be sure to make and go to all appointments, and call your doctor if you are having problems. It's also a good idea to know your test results and keep a list of the medicines you take. What should you include in an advance directive? Many states have a unique advance directive form. (It may ask you to address specific issues.) Or you might use a universal form that's approved by many states. If your form doesn't tell you what to address, it may be hard to know what to include in your advance directive. Use the questions below to help you get started. Who do you want to make decisions about your medical care if you are not able to?   What life-support measures do you want if you

## 2023-09-27 NOTE — PROGRESS NOTES
Chief Complaint   Patient presents with    Medicare AWV       1. \"Have you been to the ER, urgent care clinic since your last visit? Hospitalized since your last visit? \" No    2. \"Have you seen or consulted any other health care providers outside of the [address] [place] [address] since your last visit? \" No     3. For patients aged 43-73: Has the patient had a colonoscopy / FIT/ Cologuard? Yes      If the patient is female:    4. For patients aged 43-66: Has the patient had a mammogram within the past 2 years? N/A      5. For patients aged 21-65: Has the patient had a pap smear?  N/A     Health Maintenance Due   Topic Date Due    COVID-19 Vaccine (3 - Pfizer series) 04/29/2021    DTaP/Tdap/Td vaccine (2 - Td or Tdap) 03/21/2022    Flu vaccine (1) 08/01/2023    Annual Wellness Visit (AWV)  09/27/2023
TIA's, no numbness, tingling or pain in extremities, no unusual visual symptoms. Lab review: orders written for new lab studies as appropriate; see orders.     Past Medical History:   Diagnosis Date    AV block, 3rd degree ([address]) 05/20/2020    CAD (coronary artery disease)     Cancer ([address]) 07/2021    basal cell- nose    Chronic systolic congestive heart failure ([address]) 03/18/2022    COVID-19 virus infection 12/2022    GERD (gastroesophageal reflux disease) 02/23/2010    Hypertriglyceridemia 02/23/2010    Long term current use of anticoagulant therapy     ASA 81 mg    NSVT (nonsustained ventricular tachycardia) ([address]) 05/20/2020    OA (osteoarthritis) 02/23/2010    Osteoarthritis     Pacemaker     S/P cardiac cath 05/20/2020    S/P placement of cardiac pacemaker 05/20/2020 5/20/2020 Medtronic dual chamber pacemaker implant     Syncope     Vitamin D deficiency 02/23/2010     Past Surgical History:   Procedure Laterality Date    [facility]      CATARACT REMOVAL  06/2018    right eye    COLONOSCOPY FLX DX W/COLLJ SPEC WHEN PFRMD  06/2006    COLONOSCOPY FLX DX W/COLLJ SPEC WHEN PFRMD  08/29/2011    Dr. Bharathi Navarro  12/08    cervical neck stenosis s/p cervical release    HERNIA REPAIR  2049    umbilical    INS NEW/RPLCMT PRM PM W/TRANSV ELTRD ATRIAL&VENT N/A 5/20/2020    Insert Ppm Dual performed by Nick Henderson MD at [facility] CARDIAC CATH LAB    ORTHOPEDIC SURGERY  12/2008    cervical release    SKIN BIOPSY  07/2021    nose    TOTAL HIP ARTHROPLASTY  6/2007    right    TOTAL HIP ARTHROPLASTY  12/2007    left     Current Outpatient Medications   Medication Sig Dispense Refill    LORazepam (ATIVAN) 1 MG tablet TAKE 1/2 TO 1 TABLET BY MOUTH EVERY 8 HOURS AS NEEDED FOR ANXIETY      aspirin 81 MG EC tablet Take 1 tablet by mouth daily      cetirizine (ZYRTEC) 10 MG tablet Take 1 tablet by mouth daily      vitamin D 25 MCG (1000 UT) CAPS Take 1 capsule by mouth
Problems  Med Hx  Surg Hx  Soc Hx  Fam Hx

## 2023-10-03 ENCOUNTER — TELEPHONE (OUTPATIENT)
Age: 83
End: 2023-10-03

## 2023-10-03 NOTE — TELEPHONE ENCOUNTER
----- Message from Kiana Gallagher sent at 10/3/2023  1:43 PM EDT -----  Regarding: Lab Notification: Samples unable to be obtained  We have been notified that samples could not be obtained for the patient below's most recent lab work. Please place new orders prior to the patient's return. If additional assistance is required, please contact Client Services at [phone].     Patient: Bladimir Andarde  : 1940  Ordering Provider: Kostas Szymanski    Thank you,    [facility] Client Services

## 2023-10-06 ENCOUNTER — OFFICE VISIT (OUTPATIENT)
Age: 83
End: 2023-10-06

## 2023-10-06 DIAGNOSIS — E78.2 MIXED HYPERLIPIDEMIA: Primary | ICD-10-CM

## 2023-10-06 DIAGNOSIS — I25.10 ATHEROSCLEROSIS OF NATIVE CORONARY ARTERY OF NATIVE HEART WITHOUT ANGINA PECTORIS: ICD-10-CM

## 2023-10-06 DIAGNOSIS — Z79.899 ENCOUNTER FOR LONG-TERM (CURRENT) USE OF MEDICATIONS: ICD-10-CM

## 2023-10-06 DIAGNOSIS — I50.22 CHRONIC SYSTOLIC (CONGESTIVE) HEART FAILURE (HCC): ICD-10-CM

## 2023-10-06 DIAGNOSIS — E55.9 HYPOVITAMINOSIS D: ICD-10-CM

## 2023-10-06 LAB
25(OH)D3 SERPL-MCNC: 32 NG/ML (ref 30–100)
ALBUMIN SERPL-MCNC: 3.6 G/DL (ref 3.5–5)
ALBUMIN/GLOB SERPL: 1.3 (ref 1.1–2.2)
ALP SERPL-CCNC: 90 U/L (ref 45–117)
ALT SERPL-CCNC: 15 U/L (ref 12–78)
ANION GAP SERPL CALC-SCNC: 4 MMOL/L (ref 5–15)
AST SERPL-CCNC: 12 U/L (ref 15–37)
BILIRUB SERPL-MCNC: 0.5 MG/DL (ref 0.2–1)
BUN SERPL-MCNC: 20 MG/DL (ref 6–20)
BUN/CREAT SERPL: 17 (ref 12–20)
CALCIUM SERPL-MCNC: 9.7 MG/DL (ref 8.5–10.1)
CHLORIDE SERPL-SCNC: 110 MMOL/L (ref 97–108)
CHOLEST SERPL-MCNC: 149 MG/DL
CO2 SERPL-SCNC: 26 MMOL/L (ref 21–32)
CREAT SERPL-MCNC: 1.16 MG/DL (ref 0.55–1.02)
GLOBULIN SER CALC-MCNC: 2.8 G/DL (ref 2–4)
GLUCOSE SERPL-MCNC: 101 MG/DL (ref 65–100)
HDLC SERPL-MCNC: 52 MG/DL
HDLC SERPL: 2.9 (ref 0–5)
LDLC SERPL CALC-MCNC: 56.8 MG/DL (ref 0–100)
POTASSIUM SERPL-SCNC: 4.8 MMOL/L (ref 3.5–5.1)
PROT SERPL-MCNC: 6.4 G/DL (ref 6.4–8.2)
SODIUM SERPL-SCNC: 140 MMOL/L (ref 136–145)
TRIGL SERPL-MCNC: 201 MG/DL
VLDLC SERPL CALC-MCNC: 40.2 MG/DL

## 2023-11-13 DIAGNOSIS — R05.3 CHRONIC COUGH: Primary | ICD-10-CM

## 2023-11-14 RX ORDER — PROMETHAZINE HYDROCHLORIDE AND CODEINE PHOSPHATE 6.25; 1 MG/5ML; MG/5ML
SOLUTION ORAL
Qty: 180 ML | Refills: 0 | OUTPATIENT
Start: 2023-11-14 | End: 2023-12-14

## 2023-11-14 NOTE — TELEPHONE ENCOUNTER
Last appointment: 10/6/23  Next appointment: 4/8/24  Previous refill encounter(s): 10/6/22 #180    Requested Prescriptions     Pending Prescriptions Disp Refills    promethazine-codeine (PHENERGAN WITH CODEINE) 6.25-10 MG/5ML SOLN solution [Pharmacy Med Name: PROMETHAZINE-CODEINE SYRUP] 180 mL 0     Sig: TAKE 1 TEASPOON (5ML) EVERY 6 HOURS AS NEEEDED FOR COUGH         For Pharmacy Admin Tracking Only    Program: Medication Refill  CPA in place:    Recommendation Provided To:   Intervention Detail: New Rx: 1, reason: Patient Preference  Intervention Accepted By:   Gap Closed?:    Time Spent (min): 5

## 2023-11-16 DIAGNOSIS — R05.3 CHRONIC COUGH: Primary | ICD-10-CM

## 2023-11-16 NOTE — TELEPHONE ENCOUNTER
Last appointment: 10/6/23  Next appointment: 4/8/24  Previous refill encounter(s): 10/6/22    Requested Prescriptions     Pending Prescriptions Disp Refills    promethazine-codeine (PHENERGAN WITH CODEINE) 6.25-10 MG/5ML SOLN solution [Pharmacy Med Name: Kenneth Frazier 180 mL 0     Sig: TAKE 1 TEASPOON (5ML) EVERY 6 HOURS AS [address] [place] [address] Pkwy Tracking Only    Program: Medication Refill  CPA in place:    Recommendation Provided To:    Intervention Detail: New Rx: 1, reason: Patient Preference  Intervention Accepted By:   Gill Corbin Closed?:    Time Spent (min): 5

## 2023-11-17 RX ORDER — PROMETHAZINE HYDROCHLORIDE AND CODEINE PHOSPHATE 6.25; 1 MG/5ML; MG/5ML
SOLUTION ORAL
Qty: 180 ML | Refills: 0 | Status: SHIPPED | OUTPATIENT
Start: 2023-11-17 | End: 2023-12-17

## 2024-02-06 DIAGNOSIS — F41.9 ANXIETY: Primary | ICD-10-CM

## 2024-02-07 RX ORDER — LORAZEPAM 1 MG/1
TABLET ORAL
Qty: 90 TABLET | Refills: 0 | Status: SHIPPED | OUTPATIENT
Start: 2024-02-07 | End: 2024-04-07

## 2024-02-07 NOTE — TELEPHONE ENCOUNTER
Last appointment: 10/6/23  Next appointment: 3/13/24, 4/8/24  Previous refill encounter(s): 8/3/23 #90 with 1 refill    Requested Prescriptions     Pending Prescriptions Disp Refills    LORazepam (ATIVAN) 1 MG tablet [Pharmacy Med Name: LORAZEPAM 1MG TABLETS] 90 tablet 1     Sig: TAKE 1/2 TO 1 TABLET BY MOUTH EVERY 8 HOURS AS NEEDED FOR ANXIETY         For Pharmacy Admin Tracking Only    Program: Medication Refill  CPA in place:    Recommendation Provided To:   Intervention Detail: New Rx: 1, reason: Patient Preference  Intervention Accepted By:   Gap Closed?:    Time Spent (min): 5

## 2024-02-29 RX ORDER — PANTOPRAZOLE SODIUM 40 MG/1
40 TABLET, DELAYED RELEASE ORAL DAILY
Qty: 90 TABLET | Refills: 3 | Status: SHIPPED | OUTPATIENT
Start: 2024-02-29

## 2024-02-29 NOTE — TELEPHONE ENCOUNTER
Last appointment: 10/6/23  Next appointment: 4/8/24  Previous refill encounter(s): 2/20/23    Requested Prescriptions     Pending Prescriptions Disp Refills    pantoprazole (PROTONIX) 40 MG tablet 90 tablet 3     Sig: Take 1 tablet by mouth daily         For Pharmacy Admin Tracking Only    Program: Medication Refill  CPA in place:    Recommendation Provided To:   Intervention Detail: New Rx: 1, reason: Patient Preference  Intervention Accepted By:   Gap Closed?:    Time Spent (min): 5

## 2024-03-13 ENCOUNTER — OFFICE VISIT (OUTPATIENT)
Age: 84
End: 2024-03-13
Payer: MEDICARE

## 2024-03-13 VITALS
SYSTOLIC BLOOD PRESSURE: 119 MMHG | HEIGHT: 61 IN | TEMPERATURE: 97.3 F | HEART RATE: 63 BPM | RESPIRATION RATE: 18 BRPM | BODY MASS INDEX: 30.62 KG/M2 | WEIGHT: 162.2 LBS | OXYGEN SATURATION: 98 % | DIASTOLIC BLOOD PRESSURE: 54 MMHG

## 2024-03-13 DIAGNOSIS — Z79.899 ENCOUNTER FOR LONG-TERM (CURRENT) USE OF MEDICATIONS: ICD-10-CM

## 2024-03-13 DIAGNOSIS — Z95.0 PRESENCE OF CARDIAC PACEMAKER: ICD-10-CM

## 2024-03-13 DIAGNOSIS — M15.9 PRIMARY OSTEOARTHRITIS INVOLVING MULTIPLE JOINTS: ICD-10-CM

## 2024-03-13 DIAGNOSIS — R73.02 IMPAIRED GLUCOSE TOLERANCE (ORAL): ICD-10-CM

## 2024-03-13 DIAGNOSIS — I50.22 CHRONIC SYSTOLIC (CONGESTIVE) HEART FAILURE (HCC): ICD-10-CM

## 2024-03-13 DIAGNOSIS — I25.10 ATHEROSCLEROSIS OF NATIVE CORONARY ARTERY OF NATIVE HEART WITHOUT ANGINA PECTORIS: ICD-10-CM

## 2024-03-13 DIAGNOSIS — E78.2 MIXED HYPERLIPIDEMIA: ICD-10-CM

## 2024-03-13 DIAGNOSIS — Z86.79 HISTORY OF SICK SINUS SYNDROME: ICD-10-CM

## 2024-03-13 DIAGNOSIS — E55.9 HYPOVITAMINOSIS D: ICD-10-CM

## 2024-03-13 DIAGNOSIS — F41.9 ANXIETY: ICD-10-CM

## 2024-03-13 DIAGNOSIS — Z01.818 PRE-OP EXAM: Primary | ICD-10-CM

## 2024-03-13 DIAGNOSIS — H25.012 CORTICAL AGE-RELATED CATARACT OF LEFT EYE: ICD-10-CM

## 2024-03-13 PROCEDURE — 99214 OFFICE O/P EST MOD 30 MIN: CPT | Performed by: FAMILY MEDICINE

## 2024-03-13 PROCEDURE — 1036F TOBACCO NON-USER: CPT | Performed by: FAMILY MEDICINE

## 2024-03-13 PROCEDURE — G8417 CALC BMI ABV UP PARAM F/U: HCPCS | Performed by: FAMILY MEDICINE

## 2024-03-13 PROCEDURE — 1090F PRES/ABSN URINE INCON ASSESS: CPT | Performed by: FAMILY MEDICINE

## 2024-03-13 PROCEDURE — G8400 PT W/DXA NO RESULTS DOC: HCPCS | Performed by: FAMILY MEDICINE

## 2024-03-13 PROCEDURE — 1123F ACP DISCUSS/DSCN MKR DOCD: CPT | Performed by: FAMILY MEDICINE

## 2024-03-13 PROCEDURE — G8484 FLU IMMUNIZE NO ADMIN: HCPCS | Performed by: FAMILY MEDICINE

## 2024-03-13 PROCEDURE — G8427 DOCREV CUR MEDS BY ELIG CLIN: HCPCS | Performed by: FAMILY MEDICINE

## 2024-03-13 RX ORDER — PREDNISOLONE ACETATE 10 MG/ML
SUSPENSION/ DROPS OPHTHALMIC
COMMUNITY
Start: 2024-02-05

## 2024-03-13 SDOH — ECONOMIC STABILITY: INCOME INSECURITY: HOW HARD IS IT FOR YOU TO PAY FOR THE VERY BASICS LIKE FOOD, HOUSING, MEDICAL CARE, AND HEATING?: NOT HARD AT ALL

## 2024-03-13 SDOH — ECONOMIC STABILITY: FOOD INSECURITY: WITHIN THE PAST 12 MONTHS, THE FOOD YOU BOUGHT JUST DIDN'T LAST AND YOU DIDN'T HAVE MONEY TO GET MORE.: NEVER TRUE

## 2024-03-13 SDOH — ECONOMIC STABILITY: FOOD INSECURITY: WITHIN THE PAST 12 MONTHS, YOU WORRIED THAT YOUR FOOD WOULD RUN OUT BEFORE YOU GOT MONEY TO BUY MORE.: NEVER TRUE

## 2024-03-13 SDOH — ECONOMIC STABILITY: HOUSING INSECURITY
IN THE LAST 12 MONTHS, WAS THERE A TIME WHEN YOU DID NOT HAVE A STEADY PLACE TO SLEEP OR SLEPT IN A SHELTER (INCLUDING NOW)?: NO

## 2024-03-13 ASSESSMENT — PATIENT HEALTH QUESTIONNAIRE - PHQ9
SUM OF ALL RESPONSES TO PHQ QUESTIONS 1-9: 0
2. FEELING DOWN, DEPRESSED OR HOPELESS: 0
1. LITTLE INTEREST OR PLEASURE IN DOING THINGS: 0
SUM OF ALL RESPONSES TO PHQ QUESTIONS 1-9: 0
SUM OF ALL RESPONSES TO PHQ QUESTIONS 1-9: 0
SUM OF ALL RESPONSES TO PHQ9 QUESTIONS 1 & 2: 0
SUM OF ALL RESPONSES TO PHQ QUESTIONS 1-9: 0

## 2024-03-13 ASSESSMENT — ENCOUNTER SYMPTOMS
CHEST TIGHTNESS: 0
ABDOMINAL PAIN: 0
COUGH: 0
SHORTNESS OF BREATH: 0
RHINORRHEA: 0
CONSTIPATION: 0
BLOOD IN STOOL: 0

## 2024-03-13 NOTE — PROGRESS NOTES
Chief Complaint   Patient presents with    Pre-op Exam     Cataract surgery       \"Have you been to the ER, urgent care clinic since your last visit?  Hospitalized since your last visit?\"    NO    “Have you seen or consulted any other health care providers outside of [facility] since your last visit?”    Yes, Cardiology.              Vitals:    24 1057   BP: (!) 119/54   Pulse:    Resp:    Temp:    SpO2:        Health Maintenance Due   Topic Date Due    Respiratory Syncytial Virus (RSV) Pregnant or age 60 yrs+ (1 - 1-dose 60+ series) Never done        The patient, Yudith Ashraf, identity was verified by name and .   
in about 7 months (around 10/13/2024) for medicare wellness.  reviewed diet, exercise and weight control  cardiovascular risk and specific lipid/LDL goals reviewed  reviewed medications and side effects in detail  specific diabetic recommendations: low cholesterol diet, weight control and daily exercise discussed and glycohemoglobin and other lab monitoring discussed    I have discussed diagnosis listed in this note with pt and/or family. I have discussed treatment plans and options and the risk/benefit analysis of those options, including safe use of medications and possible medication side effects.   Through the use of shared decision making we have agreed to the above plan. The patient has received an after-visit summary and questions were answered concerning future plans and follow up.  Advise pt of any urgent changes then to proceed to the ER.            Adriana Floyd MD

## 2024-03-14 ENCOUNTER — TELEPHONE (OUTPATIENT)
Age: 84
End: 2024-03-14

## 2024-03-14 LAB
25(OH)D3 SERPL-MCNC: 43.7 NG/ML (ref 30–100)
ALBUMIN SERPL-MCNC: 3.5 G/DL (ref 3.5–5)
ALBUMIN/GLOB SERPL: 1.2 (ref 1.1–2.2)
ALP SERPL-CCNC: 107 U/L (ref 45–117)
ALT SERPL-CCNC: 13 U/L (ref 12–78)
ANION GAP SERPL CALC-SCNC: 6 MMOL/L (ref 5–15)
AST SERPL-CCNC: 8 U/L (ref 15–37)
BILIRUB SERPL-MCNC: 0.7 MG/DL (ref 0.2–1)
BUN SERPL-MCNC: 26 MG/DL (ref 6–20)
BUN/CREAT SERPL: 19 (ref 12–20)
CALCIUM SERPL-MCNC: 10.2 MG/DL (ref 8.5–10.1)
CHLORIDE SERPL-SCNC: 104 MMOL/L (ref 97–108)
CHOLEST SERPL-MCNC: 138 MG/DL
CO2 SERPL-SCNC: 27 MMOL/L (ref 21–32)
CREAT SERPL-MCNC: 1.39 MG/DL (ref 0.55–1.02)
ERYTHROCYTE [DISTWIDTH] IN BLOOD BY AUTOMATED COUNT: 12.9 % (ref 11.5–14.5)
EST. AVERAGE GLUCOSE BLD GHB EST-MCNC: 114 MG/DL
GLOBULIN SER CALC-MCNC: 3 G/DL (ref 2–4)
GLUCOSE SERPL-MCNC: 99 MG/DL (ref 65–100)
HBA1C MFR BLD: 5.6 % (ref 4–5.6)
HCT VFR BLD AUTO: 41.8 % (ref 35–47)
HDLC SERPL-MCNC: 55 MG/DL
HDLC SERPL: 2.5 (ref 0–5)
HGB BLD-MCNC: 12.9 G/DL (ref 11.5–16)
LDLC SERPL CALC-MCNC: 48.4 MG/DL (ref 0–100)
MCH RBC QN AUTO: 30 PG (ref 26–34)
MCHC RBC AUTO-ENTMCNC: 30.9 G/DL (ref 30–36.5)
MCV RBC AUTO: 97.2 FL (ref 80–99)
NRBC # BLD: 0 K/UL (ref 0–0.01)
NRBC BLD-RTO: 0 PER 100 WBC
PLATELET # BLD AUTO: 296 K/UL (ref 150–400)
PMV BLD AUTO: 10.2 FL (ref 8.9–12.9)
POTASSIUM SERPL-SCNC: 4.7 MMOL/L (ref 3.5–5.1)
PROT SERPL-MCNC: 6.5 G/DL (ref 6.4–8.2)
RBC # BLD AUTO: 4.3 M/UL (ref 3.8–5.2)
SODIUM SERPL-SCNC: 137 MMOL/L (ref 136–145)
TRIGL SERPL-MCNC: 173 MG/DL
VLDLC SERPL CALC-MCNC: 34.6 MG/DL
WBC # BLD AUTO: 8.5 K/UL (ref 3.6–11)

## 2024-03-14 NOTE — TELEPHONE ENCOUNTER
Sara from [facility] [phone] and fax is [phone] requesting History and Physical  form or Office notes that state patient is clear for surgery on Monday.  Office not is needed by 10 am Friday morning in order for patient to keep surgery date.

## 2024-03-15 ENCOUNTER — CLINICAL DOCUMENTATION (OUTPATIENT)
Age: 84
End: 2024-03-15

## 2024-03-15 NOTE — TELEPHONE ENCOUNTER
Patient  calling very upset about her pre-op exam not being faxed to [phone]. This needs to be done by 10 am or patient's surgery will be cancelled. Patient   can be reached at [phone].

## 2024-03-20 ENCOUNTER — OFFICE VISIT (OUTPATIENT)
Age: 84
End: 2024-03-20

## 2024-03-20 DIAGNOSIS — M54.2 CERVICALGIA: Primary | ICD-10-CM

## 2024-05-01 DIAGNOSIS — F41.9 ANXIETY: ICD-10-CM

## 2024-05-02 RX ORDER — LORAZEPAM 1 MG/1
TABLET ORAL
Qty: 90 TABLET | Refills: 0 | Status: SHIPPED | OUTPATIENT
Start: 2024-05-02 | End: 2024-07-01

## 2024-05-02 NOTE — TELEPHONE ENCOUNTER
Last appointment: 3/20/24  Next appointment: 10/15/24  Previous refill encounter(s): 2/7/24 #90    Requested Prescriptions     Pending Prescriptions Disp Refills    LORazepam (ATIVAN) 1 MG tablet [Pharmacy Med Name: LORAZEPAM 1MG TABLETS] 90 tablet 0     Sig: TAKE 1/2 TO 1 TABLET BY MOUTH EVERY 8 HOURS AS NEEDED FOR ANXIETY         For Pharmacy Admin Tracking Only    Program: Medication Refill  CPA in place:    Recommendation Provided To:   Intervention Detail: New Rx: 1, reason: Patient Preference  Intervention Accepted By:   Gap Closed?:    Time Spent (min): 5

## 2024-05-31 RX ORDER — PANTOPRAZOLE SODIUM 40 MG/1
40 TABLET, DELAYED RELEASE ORAL DAILY
Qty: 90 TABLET | Refills: 3 | Status: SHIPPED | OUTPATIENT
Start: 2024-05-31

## 2024-05-31 NOTE — TELEPHONE ENCOUNTER
[employer] is requesting a 90 day supply  Previous Rx was sent to the [facility] pharmacy    Last appointment: 3/20/24  Next appointment: 10/15/24    Requested Prescriptions     Pending Prescriptions Disp Refills    pantoprazole (PROTONIX) 40 MG tablet [Pharmacy Med Name: PANTOPRAZOLE SODIUM DR TABS 40MG] 90 tablet 3     Sig: Take 1 tablet by mouth daily         For Pharmacy Admin Tracking Only    Program: Medication Refill  CPA in place:    Recommendation Provided To:   Intervention Detail: New Rx: 1, reason: Patient Preference  Intervention Accepted By:   Gap Closed?:    Time Spent (min): 5

## 2024-06-06 PROBLEM — I42.9 CARDIOMYOPATHY (HCC): Status: ACTIVE | Noted: 2022-07-19

## 2024-06-06 PROBLEM — I49.5 SICK SINUS SYNDROME (HCC): Status: ACTIVE | Noted: 2022-07-19

## 2024-06-06 PROBLEM — Z95.0 CARDIAC PACEMAKER IN SITU: Status: ACTIVE | Noted: 2022-07-19

## 2024-06-06 PROBLEM — E78.5 DYSLIPIDEMIA: Status: ACTIVE | Noted: 2022-05-26

## 2024-07-30 DIAGNOSIS — F41.9 ANXIETY: ICD-10-CM

## 2024-07-30 RX ORDER — LORAZEPAM 1 MG/1
TABLET ORAL
Qty: 90 TABLET | Refills: 1 | Status: SHIPPED | OUTPATIENT
Start: 2024-07-30 | End: 2024-09-28

## 2024-10-15 ENCOUNTER — OFFICE VISIT (OUTPATIENT)
Age: 84
End: 2024-10-15
Payer: MEDICARE

## 2024-10-15 VITALS
RESPIRATION RATE: 22 BRPM | HEIGHT: 60 IN | SYSTOLIC BLOOD PRESSURE: 111 MMHG | HEART RATE: 68 BPM | WEIGHT: 168.8 LBS | DIASTOLIC BLOOD PRESSURE: 61 MMHG | BODY MASS INDEX: 33.14 KG/M2 | OXYGEN SATURATION: 96 % | TEMPERATURE: 98 F

## 2024-10-15 DIAGNOSIS — F41.9 ANXIETY: ICD-10-CM

## 2024-10-15 DIAGNOSIS — Z86.79 HISTORY OF SICK SINUS SYNDROME: ICD-10-CM

## 2024-10-15 DIAGNOSIS — Z95.0 PRESENCE OF CARDIAC PACEMAKER: ICD-10-CM

## 2024-10-15 DIAGNOSIS — E55.9 HYPOVITAMINOSIS D: ICD-10-CM

## 2024-10-15 DIAGNOSIS — Z79.899 ENCOUNTER FOR LONG-TERM (CURRENT) USE OF MEDICATIONS: ICD-10-CM

## 2024-10-15 DIAGNOSIS — Z00.00 MEDICARE ANNUAL WELLNESS VISIT, SUBSEQUENT: Primary | ICD-10-CM

## 2024-10-15 DIAGNOSIS — E78.2 MIXED HYPERLIPIDEMIA: ICD-10-CM

## 2024-10-15 DIAGNOSIS — M15.0 PRIMARY OSTEOARTHRITIS INVOLVING MULTIPLE JOINTS: ICD-10-CM

## 2024-10-15 DIAGNOSIS — I25.10 ATHEROSCLEROSIS OF NATIVE CORONARY ARTERY OF NATIVE HEART WITHOUT ANGINA PECTORIS: ICD-10-CM

## 2024-10-15 DIAGNOSIS — I50.22 CHRONIC SYSTOLIC (CONGESTIVE) HEART FAILURE (HCC): ICD-10-CM

## 2024-10-15 DIAGNOSIS — R73.02 IMPAIRED GLUCOSE TOLERANCE (ORAL): ICD-10-CM

## 2024-10-15 PROBLEM — I49.5 SICK SINUS SYNDROME (HCC): Status: RESOLVED | Noted: 2022-07-19 | Resolved: 2024-10-15

## 2024-10-15 PROBLEM — I44.2 CHB (COMPLETE HEART BLOCK) (HCC): Status: RESOLVED | Noted: 2020-05-20 | Resolved: 2024-10-15

## 2024-10-15 PROCEDURE — G0439 PPPS, SUBSEQ VISIT: HCPCS | Performed by: FAMILY MEDICINE

## 2024-10-15 PROCEDURE — G8482 FLU IMMUNIZE ORDER/ADMIN: HCPCS | Performed by: FAMILY MEDICINE

## 2024-10-15 PROCEDURE — G8417 CALC BMI ABV UP PARAM F/U: HCPCS | Performed by: FAMILY MEDICINE

## 2024-10-15 PROCEDURE — 1090F PRES/ABSN URINE INCON ASSESS: CPT | Performed by: FAMILY MEDICINE

## 2024-10-15 PROCEDURE — 99214 OFFICE O/P EST MOD 30 MIN: CPT | Performed by: FAMILY MEDICINE

## 2024-10-15 PROCEDURE — 90653 IIV ADJUVANT VACCINE IM: CPT | Performed by: FAMILY MEDICINE

## 2024-10-15 PROCEDURE — PBSHW INFLUENZA, FLUAD TRIVALENT, (AGE 65 Y+), IM, PRESERVATIVE FREE, 0.5ML: Performed by: FAMILY MEDICINE

## 2024-10-15 PROCEDURE — G8427 DOCREV CUR MEDS BY ELIG CLIN: HCPCS | Performed by: FAMILY MEDICINE

## 2024-10-15 PROCEDURE — 1123F ACP DISCUSS/DSCN MKR DOCD: CPT | Performed by: FAMILY MEDICINE

## 2024-10-15 PROCEDURE — 1036F TOBACCO NON-USER: CPT | Performed by: FAMILY MEDICINE

## 2024-10-15 PROCEDURE — G8400 PT W/DXA NO RESULTS DOC: HCPCS | Performed by: FAMILY MEDICINE

## 2024-10-15 RX ORDER — LORAZEPAM 1 MG/1
1 TABLET ORAL EVERY 6 HOURS PRN
COMMUNITY
End: 2024-10-15 | Stop reason: SDUPTHER

## 2024-10-15 RX ORDER — LORAZEPAM 1 MG/1
1 TABLET ORAL EVERY 6 HOURS PRN
Qty: 90 TABLET | Refills: 0 | Status: SHIPPED | OUTPATIENT
Start: 2024-10-15 | End: 2024-11-14

## 2024-10-15 SDOH — ECONOMIC STABILITY: INCOME INSECURITY: HOW HARD IS IT FOR YOU TO PAY FOR THE VERY BASICS LIKE FOOD, HOUSING, MEDICAL CARE, AND HEATING?: NOT HARD AT ALL

## 2024-10-15 SDOH — ECONOMIC STABILITY: FOOD INSECURITY: WITHIN THE PAST 12 MONTHS, YOU WORRIED THAT YOUR FOOD WOULD RUN OUT BEFORE YOU GOT MONEY TO BUY MORE.: NEVER TRUE

## 2024-10-15 SDOH — HEALTH STABILITY: PHYSICAL HEALTH: ON AVERAGE, HOW MANY MINUTES DO YOU ENGAGE IN EXERCISE AT THIS LEVEL?: 40 MIN

## 2024-10-15 SDOH — ECONOMIC STABILITY: FOOD INSECURITY: WITHIN THE PAST 12 MONTHS, THE FOOD YOU BOUGHT JUST DIDN'T LAST AND YOU DIDN'T HAVE MONEY TO GET MORE.: NEVER TRUE

## 2024-10-15 SDOH — HEALTH STABILITY: PHYSICAL HEALTH: ON AVERAGE, HOW MANY DAYS PER WEEK DO YOU ENGAGE IN MODERATE TO STRENUOUS EXERCISE (LIKE A BRISK WALK)?: 4 DAYS

## 2024-10-15 ASSESSMENT — LIFESTYLE VARIABLES
HOW OFTEN DO YOU HAVE SIX OR MORE DRINKS ON ONE OCCASION: 1
HOW OFTEN DO YOU HAVE A DRINK CONTAINING ALCOHOL: 1
HOW MANY STANDARD DRINKS CONTAINING ALCOHOL DO YOU HAVE ON A TYPICAL DAY: 0
HOW OFTEN DO YOU HAVE A DRINK CONTAINING ALCOHOL: NEVER
HOW MANY STANDARD DRINKS CONTAINING ALCOHOL DO YOU HAVE ON A TYPICAL DAY: PATIENT DOES NOT DRINK

## 2024-10-15 ASSESSMENT — ANXIETY QUESTIONNAIRES
2. NOT BEING ABLE TO STOP OR CONTROL WORRYING: NOT AT ALL
GAD7 TOTAL SCORE: 0
7. FEELING AFRAID AS IF SOMETHING AWFUL MIGHT HAPPEN: NOT AT ALL
3. WORRYING TOO MUCH ABOUT DIFFERENT THINGS: NOT AT ALL
IF YOU CHECKED OFF ANY PROBLEMS ON THIS QUESTIONNAIRE, HOW DIFFICULT HAVE THESE PROBLEMS MADE IT FOR YOU TO DO YOUR WORK, TAKE CARE OF THINGS AT HOME, OR GET ALONG WITH OTHER PEOPLE: NOT DIFFICULT AT ALL
5. BEING SO RESTLESS THAT IT IS HARD TO SIT STILL: NOT AT ALL
6. BECOMING EASILY ANNOYED OR IRRITABLE: NOT AT ALL
4. TROUBLE RELAXING: NOT AT ALL
GAD7 TOTAL SCORE: 0
1. FEELING NERVOUS, ANXIOUS, OR ON EDGE: NOT AT ALL

## 2024-10-15 ASSESSMENT — ENCOUNTER SYMPTOMS
COUGH: 0
ABDOMINAL PAIN: 0
BLOOD IN STOOL: 0
SHORTNESS OF BREATH: 0
RHINORRHEA: 0
CHEST TIGHTNESS: 0
CONSTIPATION: 0

## 2024-10-15 ASSESSMENT — PATIENT HEALTH QUESTIONNAIRE - PHQ9
2. FEELING DOWN, DEPRESSED OR HOPELESS: NOT AT ALL
SUM OF ALL RESPONSES TO PHQ9 QUESTIONS 1 & 2: 0
SUM OF ALL RESPONSES TO PHQ QUESTIONS 1-9: 0
1. LITTLE INTEREST OR PLEASURE IN DOING THINGS: NOT AT ALL
SUM OF ALL RESPONSES TO PHQ QUESTIONS 1-9: 0

## 2024-10-15 NOTE — PROGRESS NOTES
Chief Complaint   Patient presents with    Medicare AWV         Here for annual wellness.        \"Have you been to the ER, urgent care clinic since your last visit?  Hospitalized since your last visit?\"    NO    “Have you seen or consulted any other health care providers outside of [facility] since your last visit?”    NO            Click Here for Release of Records Request

## 2024-10-15 NOTE — PATIENT INSTRUCTIONS
with alcohol or drug use, talk to your doctor.   Medicines    Take your medicines exactly as prescribed. Call your doctor if you think you are having a problem with your medicine.     If your doctor recommends aspirin, take the amount directed each day. Make sure you take aspirin and not another kind of pain reliever, such as acetaminophen (Tylenol).   When should you call for help?   Call 911 if you have symptoms of a heart attack. These may include:    Chest pain or pressure, or a strange feeling in the chest.     Sweating.     Shortness of breath.     Pain, pressure, or a strange feeling in the back, neck, jaw, or upper belly or in one or both shoulders or arms.     Lightheadedness or sudden weakness.     A fast or irregular heartbeat.   After you call 911, the  may tell you to chew 1 adult-strength or 2 to 4 low-dose aspirin. Wait for an ambulance. Do not try to drive yourself.  Watch closely for changes in your health, and be sure to contact your doctor if you have any problems.  Where can you learn more?  Go to https://www.[employer].net/patientEd and enter F075 to learn more about \"A Healthy Heart: Care Instructions.\"  Current as of: June 24, 2023  Content Version: 14.2  © 2024 [employer].   Care instructions adapted under license by [employer]. If you have questions about a medical condition or this instruction, always ask your healthcare professional. [employer] disclaims any warranty or liability for your use of this information.      Personalized Preventive Plan for Yudith Ashraf - 10/15/2024  Medicare offers a range of preventive health benefits. Some of the tests and screenings are paid in full while other may be subject to a deductible, co-insurance, and/or copay.    Some of these benefits include a comprehensive review of your medical history including lifestyle, illnesses that may run in your family, and various assessments and screenings as

## 2024-10-15 NOTE — PROGRESS NOTES
Subjective:      Patient ID: Yudith Ashraf is a 84 y.o. female.    HPI  Follow up on chronic medical problems.  Cardiovascular Review:  The patient has coronary artery disease and hyperlipidemia.  Has dilated cardiomyopathy which had worsen worsened to lvef 35-40 on GDMT earlier in 2022.  Pt started on entresto and jardiance.  Cardiologist is Dr. Collins.    Had cardiac catheterization was 12/2019 and was decided to medically treat the proximal RCA and distal second DIAG lesion.  Overall doing well.    Had episode of SSS/CHB in May 2020 and had second PM placement in 2022.     Had cardiac Cath at that time May 2020.  Similar CAD as prior cath 12/2019.   Repeat cardiac Cath 12/2021 d/t increased SOB:  Moderate CAD. no significant change from 5/20.  Mild LV dysfunction.    Diet and Lifestyle: generally follows a low fat low cholesterol diet, generally follows a low sodium diet, exercises sporadically, we discussed increasing her exercise regimen.  Home BP Monitoring: is not measured at home.  Pertinent ROS: taking medications as instructed, no medication side effects noted, no TIA's, no chest pain on exertion, no dyspnea on exertion, no swelling of ankles, no palpitations.   Hypertriglyceridemia follow up:  Compliant w/ meds, low fat, low cholesterol diet.  She is on crestor.  Triglyceride are still slight elevated.  Discussed adding trico is numbers.Tolerating well.  Exercising some.  No muscle nor abdominal pain, no skin discoloration.      Osteoarthritis:  Patient has osteoarthritis.  Has various joint aches but overall doing well.    Symptoms onset: problem is longstanding.  Rheumatological ROS: stable, mild-to-moderate joint symptoms intermittently, reasonably well controlled by PRN meds.   Response to treatment plan: stable and intermittent.       Past Medical History:   Diagnosis Date    AV block, 3rd degree (HCC) 05/20/2020    CAD (coronary artery disease)     Cancer (HCC) 07/2021    basal cell- nose

## 2024-10-15 NOTE — PROGRESS NOTES
Medicare Annual Wellness Visit    Yudith Ashraf is here for Medicare AWV    Assessment & Plan   Medicare annual wellness visit, subsequent  Mixed hyperlipidemia  -     Lipid Panel; Future  Atherosclerosis of native coronary artery of native heart without angina pectoris  Chronic systolic (congestive) heart failure (HCC)  History of sick sinus syndrome  Presence of cardiac pacemaker  Impaired glucose tolerance (oral)  Primary osteoarthritis involving multiple joints  Anxiety  -     LORazepam (ATIVAN) 1 MG tablet; Take 1 tablet by mouth every 6 hours as needed for Anxiety for up to 30 days. Max Daily Amount: 4 mg, Disp-90 tablet, R-0Normal  Hypovitaminosis D  -     Vitamin D 25 Hydroxy; Future  Encounter for long-term (current) use of medications  -     Comprehensive Metabolic Panel; Future  -     CBC; Future  -     Urinalysis with Microscopic; Future    Recommendations for Preventive Services Due: see orders and patient instructions/AVS.  Recommended screening schedule for the next 5-10 years is provided to the patient in written form: see Patient Instructions/AVS.     Return in about 6 months (around 4/15/2025).     Subjective   Patient's complete Health Risk Assessment and screening values have been reviewed and are found in Flowsheets. The following problems were reviewed today and where indicated follow up appointments were made and/or referrals ordered.    Positive Risk Factor Screenings with Interventions:    Fall Risk:  Do you feel unsteady or are you worried about falling? : no  2 or more falls in past year?: (!) yes  Fall with injury in past year?: no     Interventions:    Reviewed medications, home hazards, visual acuity, and co-morbidities that can increase risk for falls  See AVS for additional education material    Cognitive:   Clock Drawing Test (CDT): Normal  Words recalled: 3 Words Recalled     Total Score Interpretation: Abnormal Mini-Cog  Interventions:  See AVS for additional education

## 2024-10-16 LAB
25(OH)D3 SERPL-MCNC: 40.5 NG/ML (ref 30–100)
ALBUMIN SERPL-MCNC: 3.7 G/DL (ref 3.5–5)
ALBUMIN/GLOB SERPL: 1.3 (ref 1.1–2.2)
ALP SERPL-CCNC: 98 U/L (ref 45–117)
ALT SERPL-CCNC: 18 U/L (ref 12–78)
ANION GAP SERPL CALC-SCNC: 6 MMOL/L (ref 2–12)
APPEARANCE UR: CLEAR
AST SERPL-CCNC: 12 U/L (ref 15–37)
BACTERIA URNS QL MICRO: NEGATIVE /HPF
BILIRUB SERPL-MCNC: 0.5 MG/DL (ref 0.2–1)
BILIRUB UR QL: NEGATIVE
BUN SERPL-MCNC: 18 MG/DL (ref 6–20)
BUN/CREAT SERPL: 16 (ref 12–20)
CALCIUM SERPL-MCNC: 10.4 MG/DL (ref 8.5–10.1)
CHLORIDE SERPL-SCNC: 111 MMOL/L (ref 97–108)
CHOLEST SERPL-MCNC: 173 MG/DL
CO2 SERPL-SCNC: 25 MMOL/L (ref 21–32)
COLOR UR: ABNORMAL
CREAT SERPL-MCNC: 1.14 MG/DL (ref 0.55–1.02)
EPITH CASTS URNS QL MICRO: ABNORMAL /LPF
ERYTHROCYTE [DISTWIDTH] IN BLOOD BY AUTOMATED COUNT: 13.3 % (ref 11.5–14.5)
GLOBULIN SER CALC-MCNC: 2.9 G/DL (ref 2–4)
GLUCOSE SERPL-MCNC: 106 MG/DL (ref 65–100)
GLUCOSE UR STRIP.AUTO-MCNC: >1000 MG/DL
HCT VFR BLD AUTO: 40.7 % (ref 35–47)
HDLC SERPL-MCNC: 59 MG/DL
HDLC SERPL: 2.9 (ref 0–5)
HGB BLD-MCNC: 12.8 G/DL (ref 11.5–16)
HGB UR QL STRIP: NEGATIVE
HYALINE CASTS URNS QL MICRO: ABNORMAL /LPF (ref 0–5)
KETONES UR QL STRIP.AUTO: NEGATIVE MG/DL
LDLC SERPL CALC-MCNC: 62.8 MG/DL (ref 0–100)
LEUKOCYTE ESTERASE UR QL STRIP.AUTO: ABNORMAL
MCH RBC QN AUTO: 30.1 PG (ref 26–34)
MCHC RBC AUTO-ENTMCNC: 31.4 G/DL (ref 30–36.5)
MCV RBC AUTO: 95.8 FL (ref 80–99)
NITRITE UR QL STRIP.AUTO: NEGATIVE
NRBC # BLD: 0 K/UL (ref 0–0.01)
NRBC BLD-RTO: 0 PER 100 WBC
PH UR STRIP: 5 (ref 5–8)
PLATELET # BLD AUTO: 279 K/UL (ref 150–400)
PMV BLD AUTO: 10.4 FL (ref 8.9–12.9)
POTASSIUM SERPL-SCNC: 4.8 MMOL/L (ref 3.5–5.1)
PROT SERPL-MCNC: 6.6 G/DL (ref 6.4–8.2)
PROT UR STRIP-MCNC: NEGATIVE MG/DL
RBC # BLD AUTO: 4.25 M/UL (ref 3.8–5.2)
RBC #/AREA URNS HPF: ABNORMAL /HPF (ref 0–5)
SODIUM SERPL-SCNC: 142 MMOL/L (ref 136–145)
SP GR UR REFRACTOMETRY: 1.02 (ref 1–1.03)
SPECIMEN HOLD: NORMAL
TRIGL SERPL-MCNC: 256 MG/DL
UROBILINOGEN UR QL STRIP.AUTO: 0.2 EU/DL (ref 0.2–1)
VLDLC SERPL CALC-MCNC: 51.2 MG/DL
WBC # BLD AUTO: 5.9 K/UL (ref 3.6–11)
WBC URNS QL MICRO: ABNORMAL /HPF (ref 0–4)

## 2025-01-15 DIAGNOSIS — F41.9 ANXIETY: ICD-10-CM

## 2025-01-15 RX ORDER — LORAZEPAM 1 MG/1
1 TABLET ORAL EVERY 6 HOURS PRN
Qty: 90 TABLET | Refills: 0 | Status: SHIPPED | OUTPATIENT
Start: 2025-01-15 | End: 2025-04-24

## 2025-01-15 NOTE — TELEPHONE ENCOUNTER
Last appointment: 10/15/24  Next appointment: 4/16/25  Previous refill encounter(s): 10/15/24 #90    Requested Prescriptions     Pending Prescriptions Disp Refills    LORazepam (ATIVAN) 1 MG tablet [Pharmacy Med Name: LORAZEPAM 1MG TABLETS] 90 tablet 0     Sig: TAKE 1 TABLET BY MOUTH EVERY 6 HOURS AS NEEDED FOR ANXIETY. MAX DAILY AMOUNT: 4 MG         For Pharmacy Admin Tracking Only    Program: Medication Refill  CPA in place:    Recommendation Provided To:   Intervention Detail: New Rx: 1, reason: Patient Preference  Intervention Accepted By:   Gap Closed?:    Time Spent (min): 5

## 2025-03-12 RX ORDER — PANTOPRAZOLE SODIUM 40 MG/1
40 TABLET, DELAYED RELEASE ORAL DAILY
Qty: 90 TABLET | Refills: 3 | Status: SHIPPED | OUTPATIENT
Start: 2025-03-12 | End: 2025-03-14 | Stop reason: SDUPTHER

## 2025-03-12 NOTE — TELEPHONE ENCOUNTER
Last appointment: 4/16/25  Next appointment: 10/15/24  Previous refill encounter(s): 5/31/24 #90 with 3 refills    Requested Prescriptions     Pending Prescriptions Disp Refills    pantoprazole (PROTONIX) 40 MG tablet 90 tablet 3     Sig: Take 1 tablet by mouth daily         For Pharmacy Admin Tracking Only    Program: Medication Refill  CPA in place:    Recommendation Provided To:   Intervention Detail: New Rx: 1, reason: Patient Preference  Intervention Accepted By:   Gap Closed?:    Time Spent (min): 5

## 2025-03-14 RX ORDER — PANTOPRAZOLE SODIUM 40 MG/1
40 TABLET, DELAYED RELEASE ORAL DAILY
Qty: 90 TABLET | Refills: 3 | Status: SHIPPED | OUTPATIENT
Start: 2025-03-14

## 2025-03-14 NOTE — TELEPHONE ENCOUNTER
Patient is requesting a RX refill  pantoprazole (PROTONIX) 40 MG tablet please send it to [facility] home [facility] she can be reached @ [phone]

## 2025-04-03 ENCOUNTER — TRANSCRIBE ORDERS (OUTPATIENT)
Facility: HOSPITAL | Age: 85
End: 2025-04-03

## 2025-04-03 DIAGNOSIS — M54.12 CERVICAL RADICULOPATHY: ICD-10-CM

## 2025-04-03 DIAGNOSIS — Z72.0 CURRENT TOBACCO USE: ICD-10-CM

## 2025-04-03 DIAGNOSIS — Z98.1 HISTORY OF FUSION OF CERVICAL SPINE: ICD-10-CM

## 2025-04-03 DIAGNOSIS — M48.02 CERVICAL STENOSIS OF SPINE: Primary | ICD-10-CM

## 2025-04-03 DIAGNOSIS — M54.2 NECK PAIN: ICD-10-CM

## 2025-04-16 ENCOUNTER — OFFICE VISIT (OUTPATIENT)
Age: 85
End: 2025-04-16
Payer: MEDICARE

## 2025-04-16 VITALS
OXYGEN SATURATION: 97 % | HEART RATE: 65 BPM | WEIGHT: 174.8 LBS | BODY MASS INDEX: 34.32 KG/M2 | DIASTOLIC BLOOD PRESSURE: 62 MMHG | RESPIRATION RATE: 16 BRPM | SYSTOLIC BLOOD PRESSURE: 136 MMHG | HEIGHT: 60 IN | TEMPERATURE: 98.3 F

## 2025-04-16 DIAGNOSIS — I50.22 CHRONIC SYSTOLIC (CONGESTIVE) HEART FAILURE: ICD-10-CM

## 2025-04-16 DIAGNOSIS — I25.10 ATHEROSCLEROSIS OF NATIVE CORONARY ARTERY OF NATIVE HEART WITHOUT ANGINA PECTORIS: ICD-10-CM

## 2025-04-16 DIAGNOSIS — E78.2 MIXED HYPERLIPIDEMIA: Primary | ICD-10-CM

## 2025-04-16 DIAGNOSIS — Z79.899 ENCOUNTER FOR LONG-TERM (CURRENT) USE OF MEDICATIONS: ICD-10-CM

## 2025-04-16 DIAGNOSIS — I42.0 DILATED CARDIOMYOPATHY (HCC): ICD-10-CM

## 2025-04-16 DIAGNOSIS — M15.0 PRIMARY OSTEOARTHRITIS INVOLVING MULTIPLE JOINTS: ICD-10-CM

## 2025-04-16 DIAGNOSIS — E66.9 NON MORBID OBESITY: ICD-10-CM

## 2025-04-16 DIAGNOSIS — E55.9 HYPOVITAMINOSIS D: ICD-10-CM

## 2025-04-16 DIAGNOSIS — Z86.79 HISTORY OF SICK SINUS SYNDROME: ICD-10-CM

## 2025-04-16 DIAGNOSIS — Z95.0 PRESENCE OF CARDIAC PACEMAKER: ICD-10-CM

## 2025-04-16 DIAGNOSIS — R73.02 IMPAIRED GLUCOSE TOLERANCE (ORAL): ICD-10-CM

## 2025-04-16 LAB
25(OH)D3 SERPL-MCNC: 40 NG/ML (ref 30–100)
ALBUMIN SERPL-MCNC: 4 G/DL (ref 3.5–5)
ALBUMIN/GLOB SERPL: 1.4 (ref 1.1–2.2)
ALP SERPL-CCNC: 111 U/L (ref 45–117)
ALT SERPL-CCNC: 16 U/L (ref 12–78)
ANION GAP SERPL CALC-SCNC: 3 MMOL/L (ref 2–12)
AST SERPL-CCNC: 16 U/L (ref 15–37)
BILIRUB SERPL-MCNC: 0.7 MG/DL (ref 0.2–1)
BUN SERPL-MCNC: 25 MG/DL (ref 6–20)
BUN/CREAT SERPL: 19 (ref 12–20)
CALCIUM SERPL-MCNC: 10.1 MG/DL (ref 8.5–10.1)
CHLORIDE SERPL-SCNC: 107 MMOL/L (ref 97–108)
CHOLEST SERPL-MCNC: 146 MG/DL
CO2 SERPL-SCNC: 28 MMOL/L (ref 21–32)
CREAT SERPL-MCNC: 1.3 MG/DL (ref 0.55–1.02)
GLOBULIN SER CALC-MCNC: 2.9 G/DL (ref 2–4)
GLUCOSE SERPL-MCNC: 100 MG/DL (ref 65–100)
GLUCOSE, POC: 117 MG/DL
HBA1C MFR BLD: 5.6 %
HDLC SERPL-MCNC: 57 MG/DL
HDLC SERPL: 2.6 (ref 0–5)
LDLC SERPL CALC-MCNC: 51.4 MG/DL (ref 0–100)
POTASSIUM SERPL-SCNC: 4.5 MMOL/L (ref 3.5–5.1)
PROT SERPL-MCNC: 6.9 G/DL (ref 6.4–8.2)
SODIUM SERPL-SCNC: 138 MMOL/L (ref 136–145)
TRIGL SERPL-MCNC: 188 MG/DL
VLDLC SERPL CALC-MCNC: 37.6 MG/DL

## 2025-04-16 PROCEDURE — 1159F MED LIST DOCD IN RCRD: CPT | Performed by: FAMILY MEDICINE

## 2025-04-16 PROCEDURE — 1126F AMNT PAIN NOTED NONE PRSNT: CPT | Performed by: FAMILY MEDICINE

## 2025-04-16 PROCEDURE — 1160F RVW MEDS BY RX/DR IN RCRD: CPT | Performed by: FAMILY MEDICINE

## 2025-04-16 PROCEDURE — 82962 GLUCOSE BLOOD TEST: CPT | Performed by: FAMILY MEDICINE

## 2025-04-16 PROCEDURE — PBSHW AMB POC HEMOGLOBIN A1C: Performed by: FAMILY MEDICINE

## 2025-04-16 PROCEDURE — PBSHW AMB POC GLUCOSE BLOOD, BY GLUCOSE MONITORING DEVICE: Performed by: FAMILY MEDICINE

## 2025-04-16 PROCEDURE — 1090F PRES/ABSN URINE INCON ASSESS: CPT | Performed by: FAMILY MEDICINE

## 2025-04-16 PROCEDURE — 1123F ACP DISCUSS/DSCN MKR DOCD: CPT | Performed by: FAMILY MEDICINE

## 2025-04-16 PROCEDURE — G8400 PT W/DXA NO RESULTS DOC: HCPCS | Performed by: FAMILY MEDICINE

## 2025-04-16 PROCEDURE — 1036F TOBACCO NON-USER: CPT | Performed by: FAMILY MEDICINE

## 2025-04-16 PROCEDURE — G2211 COMPLEX E/M VISIT ADD ON: HCPCS | Performed by: FAMILY MEDICINE

## 2025-04-16 PROCEDURE — G8417 CALC BMI ABV UP PARAM F/U: HCPCS | Performed by: FAMILY MEDICINE

## 2025-04-16 PROCEDURE — G8427 DOCREV CUR MEDS BY ELIG CLIN: HCPCS | Performed by: FAMILY MEDICINE

## 2025-04-16 PROCEDURE — 99214 OFFICE O/P EST MOD 30 MIN: CPT | Performed by: FAMILY MEDICINE

## 2025-04-16 PROCEDURE — 83036 HEMOGLOBIN GLYCOSYLATED A1C: CPT | Performed by: FAMILY MEDICINE

## 2025-04-16 RX ORDER — GABAPENTIN 100 MG/1
100 CAPSULE ORAL 2 TIMES DAILY
COMMUNITY
Start: 2025-04-07

## 2025-04-16 SDOH — ECONOMIC STABILITY: FOOD INSECURITY: WITHIN THE PAST 12 MONTHS, THE FOOD YOU BOUGHT JUST DIDN'T LAST AND YOU DIDN'T HAVE MONEY TO GET MORE.: NEVER TRUE

## 2025-04-16 SDOH — ECONOMIC STABILITY: FOOD INSECURITY: WITHIN THE PAST 12 MONTHS, YOU WORRIED THAT YOUR FOOD WOULD RUN OUT BEFORE YOU GOT MONEY TO BUY MORE.: NEVER TRUE

## 2025-04-16 ASSESSMENT — ENCOUNTER SYMPTOMS
COUGH: 0
BLOOD IN STOOL: 0
CONSTIPATION: 0
ABDOMINAL PAIN: 0
RHINORRHEA: 0
SHORTNESS OF BREATH: 0
CHEST TIGHTNESS: 0

## 2025-04-16 ASSESSMENT — PATIENT HEALTH QUESTIONNAIRE - PHQ9
SUM OF ALL RESPONSES TO PHQ QUESTIONS 1-9: 0
1. LITTLE INTEREST OR PLEASURE IN DOING THINGS: NOT AT ALL
2. FEELING DOWN, DEPRESSED OR HOPELESS: NOT AT ALL

## 2025-04-16 NOTE — PROGRESS NOTES
Chief Complaint   Patient presents with    6 Month Follow-Up     Patient is here today for a 6 month follow-up for diabetes       \"Have you been to the ER, urgent care clinic since your last visit?  Hospitalized since your last visit?\"    no    “Have you seen or consulted any other health care providers outside of [facility] since your last visit?”      NO      Vitals:    25 1104 25 1111   BP: (!) 135/90 136/62   BP Site: Left Upper Arm Left Upper Arm   Patient Position: Sitting Sitting   BP Cuff Size: Large Adult Large Adult   Pulse: 65    Resp: 16    Temp: 98.3 °F (36.8 °C)    TempSrc: Oral    SpO2: 97%    Weight: 79.3 kg (174 lb 12.8 oz)    Height: 1.524 m (5')         Click Here for Release of Records Request      There were no vitals filed for this visit.    Health Maintenance Due   Topic Date Due    Respiratory Syncytial Virus (RSV) Pregnant or age 60 yrs+ (1 - 1-dose 75+ series) Never done        The patient, Yudith Ashraf, identity was verified by name and .

## 2025-04-16 NOTE — PROGRESS NOTES
Subjective:      Patient ID: Yudith Ashraf is a 84 y.o. female.    HPI  Follow up on chronic medical problems.  Cardiovascular Review:  The patient has coronary artery disease and hyperlipidemia.  Has dilated cardiomyopathy. EF in 11/22 at 50-55%   Pt started on entresto and jardiance.  Cardiologist is Dr. Collins.    Had cardiac catheterization was 12/2019 and was decided to medically treat the proximal RCA and distal second DIAG lesion.  Overall doing well.    Had episode of SSS/CHB in May 2020 and had second PM placement in 2022.     Had cardiac Cath at that time May 2020.  Similar CAD as prior cath 12/2019.   Repeat cardiac Cath 12/2021 d/t increased SOB:  Moderate CAD. no significant change from 5/20.  Mild LV dysfunction.    Diet and Lifestyle: generally follows a low fat low cholesterol diet, generally follows a low sodium diet, exercises sporadically, we discussed increasing her exercise regimen.  Home BP Monitoring: is not measured at home.  Pertinent ROS: taking medications as instructed, no medication side effects noted, no TIA's, no chest pain on exertion, no dyspnea on exertion, no swelling of ankles, no palpitations.   Glucose intolerance reveiw:  She has IGT.  Diabetic ROS - further diabetic ROS: no polyuria or polydipsia, no chest pain, dyspnea or TIA's, no numbness, tingling or pain in extremities, no unusual visual symptoms.   Lab review: orders written for new lab studies as appropriate; see orders.     Hypertriglyceridemia follow up:  Compliant w/ meds, low fat, low cholesterol diet.  She is on crestor.  Triglyceride are still slight elevated.  Discussed adding trico is numbers.Tolerating well.  Exercising some.  No muscle nor abdominal pain, no skin discoloration.      Osteoarthritis:  Patient has osteoarthritis.  Has various joint aches but overall doing well.    Symptoms onset: problem is longstanding.  Rheumatological ROS: stable, mild-to-moderate joint symptoms intermittently,

## 2025-04-17 ENCOUNTER — RESULTS FOLLOW-UP (OUTPATIENT)
Age: 85
End: 2025-04-17

## 2025-07-31 DIAGNOSIS — F41.9 ANXIETY: ICD-10-CM

## 2025-08-01 RX ORDER — LORAZEPAM 1 MG/1
TABLET ORAL
Qty: 30 TABLET | Refills: 0 | Status: SHIPPED | OUTPATIENT
Start: 2025-08-01 | End: 2025-08-31

## 2025-08-01 NOTE — TELEPHONE ENCOUNTER
Forwarded to provider for approval. Last Visit:04/16/2025  Next Visit: 10/15/2025     Last fill:LORazepam (ATIVAN) 1 MG tablet [[id number]]  ENDED    Order Details  Dose: 1 mg Route: Oral Frequency: EVERY 6 HOURS PRN for Anxiety   Dispense Quantity: 90 tablet Refills: 0          Sig: Take 1 tablet by mouth every 6 hours as needed for Anxiety for up to 99 days. Max Daily Amount: 3 mg         Start Date: 01/15/25 End Date: 04/24/25   Written Date: 01/15/25 Expiration Date: 07/14/25       Associated Diagnoses: Anxiety [F41.9]   Original Order: LORazepam (ATIVAN) 1 MG tablet [[id number]]   Providers    Authorizing Provider: Adriana Floyd MD NPI: [id number]   Ordering User: Adriana Floyd MD          Pharmacy    [id number] [place], [place] - [address] [facility] -  [phone] - F [phone]  [address] [place] [id number]  Phone: [phone]  Fax: [phone]

## (undated) DEVICE — GUIDE COR SNUS L40CM DIA9FR 0.035IN STD CRV ADV UNIQUE

## (undated) DEVICE — CATHETER ANGIO JR4 AD 5 FRX100 CM 25 CM PERFORMA

## (undated) DEVICE — GLIDESHEATH SLENDER ACCESS KIT: Brand: GLIDESHEATH SLENDER

## (undated) DEVICE — KIT ACCS INTRO 4FR L10CM NDL 21GA L7CM GWIRE L40CM

## (undated) DEVICE — CATHETER ETER ANGIO L110CM OD5FR ID046IN L75CM 038IN 145DEG CARD

## (undated) DEVICE — 3M™ TEGADERM™ TRANSPARENT FILM DRESSING FRAME STYLE, 1626W, 4 IN X 4-3/4 IN (10 CM X 12 CM), 50/CT 4CT/CASE: Brand: 3M™ TEGADERM™

## (undated) DEVICE — HI-TORQUE VERSACORE FLOPPY GUIDE WIRE SYSTEM 145 CM: Brand: HI-TORQUE VERSACORE

## (undated) DEVICE — GUIDEWIRE VASC L40CM DIA0018IN NDL 21GA L7CM Z S STL MAK

## (undated) DEVICE — CATHETER DIAG 6FR L110CM INTRO 6FR BLLN DIA9MM 1CC PULM ART

## (undated) DEVICE — PACK PROCEDURE SURG HRT CATH

## (undated) DEVICE — REM POLYHESIVE ADULT PATIENT RETURN ELECTRODE: Brand: VALLEYLAB

## (undated) DEVICE — TUBING PRSS MON L6IN PVC M FEM CONN

## (undated) DEVICE — ABSORBABLE WOUND CLOSURE DEVICE: Brand: V-LOC 90

## (undated) DEVICE — SUTURE COAT VCRL SZ 4-0 L18IN ABSRB UD L19MM PS-2 1/2 CIR J496G

## (undated) DEVICE — BAND COMPR L24CM REG CLR PLAS HEMSTAT EXT HK AND LOOP RETEN

## (undated) DEVICE — INTRO SHTH 7FR 13X20CM -- TEARAWAY

## (undated) DEVICE — TRAY,IRRIGATION,PISTON SYRINGE,60ML,STRL: Brand: MEDLINE

## (undated) DEVICE — PCMKR AZURE XT DR MRI --
Type: IMPLANTABLE DEVICE | Status: NON-FUNCTIONAL
Removed: 2022-03-18

## (undated) DEVICE — SPLINT WR POS F/ARTERIAL ACC -- BX/10

## (undated) DEVICE — SYR ART 700 CLEAR MARK 7 -- ARTERION

## (undated) DEVICE — TR BAND RADIAL ARTERY COMPRESSION DEVICE: Brand: TR BAND

## (undated) DEVICE — MEDI-TRACE CADENCE ADULT, DEFIBRILLATION ELECTRODE -RTS  (10 PR/PK) - PHILIPS: Brand: MEDI-TRACE CADENCE

## (undated) DEVICE — AMPLATZ EXTRA STIFF WIRE GUIDE: Brand: AMPLATZ

## (undated) DEVICE — Z DUPLICATE USE 2103554 VALVE HEMOSTATIC BLEEDBK CTRL COPILOT

## (undated) DEVICE — INTRO SHTH 9FR 13X20CM -- USE ITEM# 341577

## (undated) DEVICE — RADIFOCUS GLIDEWIRE: Brand: GLIDEWIRE

## (undated) DEVICE — RADIFOCUS OPTITORQUE ANGIOGRAPHIC CATHETER: Brand: OPTITORQUE

## (undated) DEVICE — SYSTEM INTRO 10.5FR L13CM STD WHT CAP HEMSTAT SPLITTABLE

## (undated) DEVICE — PLASMABLADE PS200-040 4.0: Brand: PLASMABLADE™

## (undated) DEVICE — SUT SLK 0 30IN SH BLK --

## (undated) DEVICE — 3M™ IOBAN™ 2 ANTIMICROBIAL INCISE DRAPE 6650EZ: Brand: IOBAN™ 2

## (undated) DEVICE — GUIDEWIRE VASC L260CM 0.035IN J TIP L3MM PTFE FIX COR NAMIC

## (undated) DEVICE — KIT ANGIOGRAPHY CUST MRMC

## (undated) DEVICE — SYR POWER 150ML 8IN FILL TUBE --

## (undated) DEVICE — ANGIOGRAPHY KIT CUST [K0910930B] [MERIT MEDICAL SYSTEMS INC]

## (undated) DEVICE — ADHESIVE SKIN CLSR 1ML TISS HI VISC EXOFIN

## (undated) DEVICE — AIRLIFE™  ADULT CUSHION NASAL CANNULA WITH 7 FOOT (2.1 M) CRUSH-RESISTANT OXYGEN TUBING, AND U/CONNECT-IT ADAPTER: Brand: AIRLIFE™

## (undated) DEVICE — GDWIRE WHISPER HITORQ EDS CSJ -- ACUITY SOLD BY BX ONLY 4648

## (undated) DEVICE — SUTURE V-LOC 180 SZ 2-0 L12IN ABSRB VLT GS-21 L37MM 1/2 CIR VLOCM0315

## (undated) DEVICE — ZIP 8I SURGICAL SKIN CLOSURE DEVICE: Brand: ZIP 8I SURGICAL SKIN CLOSURE DEVICE

## (undated) DEVICE — SYRINGE ANGIO 10 CC BRL STD PRNT POLYCARB LT BLU MEDALLION

## (undated) DEVICE — CATHETER ANGIO L100CM OD5FR ID.046IN L2.5CM .038IN PROG R

## (undated) DEVICE — SUTURE VCRL SZ 2-0 L36IN ABSRB UD L40MM CT 1/2 CIR J957H

## (undated) DEVICE — PACK PROC PACEMAKER  LF

## (undated) DEVICE — STOPCOCK IV 4 W TRNSPAR

## (undated) DEVICE — BASIC SINGLE BASIN 1-LF: Brand: MEDLINE INDUSTRIES, INC.

## (undated) DEVICE — CATHETER DIAG 5FR L75CM ID0.046IN HK CRV VEIN SEL UNIQUE